# Patient Record
Sex: FEMALE | Race: OTHER | HISPANIC OR LATINO
[De-identification: names, ages, dates, MRNs, and addresses within clinical notes are randomized per-mention and may not be internally consistent; named-entity substitution may affect disease eponyms.]

---

## 2023-06-20 PROBLEM — Z00.00 ENCOUNTER FOR PREVENTIVE HEALTH EXAMINATION: Status: ACTIVE | Noted: 2023-06-20

## 2023-07-02 ENCOUNTER — FORM ENCOUNTER (OUTPATIENT)
Age: 73
End: 2023-07-02

## 2023-07-03 ENCOUNTER — OUTPATIENT (OUTPATIENT)
Dept: OUTPATIENT SERVICES | Facility: HOSPITAL | Age: 73
LOS: 1 days | End: 2023-07-03
Payer: MEDICARE

## 2023-07-03 ENCOUNTER — NON-APPOINTMENT (OUTPATIENT)
Age: 73
End: 2023-07-03

## 2023-07-03 ENCOUNTER — APPOINTMENT (OUTPATIENT)
Dept: CARDIOTHORACIC SURGERY | Facility: CLINIC | Age: 73
End: 2023-07-03
Payer: MEDICARE

## 2023-07-03 VITALS
BODY MASS INDEX: 23.79 KG/M2 | HEIGHT: 61 IN | HEART RATE: 81 BPM | SYSTOLIC BLOOD PRESSURE: 104 MMHG | TEMPERATURE: 97.4 F | DIASTOLIC BLOOD PRESSURE: 71 MMHG | OXYGEN SATURATION: 98 % | RESPIRATION RATE: 17 BRPM | WEIGHT: 126 LBS

## 2023-07-03 DIAGNOSIS — Z86.39 PERSONAL HISTORY OF OTHER ENDOCRINE, NUTRITIONAL AND METABOLIC DISEASE: ICD-10-CM

## 2023-07-03 DIAGNOSIS — I35.0 NONRHEUMATIC AORTIC (VALVE) STENOSIS: ICD-10-CM

## 2023-07-03 DIAGNOSIS — Z87.891 PERSONAL HISTORY OF NICOTINE DEPENDENCE: ICD-10-CM

## 2023-07-03 LAB
ALBUMIN SERPL ELPH-MCNC: 4.2 G/DL — SIGNIFICANT CHANGE UP (ref 3.3–5)
ALP SERPL-CCNC: 58 U/L — SIGNIFICANT CHANGE UP (ref 40–120)
ALT FLD-CCNC: 16 U/L — SIGNIFICANT CHANGE UP (ref 10–45)
ANION GAP SERPL CALC-SCNC: 15 MMOL/L — SIGNIFICANT CHANGE UP (ref 5–17)
ANISOCYTOSIS BLD QL: SIGNIFICANT CHANGE UP
APTT BLD: 38.5 SEC — HIGH (ref 27.5–35.5)
AST SERPL-CCNC: 24 U/L — SIGNIFICANT CHANGE UP (ref 10–40)
BASOPHILS # BLD AUTO: 0.3 K/UL — HIGH (ref 0–0.2)
BASOPHILS NFR BLD AUTO: 2.7 % — HIGH (ref 0–2)
BILIRUB SERPL-MCNC: 0.3 MG/DL — SIGNIFICANT CHANGE UP (ref 0.2–1.2)
BUN SERPL-MCNC: 13 MG/DL — SIGNIFICANT CHANGE UP (ref 7–23)
BURR CELLS BLD QL SMEAR: PRESENT — SIGNIFICANT CHANGE UP
CALCIUM SERPL-MCNC: 9.9 MG/DL — SIGNIFICANT CHANGE UP (ref 8.4–10.5)
CHLORIDE SERPL-SCNC: 104 MMOL/L — SIGNIFICANT CHANGE UP (ref 96–108)
CO2 SERPL-SCNC: 24 MMOL/L — SIGNIFICANT CHANGE UP (ref 22–31)
CREAT SERPL-MCNC: 0.61 MG/DL — SIGNIFICANT CHANGE UP (ref 0.5–1.3)
EGFR: 96 ML/MIN/1.73M2 — SIGNIFICANT CHANGE UP
ELLIPTOCYTES BLD QL SMEAR: SLIGHT — SIGNIFICANT CHANGE UP
EOSINOPHIL # BLD AUTO: 0.1 K/UL — SIGNIFICANT CHANGE UP (ref 0–0.5)
EOSINOPHIL NFR BLD AUTO: 0.9 % — SIGNIFICANT CHANGE UP (ref 0–6)
GIANT PLATELETS BLD QL SMEAR: PRESENT — SIGNIFICANT CHANGE UP
GLUCOSE SERPL-MCNC: 214 MG/DL — HIGH (ref 70–99)
HCT VFR BLD CALC: 41.5 % — SIGNIFICANT CHANGE UP (ref 34.5–45)
HGB BLD-MCNC: 12.2 G/DL — SIGNIFICANT CHANGE UP (ref 11.5–15.5)
HYPOCHROMIA BLD QL: SLIGHT — SIGNIFICANT CHANGE UP
INR BLD: 1.02 — SIGNIFICANT CHANGE UP (ref 0.88–1.16)
LYMPHOCYTES # BLD AUTO: 1.29 K/UL — SIGNIFICANT CHANGE UP (ref 1–3.3)
LYMPHOCYTES # BLD AUTO: 11.5 % — LOW (ref 13–44)
MACROCYTES BLD QL: SLIGHT — SIGNIFICANT CHANGE UP
MANUAL SMEAR VERIFICATION: SIGNIFICANT CHANGE UP
MCHC RBC-ENTMCNC: 24 PG — LOW (ref 27–34)
MCHC RBC-ENTMCNC: 29.4 GM/DL — LOW (ref 32–36)
MCV RBC AUTO: 81.5 FL — SIGNIFICANT CHANGE UP (ref 80–100)
MICROCYTES BLD QL: SLIGHT — SIGNIFICANT CHANGE UP
MONOCYTES # BLD AUTO: 0.39 K/UL — SIGNIFICANT CHANGE UP (ref 0–0.9)
MONOCYTES NFR BLD AUTO: 3.5 % — SIGNIFICANT CHANGE UP (ref 2–14)
MYELOCYTES NFR BLD: 0.9 % — HIGH (ref 0–0)
NEUTROPHILS # BLD AUTO: 9.02 K/UL — HIGH (ref 1.8–7.4)
NEUTROPHILS NFR BLD AUTO: 80.5 % — HIGH (ref 43–77)
NT-PROBNP SERPL-SCNC: 508 PG/ML — HIGH (ref 0–300)
OVALOCYTES BLD QL SMEAR: SLIGHT — SIGNIFICANT CHANGE UP
PLAT MORPH BLD: ABNORMAL
PLATELET # BLD AUTO: 544 K/UL — HIGH (ref 150–400)
POIKILOCYTOSIS BLD QL AUTO: SIGNIFICANT CHANGE UP
POLYCHROMASIA BLD QL SMEAR: SLIGHT — SIGNIFICANT CHANGE UP
POTASSIUM SERPL-MCNC: 5 MMOL/L — SIGNIFICANT CHANGE UP (ref 3.5–5.3)
POTASSIUM SERPL-SCNC: 5 MMOL/L — SIGNIFICANT CHANGE UP (ref 3.5–5.3)
PROT SERPL-MCNC: 6.7 G/DL — SIGNIFICANT CHANGE UP (ref 6–8.3)
PROTHROM AB SERPL-ACNC: 12.1 SEC — SIGNIFICANT CHANGE UP (ref 10.5–13.4)
RBC # BLD: 5.09 M/UL — SIGNIFICANT CHANGE UP (ref 3.8–5.2)
RBC # FLD: SIGNIFICANT CHANGE UP (ref 10.3–14.5)
RBC BLD AUTO: ABNORMAL
SODIUM SERPL-SCNC: 143 MMOL/L — SIGNIFICANT CHANGE UP (ref 135–145)
SPHEROCYTES BLD QL SMEAR: SIGNIFICANT CHANGE UP
WBC # BLD: 11.21 K/UL — HIGH (ref 3.8–10.5)
WBC # FLD AUTO: 11.21 K/UL — HIGH (ref 3.8–10.5)

## 2023-07-03 PROCEDURE — 85730 THROMBOPLASTIN TIME PARTIAL: CPT

## 2023-07-03 PROCEDURE — 93306 TTE W/DOPPLER COMPLETE: CPT

## 2023-07-03 PROCEDURE — 85610 PROTHROMBIN TIME: CPT

## 2023-07-03 PROCEDURE — 85025 COMPLETE CBC W/AUTO DIFF WBC: CPT

## 2023-07-03 PROCEDURE — 83880 ASSAY OF NATRIURETIC PEPTIDE: CPT

## 2023-07-03 PROCEDURE — 80053 COMPREHEN METABOLIC PANEL: CPT

## 2023-07-03 PROCEDURE — 36415 COLL VENOUS BLD VENIPUNCTURE: CPT

## 2023-07-03 PROCEDURE — 86901 BLOOD TYPING SEROLOGIC RH(D): CPT

## 2023-07-03 PROCEDURE — 93306 TTE W/DOPPLER COMPLETE: CPT | Mod: 26

## 2023-07-03 PROCEDURE — 86850 RBC ANTIBODY SCREEN: CPT

## 2023-07-03 PROCEDURE — 86900 BLOOD TYPING SEROLOGIC ABO: CPT

## 2023-07-03 PROCEDURE — 99205 OFFICE O/P NEW HI 60 MIN: CPT

## 2023-07-07 PROBLEM — Z87.891 FORMER SMOKER: Status: ACTIVE | Noted: 2023-07-07

## 2023-07-07 PROBLEM — Z86.39 HISTORY OF DIABETES MELLITUS: Status: RESOLVED | Noted: 2023-07-07 | Resolved: 2023-07-07

## 2023-07-07 NOTE — PHYSICAL EXAM
[Well Developed] : well developed [Well Nourished] : well nourished [No Acute Distress] : no acute distress [Clear Lung Fields] : clear lung fields [Good Air Entry] : good air entry [No Respiratory Distress] : no respiratory distress  [Soft] : abdomen soft [Normal Gait] : normal gait [No Edema] : no edema [No Rash] : no rash [Moves all extremities] : moves all extremities [Alert and Oriented] : alert and oriented [de-identified] : +LORRAINE heard best RUSB

## 2023-07-07 NOTE — REVIEW OF SYSTEMS
[Feeling Fatigued] : feeling fatigued [Negative] : Heme/Lymph [Fever] : no fever [Chills] : no chills [SOB] : no shortness of breath [Dyspnea on exertion] : not dyspnea during exertion [Chest Discomfort] : no chest discomfort [Lower Ext Edema] : no extremity edema [Leg Claudication] : no intermittent leg claudication [Palpitations] : no palpitations [Syncope] : no syncope [Cough] : no cough

## 2023-07-07 NOTE — HISTORY OF PRESENT ILLNESS
[FreeTextEntry1] : 74 y/o Female with PMHx of DMII, recently presented to [facility] 6/12/23 for weakness and cough, found to have anemia 2/2 UGIB and PNA, s/p EGD 6/13/23 friable gastric mucosa s/p 2uPRBCs and PPI ggt, ECHO done revealing severe AS MG 32mmHg. Referred to Dr. Jerry MALAVE. \par \par ECHO 7/3/23: severe AS MG 44mmHg, EF nml\par \par Reports still some weakness and faitgue, but overall feels better since prior admission. Patient denies CP, palpitations, SOB, LE edema, orthopnea, PND, f/c. Pt lives in , has a daughter and son in [place]. Does not use cane or walker.

## 2023-07-14 ENCOUNTER — APPOINTMENT (OUTPATIENT)
Dept: ULTRASOUND IMAGING | Facility: HOSPITAL | Age: 73
End: 2023-07-14

## 2023-07-14 ENCOUNTER — APPOINTMENT (OUTPATIENT)
Dept: CT IMAGING | Facility: HOSPITAL | Age: 73
End: 2023-07-14

## 2023-07-14 ENCOUNTER — OUTPATIENT (OUTPATIENT)
Dept: OUTPATIENT SERVICES | Facility: HOSPITAL | Age: 73
LOS: 1 days | End: 2023-07-14
Payer: MEDICARE

## 2023-07-14 LAB — POCT ISTAT CREATININE: 0.6 MG/DL — SIGNIFICANT CHANGE UP (ref 0.5–1.3)

## 2023-07-14 PROCEDURE — 93880 EXTRACRANIAL BILAT STUDY: CPT

## 2023-07-14 PROCEDURE — 82565 ASSAY OF CREATININE: CPT

## 2023-07-14 PROCEDURE — 74174 CTA ABD&PLVS W/CONTRAST: CPT

## 2023-07-14 PROCEDURE — 93880 EXTRACRANIAL BILAT STUDY: CPT | Mod: 26

## 2023-07-14 PROCEDURE — 74174 CTA ABD&PLVS W/CONTRAST: CPT | Mod: 26

## 2023-07-14 PROCEDURE — 75574 CT ANGIO HRT W/3D IMAGE: CPT | Mod: 26

## 2023-07-14 PROCEDURE — 75574 CT ANGIO HRT W/3D IMAGE: CPT

## 2023-07-31 ENCOUNTER — APPOINTMENT (OUTPATIENT)
Dept: CARDIOTHORACIC SURGERY | Facility: CLINIC | Age: 73
End: 2023-07-31
Payer: MEDICARE

## 2023-07-31 PROCEDURE — 99213 OFFICE O/P EST LOW 20 MIN: CPT

## 2023-11-13 ENCOUNTER — APPOINTMENT (OUTPATIENT)
Dept: CARDIOTHORACIC SURGERY | Facility: CLINIC | Age: 73
End: 2023-11-13
Payer: MEDICARE

## 2023-11-13 VITALS
HEIGHT: 61 IN | BODY MASS INDEX: 24.35 KG/M2 | TEMPERATURE: 97.1 F | RESPIRATION RATE: 17 BRPM | WEIGHT: 129 LBS | OXYGEN SATURATION: 99 % | DIASTOLIC BLOOD PRESSURE: 86 MMHG | SYSTOLIC BLOOD PRESSURE: 126 MMHG | HEART RATE: 84 BPM

## 2023-11-13 PROCEDURE — 99213 OFFICE O/P EST LOW 20 MIN: CPT

## 2024-02-12 ENCOUNTER — APPOINTMENT (OUTPATIENT)
Dept: CARDIOTHORACIC SURGERY | Facility: CLINIC | Age: 74
End: 2024-02-12

## 2024-03-11 ENCOUNTER — RESULT REVIEW (OUTPATIENT)
Age: 74
End: 2024-03-11

## 2024-03-11 ENCOUNTER — APPOINTMENT (OUTPATIENT)
Dept: CARDIOTHORACIC SURGERY | Facility: CLINIC | Age: 74
End: 2024-03-11
Payer: MEDICARE

## 2024-03-11 ENCOUNTER — OUTPATIENT (OUTPATIENT)
Dept: OUTPATIENT SERVICES | Facility: HOSPITAL | Age: 74
LOS: 1 days | End: 2024-03-11
Payer: MEDICARE

## 2024-03-11 DIAGNOSIS — I35.0 NONRHEUMATIC AORTIC (VALVE) STENOSIS: ICD-10-CM

## 2024-03-11 LAB
ALBUMIN SERPL ELPH-MCNC: 4.5 G/DL — SIGNIFICANT CHANGE UP (ref 3.3–5)
ALP SERPL-CCNC: 77 U/L — SIGNIFICANT CHANGE UP (ref 40–120)
ALT FLD-CCNC: 24 U/L — SIGNIFICANT CHANGE UP (ref 10–45)
ANION GAP SERPL CALC-SCNC: 14 MMOL/L — SIGNIFICANT CHANGE UP (ref 5–17)
APTT BLD: 32.5 SEC — SIGNIFICANT CHANGE UP (ref 24.5–35.6)
AST SERPL-CCNC: 31 U/L — SIGNIFICANT CHANGE UP (ref 10–40)
BASOPHILS # BLD AUTO: 0.04 K/UL — SIGNIFICANT CHANGE UP (ref 0–0.2)
BASOPHILS NFR BLD AUTO: 0.5 % — SIGNIFICANT CHANGE UP (ref 0–2)
BILIRUB SERPL-MCNC: 0.5 MG/DL — SIGNIFICANT CHANGE UP (ref 0.2–1.2)
BUN SERPL-MCNC: 10 MG/DL — SIGNIFICANT CHANGE UP (ref 7–23)
CALCIUM SERPL-MCNC: 10.1 MG/DL — SIGNIFICANT CHANGE UP (ref 8.4–10.5)
CHLORIDE SERPL-SCNC: 98 MMOL/L — SIGNIFICANT CHANGE UP (ref 96–108)
CO2 SERPL-SCNC: 24 MMOL/L — SIGNIFICANT CHANGE UP (ref 22–31)
CREAT SERPL-MCNC: 0.63 MG/DL — SIGNIFICANT CHANGE UP (ref 0.5–1.3)
EGFR: 95 ML/MIN/1.73M2 — SIGNIFICANT CHANGE UP
EOSINOPHIL # BLD AUTO: 0.04 K/UL — SIGNIFICANT CHANGE UP (ref 0–0.5)
EOSINOPHIL NFR BLD AUTO: 0.5 % — SIGNIFICANT CHANGE UP (ref 0–6)
GLUCOSE SERPL-MCNC: 374 MG/DL — HIGH (ref 70–99)
HCT VFR BLD CALC: 35.8 % — SIGNIFICANT CHANGE UP (ref 34.5–45)
HGB BLD-MCNC: 11 G/DL — LOW (ref 11.5–15.5)
IMM GRANULOCYTES NFR BLD AUTO: 0.4 % — SIGNIFICANT CHANGE UP (ref 0–0.9)
INR BLD: 1.05 — SIGNIFICANT CHANGE UP (ref 0.85–1.18)
LYMPHOCYTES # BLD AUTO: 0.54 K/UL — LOW (ref 1–3.3)
LYMPHOCYTES # BLD AUTO: 6.6 % — LOW (ref 13–44)
MCHC RBC-ENTMCNC: 25.2 PG — LOW (ref 27–34)
MCHC RBC-ENTMCNC: 30.7 GM/DL — LOW (ref 32–36)
MCV RBC AUTO: 81.9 FL — SIGNIFICANT CHANGE UP (ref 80–100)
MONOCYTES # BLD AUTO: 0.64 K/UL — SIGNIFICANT CHANGE UP (ref 0–0.9)
MONOCYTES NFR BLD AUTO: 7.8 % — SIGNIFICANT CHANGE UP (ref 2–14)
NEUTROPHILS # BLD AUTO: 6.89 K/UL — SIGNIFICANT CHANGE UP (ref 1.8–7.4)
NEUTROPHILS NFR BLD AUTO: 84.2 % — HIGH (ref 43–77)
NRBC # BLD: 0 /100 WBCS — SIGNIFICANT CHANGE UP (ref 0–0)
NT-PROBNP SERPL-SCNC: 2246 PG/ML — HIGH (ref 0–300)
PLATELET # BLD AUTO: 383 K/UL — SIGNIFICANT CHANGE UP (ref 150–400)
POTASSIUM SERPL-MCNC: 4 MMOL/L — SIGNIFICANT CHANGE UP (ref 3.5–5.3)
POTASSIUM SERPL-SCNC: 4 MMOL/L — SIGNIFICANT CHANGE UP (ref 3.5–5.3)
PROT SERPL-MCNC: 7.3 G/DL — SIGNIFICANT CHANGE UP (ref 6–8.3)
PROTHROM AB SERPL-ACNC: 11.9 SEC — SIGNIFICANT CHANGE UP (ref 9.5–13)
RBC # BLD: 4.37 M/UL — SIGNIFICANT CHANGE UP (ref 3.8–5.2)
RBC # FLD: 13.7 % — SIGNIFICANT CHANGE UP (ref 10.3–14.5)
SODIUM SERPL-SCNC: 136 MMOL/L — SIGNIFICANT CHANGE UP (ref 135–145)
WBC # BLD: 8.18 K/UL — SIGNIFICANT CHANGE UP (ref 3.8–10.5)
WBC # FLD AUTO: 8.18 K/UL — SIGNIFICANT CHANGE UP (ref 3.8–10.5)

## 2024-03-11 PROCEDURE — 86900 BLOOD TYPING SEROLOGIC ABO: CPT

## 2024-03-11 PROCEDURE — 83880 ASSAY OF NATRIURETIC PEPTIDE: CPT

## 2024-03-11 PROCEDURE — 85610 PROTHROMBIN TIME: CPT

## 2024-03-11 PROCEDURE — 85025 COMPLETE CBC W/AUTO DIFF WBC: CPT

## 2024-03-11 PROCEDURE — 99214 OFFICE O/P EST MOD 30 MIN: CPT

## 2024-03-11 PROCEDURE — 85730 THROMBOPLASTIN TIME PARTIAL: CPT

## 2024-03-11 PROCEDURE — 36415 COLL VENOUS BLD VENIPUNCTURE: CPT

## 2024-03-11 PROCEDURE — 93306 TTE W/DOPPLER COMPLETE: CPT | Mod: 26

## 2024-03-11 PROCEDURE — 86850 RBC ANTIBODY SCREEN: CPT

## 2024-03-11 PROCEDURE — 93010 ELECTROCARDIOGRAM REPORT: CPT

## 2024-03-11 PROCEDURE — 86901 BLOOD TYPING SEROLOGIC RH(D): CPT

## 2024-03-11 PROCEDURE — C8929: CPT

## 2024-03-11 PROCEDURE — 80053 COMPREHEN METABOLIC PANEL: CPT

## 2024-03-11 PROCEDURE — 93005 ELECTROCARDIOGRAM TRACING: CPT

## 2024-03-12 ENCOUNTER — INPATIENT (INPATIENT)
Facility: HOSPITAL | Age: 74
LOS: 18 days | Discharge: HOME HEALTH | End: 2024-03-31
Attending: STUDENT IN AN ORGANIZED HEALTH CARE EDUCATION/TRAINING PROGRAM | Admitting: STUDENT IN AN ORGANIZED HEALTH CARE EDUCATION/TRAINING PROGRAM
Payer: MEDICARE

## 2024-03-12 VITALS
WEIGHT: 123.24 LBS | SYSTOLIC BLOOD PRESSURE: 118 MMHG | OXYGEN SATURATION: 96 % | HEART RATE: 106 BPM | RESPIRATION RATE: 19 BRPM | DIASTOLIC BLOOD PRESSURE: 58 MMHG

## 2024-03-12 VITALS
DIASTOLIC BLOOD PRESSURE: 58 MMHG | SYSTOLIC BLOOD PRESSURE: 120 MMHG | TEMPERATURE: 97.9 F | RESPIRATION RATE: 18 BRPM | HEART RATE: 91 BPM | OXYGEN SATURATION: 99 %

## 2024-03-12 DIAGNOSIS — I35.0 NONRHEUMATIC AORTIC (VALVE) STENOSIS: ICD-10-CM

## 2024-03-12 DIAGNOSIS — Z87.891 PERSONAL HISTORY OF NICOTINE DEPENDENCE: ICD-10-CM

## 2024-03-12 DIAGNOSIS — Z79.4 LONG TERM (CURRENT) USE OF INSULIN: ICD-10-CM

## 2024-03-12 DIAGNOSIS — R09.02 HYPOXEMIA: ICD-10-CM

## 2024-03-12 DIAGNOSIS — J18.9 PNEUMONIA, UNSPECIFIED ORGANISM: ICD-10-CM

## 2024-03-12 DIAGNOSIS — I95.9 HYPOTENSION, UNSPECIFIED: ICD-10-CM

## 2024-03-12 DIAGNOSIS — I11.0 HYPERTENSIVE HEART DISEASE WITH HEART FAILURE: ICD-10-CM

## 2024-03-12 DIAGNOSIS — R00.1 BRADYCARDIA, UNSPECIFIED: ICD-10-CM

## 2024-03-12 DIAGNOSIS — I25.84 CORONARY ATHEROSCLEROSIS DUE TO CALCIFIED CORONARY LESION: ICD-10-CM

## 2024-03-12 DIAGNOSIS — J40 BRONCHITIS, NOT SPECIFIED AS ACUTE OR CHRONIC: ICD-10-CM
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
## 2024-03-12 DIAGNOSIS — E87.20 ACIDOSIS, UNSPECIFIED: ICD-10-CM

## 2024-03-12 DIAGNOSIS — R57.0 CARDIOGENIC SHOCK: ICD-10-CM

## 2024-03-12 DIAGNOSIS — E78.00 PURE HYPERCHOLESTEROLEMIA, UNSPECIFIED: ICD-10-CM

## 2024-03-12 DIAGNOSIS — Z83.3 FAMILY HISTORY OF DIABETES MELLITUS: ICD-10-CM

## 2024-03-12 DIAGNOSIS — Z79.82 LONG TERM (CURRENT) USE OF ASPIRIN: ICD-10-CM

## 2024-03-12 DIAGNOSIS — Z79.84 LONG TERM (CURRENT) USE OF ORAL HYPOGLYCEMIC DRUGS: ICD-10-CM

## 2024-03-12 DIAGNOSIS — I50.43 ACUTE ON CHRONIC COMBINED SYSTOLIC (CONGESTIVE) AND DIASTOLIC (CONGESTIVE) HEART FAILURE: ICD-10-CM
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
## 2024-03-12 DIAGNOSIS — E11.65 TYPE 2 DIABETES MELLITUS WITH HYPERGLYCEMIA: ICD-10-CM

## 2024-03-12 DIAGNOSIS — Q27.8 OTHER SPECIFIED CONGENITAL MALFORMATIONS OF PERIPHERAL VASCULAR SYSTEM: ICD-10-CM

## 2024-03-12 DIAGNOSIS — I25.10 ATHEROSCLEROTIC HEART DISEASE OF NATIVE CORONARY ARTERY WITHOUT ANGINA PECTORIS: ICD-10-CM

## 2024-03-12 DIAGNOSIS — J95.1 ACUTE PULMONARY INSUFFICIENCY FOLLOWING THORACIC SURGERY: ICD-10-CM

## 2024-03-12 DIAGNOSIS — D62 ACUTE POSTHEMORRHAGIC ANEMIA: ICD-10-CM

## 2024-03-12 DIAGNOSIS — I48.0 PAROXYSMAL ATRIAL FIBRILLATION: ICD-10-CM

## 2024-03-12 DIAGNOSIS — J90 PLEURAL EFFUSION, NOT ELSEWHERE CLASSIFIED: ICD-10-CM

## 2024-03-12 LAB
ADD ON TEST-SPECIMEN IN LAB: SIGNIFICANT CHANGE UP
ALBUMIN SERPL ELPH-MCNC: 3.9 G/DL — SIGNIFICANT CHANGE UP (ref 3.3–5)
ALP SERPL-CCNC: 63 U/L — SIGNIFICANT CHANGE UP (ref 40–120)
ALT FLD-CCNC: 21 U/L — SIGNIFICANT CHANGE UP (ref 10–45)
ANION GAP SERPL CALC-SCNC: 14 MMOL/L — SIGNIFICANT CHANGE UP (ref 5–17)
APTT BLD: 33 SEC — SIGNIFICANT CHANGE UP (ref 24.5–35.6)
AST SERPL-CCNC: 23 U/L — SIGNIFICANT CHANGE UP (ref 10–40)
BILIRUB SERPL-MCNC: 0.4 MG/DL — SIGNIFICANT CHANGE UP (ref 0.2–1.2)
BLD GP AB SCN SERPL QL: NEGATIVE — SIGNIFICANT CHANGE UP
BUN SERPL-MCNC: 12 MG/DL — SIGNIFICANT CHANGE UP (ref 7–23)
CALCIUM SERPL-MCNC: 9.7 MG/DL — SIGNIFICANT CHANGE UP (ref 8.4–10.5)
CHLORIDE SERPL-SCNC: 104 MMOL/L — SIGNIFICANT CHANGE UP (ref 96–108)
CO2 SERPL-SCNC: 23 MMOL/L — SIGNIFICANT CHANGE UP (ref 22–31)
CREAT SERPL-MCNC: 0.67 MG/DL — SIGNIFICANT CHANGE UP (ref 0.5–1.3)
EGFR: 93 ML/MIN/1.73M2 — SIGNIFICANT CHANGE UP
GLUCOSE BLDC GLUCOMTR-MCNC: 215 MG/DL — HIGH (ref 70–99)
GLUCOSE SERPL-MCNC: 194 MG/DL — HIGH (ref 70–99)
HCT VFR BLD CALC: 35.1 % — SIGNIFICANT CHANGE UP (ref 34.5–45)
HGB BLD-MCNC: 11 G/DL — LOW (ref 11.5–15.5)
INR BLD: 1.05 — SIGNIFICANT CHANGE UP (ref 0.85–1.18)
MAGNESIUM SERPL-MCNC: 2 MG/DL — SIGNIFICANT CHANGE UP (ref 1.6–2.6)
MCHC RBC-ENTMCNC: 25.1 PG — LOW (ref 27–34)
MCHC RBC-ENTMCNC: 31.3 GM/DL — LOW (ref 32–36)
MCV RBC AUTO: 80 FL — SIGNIFICANT CHANGE UP (ref 80–100)
NRBC # BLD: 0 /100 WBCS — SIGNIFICANT CHANGE UP (ref 0–0)
NT-PROBNP SERPL-SCNC: 2261 PG/ML — HIGH (ref 0–300)
PLATELET # BLD AUTO: 344 K/UL — SIGNIFICANT CHANGE UP (ref 150–400)
POTASSIUM SERPL-MCNC: 3.6 MMOL/L — SIGNIFICANT CHANGE UP (ref 3.5–5.3)
POTASSIUM SERPL-SCNC: 3.6 MMOL/L — SIGNIFICANT CHANGE UP (ref 3.5–5.3)
PROT SERPL-MCNC: 6.7 G/DL — SIGNIFICANT CHANGE UP (ref 6–8.3)
PROTHROM AB SERPL-ACNC: 12 SEC — SIGNIFICANT CHANGE UP (ref 9.5–13)
RBC # BLD: 4.39 M/UL — SIGNIFICANT CHANGE UP (ref 3.8–5.2)
RBC # FLD: 14.1 % — SIGNIFICANT CHANGE UP (ref 10.3–14.5)
RH IG SCN BLD-IMP: POSITIVE — SIGNIFICANT CHANGE UP
SODIUM SERPL-SCNC: 141 MMOL/L — SIGNIFICANT CHANGE UP (ref 135–145)
TSH SERPL-MCNC: 1.53 UIU/ML — SIGNIFICANT CHANGE UP (ref 0.27–4.2)
WBC # BLD: 6.4 K/UL — SIGNIFICANT CHANGE UP (ref 3.8–10.5)
WBC # FLD AUTO: 6.4 K/UL — SIGNIFICANT CHANGE UP (ref 3.8–10.5)

## 2024-03-12 PROCEDURE — 71045 X-RAY EXAM CHEST 1 VIEW: CPT | Mod: 26

## 2024-03-12 RX ORDER — INSULIN LISPRO 100/ML
VIAL (ML) SUBCUTANEOUS
Refills: 0 | Status: DISCONTINUED | OUTPATIENT
Start: 2024-03-12 | End: 2024-03-17

## 2024-03-12 RX ORDER — DEXTROSE 50 % IN WATER 50 %
12.5 SYRINGE (ML) INTRAVENOUS ONCE
Refills: 0 | Status: DISCONTINUED | OUTPATIENT
Start: 2024-03-12 | End: 2024-03-17

## 2024-03-12 RX ORDER — DEXTROSE 50 % IN WATER 50 %
15 SYRINGE (ML) INTRAVENOUS ONCE
Refills: 0 | Status: DISCONTINUED | OUTPATIENT
Start: 2024-03-12 | End: 2024-03-17

## 2024-03-12 RX ORDER — PANTOPRAZOLE SODIUM 20 MG/1
40 TABLET, DELAYED RELEASE ORAL
Refills: 0 | Status: DISCONTINUED | OUTPATIENT
Start: 2024-03-12 | End: 2024-03-19

## 2024-03-12 RX ORDER — SODIUM CHLORIDE 9 MG/ML
1000 INJECTION, SOLUTION INTRAVENOUS
Refills: 0 | Status: DISCONTINUED | OUTPATIENT
Start: 2024-03-12 | End: 2024-03-17

## 2024-03-12 RX ORDER — TIMOLOL 0.5 %
1 DROPS OPHTHALMIC (EYE)
Refills: 0 | Status: DISCONTINUED | OUTPATIENT
Start: 2024-03-12 | End: 2024-03-19

## 2024-03-12 RX ORDER — LISINOPRIL 2.5 MG/1
10 TABLET ORAL DAILY
Refills: 0 | Status: DISCONTINUED | OUTPATIENT
Start: 2024-03-12 | End: 2024-03-13

## 2024-03-12 RX ORDER — SODIUM CHLORIDE 9 MG/ML
1000 INJECTION, SOLUTION INTRAVENOUS
Refills: 0 | Status: DISCONTINUED | OUTPATIENT
Start: 2024-03-12 | End: 2024-03-16

## 2024-03-12 RX ORDER — AMLODIPINE BESYLATE 2.5 MG/1
5 TABLET ORAL DAILY
Refills: 0 | Status: DISCONTINUED | OUTPATIENT
Start: 2024-03-12 | End: 2024-03-13

## 2024-03-12 RX ORDER — GLUCAGON INJECTION, SOLUTION 0.5 MG/.1ML
1 INJECTION, SOLUTION SUBCUTANEOUS ONCE
Refills: 0 | Status: DISCONTINUED | OUTPATIENT
Start: 2024-03-12 | End: 2024-03-17

## 2024-03-12 RX ORDER — LATANOPROST 0.05 MG/ML
1 SOLUTION/ DROPS OPHTHALMIC; TOPICAL AT BEDTIME
Refills: 0 | Status: DISCONTINUED | OUTPATIENT
Start: 2024-03-12 | End: 2024-03-19

## 2024-03-12 RX ORDER — DEXTROSE 50 % IN WATER 50 %
25 SYRINGE (ML) INTRAVENOUS ONCE
Refills: 0 | Status: DISCONTINUED | OUTPATIENT
Start: 2024-03-12 | End: 2024-03-17

## 2024-03-12 RX ORDER — SODIUM CHLORIDE 9 MG/ML
3 INJECTION INTRAMUSCULAR; INTRAVENOUS; SUBCUTANEOUS EVERY 8 HOURS
Refills: 0 | Status: DISCONTINUED | OUTPATIENT
Start: 2024-03-12 | End: 2024-03-19

## 2024-03-12 RX ORDER — ATORVASTATIN CALCIUM 80 MG/1
40 TABLET, FILM COATED ORAL AT BEDTIME
Refills: 0 | Status: DISCONTINUED | OUTPATIENT
Start: 2024-03-12 | End: 2024-03-19

## 2024-03-12 RX ADMIN — SODIUM CHLORIDE 3 MILLILITER(S): 9 INJECTION INTRAMUSCULAR; INTRAVENOUS; SUBCUTANEOUS at 22:19

## 2024-03-12 RX ADMIN — SODIUM CHLORIDE 50 MILLILITER(S): 9 INJECTION, SOLUTION INTRAVENOUS at 22:26

## 2024-03-12 RX ADMIN — ATORVASTATIN CALCIUM 40 MILLIGRAM(S): 80 TABLET, FILM COATED ORAL at 22:25

## 2024-03-12 NOTE — PATIENT PROFILE ADULT - FALL HARM RISK - RISK INTERVENTIONS

## 2024-03-13 DIAGNOSIS — E11.9 TYPE 2 DIABETES MELLITUS WITHOUT COMPLICATIONS: ICD-10-CM

## 2024-03-13 DIAGNOSIS — I35.0 NONRHEUMATIC AORTIC (VALVE) STENOSIS: ICD-10-CM

## 2024-03-13 DIAGNOSIS — I10 ESSENTIAL (PRIMARY) HYPERTENSION: ICD-10-CM

## 2024-03-13 DIAGNOSIS — E78.5 HYPERLIPIDEMIA, UNSPECIFIED: ICD-10-CM

## 2024-03-13 LAB
A1C WITH ESTIMATED AVERAGE GLUCOSE RESULT: 9.6 % — HIGH (ref 4–5.6)
ESTIMATED AVERAGE GLUCOSE: 229 MG/DL — HIGH (ref 68–114)
GLUCOSE BLDC GLUCOMTR-MCNC: 202 MG/DL — HIGH (ref 70–99)
GLUCOSE BLDC GLUCOMTR-MCNC: 206 MG/DL — HIGH (ref 70–99)
GLUCOSE BLDC GLUCOMTR-MCNC: 285 MG/DL — HIGH (ref 70–99)
GLUCOSE BLDC GLUCOMTR-MCNC: 303 MG/DL — HIGH (ref 70–99)

## 2024-03-13 PROCEDURE — 99223 1ST HOSP IP/OBS HIGH 75: CPT

## 2024-03-13 PROCEDURE — 93880 EXTRACRANIAL BILAT STUDY: CPT | Mod: 26

## 2024-03-13 PROCEDURE — 93010 ELECTROCARDIOGRAM REPORT: CPT

## 2024-03-13 PROCEDURE — 99221 1ST HOSP IP/OBS SF/LOW 40: CPT

## 2024-03-13 RX ORDER — SENNA PLUS 8.6 MG/1
2 TABLET ORAL AT BEDTIME
Refills: 0 | Status: DISCONTINUED | OUTPATIENT
Start: 2024-03-13 | End: 2024-03-19

## 2024-03-13 RX ORDER — INSULIN LISPRO 100/ML
4 VIAL (ML) SUBCUTANEOUS
Refills: 0 | Status: DISCONTINUED | OUTPATIENT
Start: 2024-03-13 | End: 2024-03-15

## 2024-03-13 RX ORDER — INSULIN LISPRO 100/ML
VIAL (ML) SUBCUTANEOUS AT BEDTIME
Refills: 0 | Status: DISCONTINUED | OUTPATIENT
Start: 2024-03-13 | End: 2024-03-17

## 2024-03-13 RX ORDER — METOPROLOL TARTRATE 50 MG
25 TABLET ORAL EVERY 12 HOURS
Refills: 0 | Status: DISCONTINUED | OUTPATIENT
Start: 2024-03-13 | End: 2024-03-19

## 2024-03-13 RX ORDER — DILTIAZEM HCL 120 MG
5 CAPSULE, EXT RELEASE 24 HR ORAL ONCE
Refills: 0 | Status: COMPLETED | OUTPATIENT
Start: 2024-03-13 | End: 2024-03-13

## 2024-03-13 RX ORDER — INSULIN GLARGINE 100 [IU]/ML
14 INJECTION, SOLUTION SUBCUTANEOUS AT BEDTIME
Refills: 0 | Status: DISCONTINUED | OUTPATIENT
Start: 2024-03-13 | End: 2024-03-15

## 2024-03-13 RX ORDER — AMIODARONE HYDROCHLORIDE 400 MG/1
150 TABLET ORAL ONCE
Refills: 0 | Status: COMPLETED | OUTPATIENT
Start: 2024-03-13 | End: 2024-03-13

## 2024-03-13 RX ORDER — ASPIRIN/CALCIUM CARB/MAGNESIUM 324 MG
81 TABLET ORAL DAILY
Refills: 0 | Status: DISCONTINUED | OUTPATIENT
Start: 2024-03-13 | End: 2024-03-19

## 2024-03-13 RX ORDER — ACETAMINOPHEN 500 MG
650 TABLET ORAL EVERY 6 HOURS
Refills: 0 | Status: DISCONTINUED | OUTPATIENT
Start: 2024-03-13 | End: 2024-03-19

## 2024-03-13 RX ORDER — POLYETHYLENE GLYCOL 3350 17 G/17G
17 POWDER, FOR SOLUTION ORAL DAILY
Refills: 0 | Status: DISCONTINUED | OUTPATIENT
Start: 2024-03-13 | End: 2024-03-19

## 2024-03-13 RX ORDER — TRAVOPROST 0.04 MG/ML
1 SOLUTION/ DROPS OPHTHALMIC
Refills: 0 | DISCHARGE

## 2024-03-13 RX ORDER — TIMOLOL 0.5 %
1 DROPS OPHTHALMIC (EYE)
Refills: 0 | DISCHARGE

## 2024-03-13 RX ORDER — ACETAMINOPHEN 500 MG
650 TABLET ORAL EVERY 6 HOURS
Refills: 0 | Status: DISCONTINUED | OUTPATIENT
Start: 2024-03-13 | End: 2024-03-13

## 2024-03-13 RX ADMIN — PANTOPRAZOLE SODIUM 40 MILLIGRAM(S): 20 TABLET, DELAYED RELEASE ORAL at 05:24

## 2024-03-13 RX ADMIN — Medication 650 MILLIGRAM(S): at 06:00

## 2024-03-13 RX ADMIN — LATANOPROST 1 DROP(S): 0.05 SOLUTION/ DROPS OPHTHALMIC; TOPICAL at 22:07

## 2024-03-13 RX ADMIN — Medication 25 MILLIGRAM(S): at 17:43

## 2024-03-13 RX ADMIN — AMIODARONE HYDROCHLORIDE 600 MILLIGRAM(S): 400 TABLET ORAL at 17:14

## 2024-03-13 RX ADMIN — Medication 5 MILLIGRAM(S): at 17:25

## 2024-03-13 RX ADMIN — Medication 4: at 17:30

## 2024-03-13 RX ADMIN — Medication 4: at 12:19

## 2024-03-13 RX ADMIN — Medication 650 MILLIGRAM(S): at 05:30

## 2024-03-13 RX ADMIN — SODIUM CHLORIDE 3 MILLILITER(S): 9 INJECTION INTRAMUSCULAR; INTRAVENOUS; SUBCUTANEOUS at 21:03

## 2024-03-13 RX ADMIN — AMIODARONE HYDROCHLORIDE 133.33 MILLIGRAM(S): 400 TABLET ORAL at 17:45

## 2024-03-13 RX ADMIN — Medication 25 MILLIGRAM(S): at 05:25

## 2024-03-13 RX ADMIN — Medication 4 UNIT(S): at 17:31

## 2024-03-13 RX ADMIN — Medication 6: at 07:38

## 2024-03-13 RX ADMIN — Medication 4: at 22:10

## 2024-03-13 RX ADMIN — INSULIN GLARGINE 14 UNIT(S): 100 INJECTION, SOLUTION SUBCUTANEOUS at 22:07

## 2024-03-13 RX ADMIN — LISINOPRIL 10 MILLIGRAM(S): 2.5 TABLET ORAL at 05:25

## 2024-03-13 RX ADMIN — SODIUM CHLORIDE 3 MILLILITER(S): 9 INJECTION INTRAMUSCULAR; INTRAVENOUS; SUBCUTANEOUS at 05:00

## 2024-03-13 RX ADMIN — Medication 5 MILLIGRAM(S): at 17:20

## 2024-03-13 RX ADMIN — SODIUM CHLORIDE 3 MILLILITER(S): 9 INJECTION INTRAMUSCULAR; INTRAVENOUS; SUBCUTANEOUS at 14:30

## 2024-03-13 RX ADMIN — AMLODIPINE BESYLATE 5 MILLIGRAM(S): 2.5 TABLET ORAL at 05:25

## 2024-03-13 RX ADMIN — ATORVASTATIN CALCIUM 40 MILLIGRAM(S): 80 TABLET, FILM COATED ORAL at 22:07

## 2024-03-13 NOTE — H&P ADULT - ASSESSMENT
74 y/o female PMH DM, severe AS, EF 60%, HTN, HLD, anemia who was admitted to [facility] with syncope. CT negative for CVA. subsequent echo showed severe AS. Transferred to [facility] under Dr. Moon for evaluation for TAVR vs BAV.

## 2024-03-13 NOTE — H&P ADULT - NSICDXPASTMEDICALHX_GEN_ALL_CORE_FT
PAST MEDICAL HISTORY:  Anemia     Aortic stenosis     DM (diabetes mellitus)     HTN (hypertension)     Hyperlipidemia

## 2024-03-13 NOTE — PROGRESS NOTE ADULT - SUBJECTIVE AND OBJECTIVE BOX
Patient discussed on morning rounds with Dr. Doyle      Pre-op for SAVR    SUBJECTIVE ASSESSMENT:  73y Female complaining of being hungry, no other active issues. Denies any fevers, chills, headache, lightheadedness, dizziness, chest pain, shortness of breath, abdominal pain, nausea, vomiting, changes in bowel or bladder, paresthesias, or any other acute complaint.        Vital Signs Last 24 Hrs  T(C): 36.3 (13 Mar 2024 13:56), Max: 36.9 (12 Mar 2024 21:20)  T(F): 97.4 (13 Mar 2024 13:56), Max: 98.5 (12 Mar 2024 21:20)  HR: 86 (13 Mar 2024 14:56) (76 - 106)  BP: 95/53 (13 Mar 2024 14:56) (95/53 - 144/65)  BP(mean): 70 (13 Mar 2024 14:56) (68 - 94)  RR: 12 (13 Mar 2024 14:56) (12 - 19)  SpO2: 95% (13 Mar 2024 14:56) (94% - 97%)    Parameters below as of 13 Mar 2024 14:56  Patient On (Oxygen Delivery Method): room air      I&O's Detail    12 Mar 2024 07:01  -  13 Mar 2024 07:00  --------------------------------------------------------  IN:    Lactated Ringers: 450 mL  Total IN: 450 mL    OUT:    Voided (mL): 150 mL  Total OUT: 150 mL    Total NET: 300 mL      13 Mar 2024 07:01  -  13 Mar 2024 15:06  --------------------------------------------------------  IN:    Lactated Ringers: 50 mL    Oral Fluid: 240 mL  Total IN: 290 mL    OUT:  Total OUT: 0 mL    Total NET: 290 mL          CHEST TUBE: NO   MOHSEN DRAIN: No.  EPICARDIAL WIRES: No.  TIE DOWNS: No.  TRENT: No.    PHYSICAL EXAM:    General: Sitting in bed comfortably in NAD  Neuro: A&Ox3, no focal deficits   HEENT: NCAT, EOMI   Cardiac: Regular rate and rhythm, normal S1 and S2. + murmur    Pulm: Breathing comfortably on RA. No signs of respiratory distress. Lungs are CTA b/l without wheezes, rales, or rhonchi   Abdomen: Soft, non-distended, non-tender. + bowel sounds   : No trent  Extremities: Warm and well perfused, no peripheral edema, distal pulses 2+. No calf tenderness. SCDs and TEDs in place  MSK: Full AROM       LABS:                        11.0   6.40  )-----------( 344      ( 12 Mar 2024 22:12 )             35.1     PT/INR - ( 12 Mar 2024 22:12 )   PT: 12.0 sec;   INR: 1.05          PTT - ( 12 Mar 2024 22:12 )  PTT:33.0 sec    03-12    141  |  104  |  12  ----------------------------<  194<H>  3.6   |  23  |  0.67    Ca    9.7      12 Mar 2024 22:12  Mg     2.0     03-12    TPro  6.7  /  Alb  3.9  /  TBili  0.4  /  DBili  x   /  AST  23  /  ALT  21  /  AlkPhos  63  03-12      Urinalysis Basic - ( 12 Mar 2024 22:12 )    Color: x / Appearance: x / SG: x / pH: x  Gluc: 194 mg/dL / Ketone: x  / Bili: x / Urobili: x   Blood: x / Protein: x / Nitrite: x   Leuk Esterase: x / RBC: x / WBC x   Sq Epi: x / Non Sq Epi: x / Bacteria: x        MEDICATIONS  (STANDING):  atorvastatin 40 milliGRAM(s) Oral at bedtime  dextrose 5%. 1000 milliLiter(s) (100 mL/Hr) IV Continuous <Continuous>  dextrose 5%. 1000 milliLiter(s) (50 mL/Hr) IV Continuous <Continuous>  dextrose 50% Injectable 25 Gram(s) IV Push once  dextrose 50% Injectable 25 Gram(s) IV Push once  dextrose 50% Injectable 12.5 Gram(s) IV Push once  glucagon  Injectable 1 milliGRAM(s) IntraMuscular once  insulin glargine Injectable (LANTUS) 14 Unit(s) SubCutaneous at bedtime  insulin lispro (ADMELOG) corrective regimen sliding scale   SubCutaneous three times a day before meals  insulin lispro Injectable (ADMELOG) 4 Unit(s) SubCutaneous three times a day before meals  lactated ringers. 1000 milliLiter(s) (50 mL/Hr) IV Continuous <Continuous>  latanoprost 0.005% Ophthalmic Solution 1 Drop(s) Both EYES at bedtime  metoprolol tartrate 25 milliGRAM(s) Oral every 12 hours  pantoprazole    Tablet 40 milliGRAM(s) Oral before breakfast  sodium chloride 0.9% lock flush 3 milliLiter(s) IV Push every 8 hours  timolol 0.5% Solution 1 Drop(s) Both EYES two times a day    MEDICATIONS  (PRN):  acetaminophen     Tablet .. 650 milliGRAM(s) Oral every 6 hours PRN Mild Pain (1 - 3)  dextrose Oral Gel 15 Gram(s) Oral once PRN Blood Glucose LESS THAN 70 milliGRAM(s)/deciliter        RADIOLOGY & ADDITIONAL TESTS:     Patient discussed on morning rounds with Dr. Doyle      Pre-op for SAVR    SUBJECTIVE ASSESSMENT:  73y Female complaining of being hungry, no other active issues. Denies any fevers, chills, headache, lightheadedness, dizziness, chest pain, shortness of breath, abdominal pain, nausea, vomiting, changes in bowel or bladder, paresthesias, or any other acute complaint.      Vital Signs Last 24 Hrs  T(C): 36.3 (13 Mar 2024 13:56), Max: 36.9 (12 Mar 2024 21:20)  T(F): 97.4 (13 Mar 2024 13:56), Max: 98.5 (12 Mar 2024 21:20)  HR: 86 (13 Mar 2024 14:56) (76 - 106)  BP: 95/53 (13 Mar 2024 14:56) (95/53 - 144/65)  BP(mean): 70 (13 Mar 2024 14:56) (68 - 94)  RR: 12 (13 Mar 2024 14:56) (12 - 19)  SpO2: 95% (13 Mar 2024 14:56) (94% - 97%)    Parameters below as of 13 Mar 2024 14:56  Patient On (Oxygen Delivery Method): room air      I&O's Detail    12 Mar 2024 07:01  -  13 Mar 2024 07:00  --------------------------------------------------------  IN:    Lactated Ringers: 450 mL  Total IN: 450 mL    OUT:    Voided (mL): 150 mL  Total OUT: 150 mL    Total NET: 300 mL      13 Mar 2024 07:01  -  13 Mar 2024 15:06  --------------------------------------------------------  IN:    Lactated Ringers: 50 mL    Oral Fluid: 240 mL  Total IN: 290 mL    OUT:  Total OUT: 0 mL    Total NET: 290 mL          CHEST TUBE: NO   MOHSEN DRAIN: No.  EPICARDIAL WIRES: No.  TIE DOWNS: No.  TRENT: No.    PHYSICAL EXAM:    General: Sitting in bed comfortably in NAD  Neuro: A&Ox3, no focal deficits   HEENT: NCAT, EOMI   Cardiac: Regular rate and rhythm, normal S1 and S2. + murmur    Pulm: Breathing comfortably on RA. No signs of respiratory distress. Lungs are CTA b/l without wheezes, rales, or rhonchi   Abdomen: Soft, non-distended, non-tender. + bowel sounds   : No trent  Extremities: Warm and well perfused, no peripheral edema, distal pulses 2+. No calf tenderness. SCDs and TEDs in place  MSK: Full AROM       LABS:                        11.0   6.40  )-----------( 344      ( 12 Mar 2024 22:12 )             35.1     PT/INR - ( 12 Mar 2024 22:12 )   PT: 12.0 sec;   INR: 1.05          PTT - ( 12 Mar 2024 22:12 )  PTT:33.0 sec    03-12    141  |  104  |  12  ----------------------------<  194<H>  3.6   |  23  |  0.67    Ca    9.7      12 Mar 2024 22:12  Mg     2.0     03-12    TPro  6.7  /  Alb  3.9  /  TBili  0.4  /  DBili  x   /  AST  23  /  ALT  21  /  AlkPhos  63  03-12      Urinalysis Basic - ( 12 Mar 2024 22:12 )    Color: x / Appearance: x / SG: x / pH: x  Gluc: 194 mg/dL / Ketone: x  / Bili: x / Urobili: x   Blood: x / Protein: x / Nitrite: x   Leuk Esterase: x / RBC: x / WBC x   Sq Epi: x / Non Sq Epi: x / Bacteria: x        MEDICATIONS  (STANDING):  atorvastatin 40 milliGRAM(s) Oral at bedtime  dextrose 5%. 1000 milliLiter(s) (100 mL/Hr) IV Continuous <Continuous>  dextrose 5%. 1000 milliLiter(s) (50 mL/Hr) IV Continuous <Continuous>  dextrose 50% Injectable 25 Gram(s) IV Push once  dextrose 50% Injectable 25 Gram(s) IV Push once  dextrose 50% Injectable 12.5 Gram(s) IV Push once  glucagon  Injectable 1 milliGRAM(s) IntraMuscular once  insulin glargine Injectable (LANTUS) 14 Unit(s) SubCutaneous at bedtime  insulin lispro (ADMELOG) corrective regimen sliding scale   SubCutaneous three times a day before meals  insulin lispro Injectable (ADMELOG) 4 Unit(s) SubCutaneous three times a day before meals  lactated ringers. 1000 milliLiter(s) (50 mL/Hr) IV Continuous <Continuous>  latanoprost 0.005% Ophthalmic Solution 1 Drop(s) Both EYES at bedtime  metoprolol tartrate 25 milliGRAM(s) Oral every 12 hours  pantoprazole    Tablet 40 milliGRAM(s) Oral before breakfast  sodium chloride 0.9% lock flush 3 milliLiter(s) IV Push every 8 hours  timolol 0.5% Solution 1 Drop(s) Both EYES two times a day    MEDICATIONS  (PRN):  acetaminophen     Tablet .. 650 milliGRAM(s) Oral every 6 hours PRN Mild Pain (1 - 3)  dextrose Oral Gel 15 Gram(s) Oral once PRN Blood Glucose LESS THAN 70 milliGRAM(s)/deciliter        RADIOLOGY & ADDITIONAL TESTS:

## 2024-03-13 NOTE — H&P ADULT - HISTORY OF PRESENT ILLNESS
72 y/o female PMH DM, severe AS, EF 60%, HTN, HLD, anemia who was admitted to [facility] with syncope. CT negative for CVA. subsequent echo showed severe AS. Transferred to [facility] under Dr. Moon for evaluation for TAVR vs BAV.

## 2024-03-13 NOTE — CONSULT NOTE ADULT - PROBLEM SELECTOR RECOMMENDATION 9
# Type 2 diabetes mellitus with hyperglycemia  - Please continue lantus *** units at bedtime.   - Continue lispro *** units before each meal.  - Continue lispro moderate dose sliding scale before meals and at bedtime.  - Patient's fingerstick glucose goal is 100-180 mg/dL.    - For discharge, patient can ***.    - Patient can follow up at discharge with [facility] by calling [phone] to make an appointment.      Case discussed with Dr. whitman/ Primary team updated. # Type 2 diabetes mellitus with hyperglycemia  - Please give lantus 14 units at bedtime.   - Give lispro 4 units before each meal.  - Continue lispro moderate dose sliding scale before meals and at bedtime.  - Patient's fingerstick glucose goal is 100-180 mg/dL.    - For discharge, patient can ***.    - Patient can follow up at discharge with [facility] by calling [phone] to make an appointment.      Case discussed with Dr. whitman/ Primary team updated.

## 2024-03-13 NOTE — H&P ADULT - NSHPPHYSICALEXAM_GEN_ALL_CORE
Physical Exam  CONSTITUTIONAL:                                                              WNL  NEURO:                                                                       WNL                      EYES:                                                                                WNL  ENMT:                                                                               WNL  CV:                                                                                   systolic murmur  RESPIRATORY:                                                                 WNL  GI:                                                                                     WNL  : TEJEDA + / -                                                                  WNL  MUSKULOSKELETAL:                                                       WNL  SKIN / BREAST:                                                                  WNL

## 2024-03-13 NOTE — CONSULT NOTE ADULT - SUBJECTIVE AND OBJECTIVE BOX
HISTORY OF PRESENT ILLNESS  74 y/o female PMH DM, severe AS, EF 60%, HTN, HLD, anemia who was admitted to [facility] with syncope. CT negative for CVA. subsequent echo showed severe AS. Transferred to [facility] under Dr. Moon for evaluation for TAVR vs BAV.    CAPILLARY BLOOD GLUCOSE & INSULIN RECEIVED  215 mg/dL (03-12 @ 21:41)  194 mg/dL (03-12 @ 22:12)  285 mg/dL (03-13 @ 07:19)  206 mg/dL (03-13 @ 11:48)      DIABETES HISTORY  - Age at diagnosis:   - Symptoms at time of diagnosis:   - Current Therapy: · 	glipiZIDE 10 mg oral tablet, extended release: Last Dose Taken:  , 1 tab(s) orally once a day  · 	Janumet 50 mg-1000 mg oral tablet: Last Dose Taken:  , 1 tab(s) orally 2 times a day  - History of other regimens:   - History of hypoglycemia:   - History of DKA/HHS:   - Complications:   - Home FSG:        > Fasting: *** mg/dL.        > Before meals: *** mg/dL.        > Bedtime: *** mg/dL.  - Diet:          > Breakfast:         > Lunch:        > Dinner:        > Snacks:  - Physical activity:    - Outpatient follow-up:     PAST MEDICAL & SURGICAL HISTORY  As per history of present illness.     FAMILY HISTORY  - Diabetes:  - Thyroid:  - Autoimmune:  - Other:    SOCIAL HISTORY  no smoker  social ETOH      ALLERGIES  No Known Allergies    CURRENT MEDICATIONS  acetaminophen     Tablet .. 650 milliGRAM(s) Oral every 6 hours PRN  atorvastatin 40 milliGRAM(s) Oral at bedtime  dextrose 5%. 1000 milliLiter(s) IV Continuous <Continuous>  dextrose 5%. 1000 milliLiter(s) IV Continuous <Continuous>  dextrose 50% Injectable 25 Gram(s) IV Push once  dextrose 50% Injectable 12.5 Gram(s) IV Push once  dextrose 50% Injectable 25 Gram(s) IV Push once  dextrose Oral Gel 15 Gram(s) Oral once PRN  glucagon  Injectable 1 milliGRAM(s) IntraMuscular once  insulin lispro (ADMELOG) corrective regimen sliding scale   SubCutaneous three times a day before meals  lactated ringers. 1000 milliLiter(s) IV Continuous <Continuous>  latanoprost 0.005% Ophthalmic Solution 1 Drop(s) Both EYES at bedtime  metoprolol tartrate 25 milliGRAM(s) Oral every 12 hours  pantoprazole    Tablet 40 milliGRAM(s) Oral before breakfast  sodium chloride 0.9% lock flush 3 milliLiter(s) IV Push every 8 hours  timolol 0.5% Solution 1 Drop(s) Both EYES two times a day    REVIEW OF SYSTEMS  Constitutional:  Negative fever, chills or loss of appetite.  Eyes:  Negative blurry vision or double vision.  Cardiovascular:  Negative for chest pain or palpitations.  Respiratory:  Negative for cough, wheezing, or shortness of breath.   Gastrointestinal:  Negative for nausea, vomiting, diarrhea, constipation, or abdominal pain.  Genitourinary:  Negative frequency, urgency or dysuria.  Neurologic:  No headache, confusion, dizziness, lightheadedness.    PHYSICAL EXAM  Vital Signs Last 24 Hrs  T(C): 36.4 (13 Mar 2024 09:12), Max: 36.9 (12 Mar 2024 21:20)  T(F): 97.6 (13 Mar 2024 09:12), Max: 98.5 (12 Mar 2024 21:20)  HR: 76 (13 Mar 2024 09:13) (76 - 106)  BP: 99/47 (13 Mar 2024 09:13) (99/47 - 144/65)  BP(mean): 68 (13 Mar 2024 09:13) (68 - 94)  RR: 12 (13 Mar 2024 09:13) (12 - 19)  SpO2: 95% (13 Mar 2024 09:13) (94% - 96%)    Parameters below as of 13 Mar 2024 09:13  Patient On (Oxygen Delivery Method): room air    Constitutional: Awake, alert, in no acute distress.   HEENT: Normocephalic, atraumatic, GEORGETTE, no proptosis or lid retraction.   Neck: supple, no acanthosis, no thyromegaly or palpable thyroid nodules.  Respiratory: Lungs clear to ausculation bilaterally.   Cardiovascular: regular rhythm, normal S1 and S2, no audible murmurs.   GI: soft, non-tender, non-distended, bowel sounds present, no masses appreciated.  Extremities: No lower extremity edema, peripheral pulses present.   Skin: no rashes.   Psychiatric: AAO x 3. Normal affect/mood.     LABS  CBC - WBC/HGB/HTC/PLT: 6.40/11.0/35.1/344 (03-12-24)  BMP: Na/K/Cl/Bicarb/BUN/Cr/Gluc: 141/3.6/104/23/12/0.67/194 (03-12-24)  Anion Gap: 14 (03-12-24)  eGFR: 93 (03-12-24)  Calcium: 9.7 (03-12-24)  Phosphorus: -- (03-12-24)  Magnesium: 2.0 (03-12-24)  LFT - Alb/Tprot/Tbili/Dbili/AlkPhos/ALT/AST: 3.9/--/0.4/--/63/21/23 (03-12-24)  PT/aPTT/INR: 12.0/33.0/1.05 (03-12-24)  Thyroid Stimulating Hormone, Serum: 1.530 (03-12-24)      ASSESSMENT / RECOMMENDATIONS      EF:         HISTORY OF PRESENT ILLNESS  74 y/o female PMH T2DM, severe AS, EF 60%, HTN, HLD, anemia who was admitted to [facility] with syncope. CT negative for CVA. Subsequent echo showed severe AS. Transferred to [facility] evaluation for TAVR vs BAV, deemed not a candidate and now planned for SAVR.    Endocrine consulted for uncontrolled type 2 DM. Pt seen and examined at bedside. No new complaints. Picking at lunch, which is first meal since she's been here.     CAPILLARY BLOOD GLUCOSE & INSULIN RECEIVED  215 mg/dL ( @ 21:41)  194 mg/dL ( @ 22:12)  285 mg/dL ( @ 07:19) - Lispro 6. No breakfast.  206 mg/dL ( @ 11:48) - Lispro 4. Picking at sandwich.    DIABETES HISTORY  - Age at diagnosis: approx 61y/o  - Current Therapy: · 	  glipiZIDE 10 mg oral tablet, extended release: Last Dose Taken:  , 1 tab(s) orally once a day  Janumet 50 mg-1000 mg oral tablet: Last Dose Taken:  , 1 tab(s) orally 2 times a day  - History of other regimens: None  - History of hypoglycemia: None  - History of DKA/HHS: None  - Complications: Eye exam UTD- no retinopathy denies neuropathic symptoms  - Home FSG: twice daily        > Fastin.5- 7 mmol? ~ 100-130 mg/dl        > Before dinner: 110s mg/dL.  - Diet:          > Breakfast: Oatmeal        > Lunch: fish, veg, potato (red or sweet)        > Dinner: LEftovers.        > Snacks: 1/2 apple turnover and unsweetened almond milk.  - Outpatient follow-up: PCP    PAST MEDICAL & SURGICAL HISTORY  As per history of present illness.     FAMILY HISTORY  - Diabetes: father's side  - Thyroid: denies  - Autoimmune: denies    SOCIAL HISTORY  lives alone with her turtle  no smoker  social ETOH      ALLERGIES  No Known Allergies    CURRENT MEDICATIONS  acetaminophen     Tablet .. 650 milliGRAM(s) Oral every 6 hours PRN  atorvastatin 40 milliGRAM(s) Oral at bedtime  dextrose 5%. 1000 milliLiter(s) IV Continuous <Continuous>  dextrose 5%. 1000 milliLiter(s) IV Continuous <Continuous>  dextrose 50% Injectable 25 Gram(s) IV Push once  dextrose 50% Injectable 12.5 Gram(s) IV Push once  dextrose 50% Injectable 25 Gram(s) IV Push once  dextrose Oral Gel 15 Gram(s) Oral once PRN  glucagon  Injectable 1 milliGRAM(s) IntraMuscular once  insulin lispro (ADMELOG) corrective regimen sliding scale   SubCutaneous three times a day before meals  lactated ringers. 1000 milliLiter(s) IV Continuous <Continuous>  latanoprost 0.005% Ophthalmic Solution 1 Drop(s) Both EYES at bedtime  metoprolol tartrate 25 milliGRAM(s) Oral every 12 hours  pantoprazole    Tablet 40 milliGRAM(s) Oral before breakfast  sodium chloride 0.9% lock flush 3 milliLiter(s) IV Push every 8 hours  timolol 0.5% Solution 1 Drop(s) Both EYES two times a day    REVIEW OF SYSTEMS  Constitutional:  Negative fever, chills or loss of appetite.  Eyes:  Negative blurry vision or double vision.  Cardiovascular:  Negative for chest pain or palpitations.  Respiratory:  Negative for cough, wheezing, or shortness of breath.   Gastrointestinal:  Negative for nausea, vomiting, diarrhea, constipation, or abdominal pain.  Genitourinary:  Negative frequency, urgency or dysuria.  Neurologic:  No headache, confusion, dizziness, lightheadedness.    PHYSICAL EXAM  Vital Signs Last 24 Hrs  T(C): 36.4 (13 Mar 2024 09:12), Max: 36.9 (12 Mar 2024 21:20)  T(F): 97.6 (13 Mar 2024 09:12), Max: 98.5 (12 Mar 2024 21:20)  HR: 76 (13 Mar 2024 09:13) (76 - 106)  BP: 99/47 (13 Mar 2024 09:13) (99/47 - 144/65)  BP(mean): 68 (13 Mar 2024 09:13) (68 - 94)  RR: 12 (13 Mar 2024 09:13) (12 - 19)  SpO2: 95% (13 Mar 2024 09:13) (94% - 96%)    Parameters below as of 13 Mar 2024 09:13  Patient On (Oxygen Delivery Method): room air    Constitutional: Awake, alert, in no acute distress.   HEENT: Normocephalic, atraumatic, GEORGETTE, no proptosis or lid retraction.   Neck: supple, no acanthosis, no thyromegaly or palpable thyroid nodules.  Respiratory: Lungs clear to ausculation bilaterally.   Cardiovascular: regular rhythm, normal S1 and S2, + murmur  GI: soft, non-tender, non-distended, bowel sounds present, no masses appreciated.  Extremities: No lower extremity edema, peripheral pulses present.   Skin: no rashes.   Psychiatric: AAO x 3. Normal affect/mood.     LABS  CBC - WBC/HGB/HTC/PLT: 6.40/11.0/35.1/344 (24)  BMP: Na/K/Cl/Bicarb/BUN/Cr/Gluc: 141/3.6/104/23/12/0.67/194 (24)  Anion Gap: 14 (24)  eGFR: 93 (24)  Calcium: 9.7 (24)  Phosphorus: -- (24)  Magnesium: 2.0 (24)  LFT - Alb/Tprot/Tbili/Dbili/AlkPhos/ALT/AST: 3.9/--/0.4/--/63/21/ (24)  PT/aPTT/INR: 12.0/33.0/1.05 (24)  Thyroid Stimulating Hormone, Serum: 1.530 (24)

## 2024-03-13 NOTE — PROGRESS NOTE ADULT - ASSESSMENT
72 y/o female PMH DM, severe AS, EF 60%, HTN, HLD, anemia who was admitted to [facility] with syncope. CT negative for CVA. subsequent echo showed severe AS. Transferred to [facility] under Dr. Moon for evaluation for TAVR vs BAV. Upon review of structural scans, patient was deemed an inappropriate candidate for TAVR 2/2 size of her aorta. Will now be planned for surgical AVR with Dr. Reno next week, plan for patient to undergo preoperative work-up.      72 y/o female PMH DM, severe AS, EF 60%, HTN, HLD, anemia who was admitted to [facility] with syncope. CT negative for CVA. subsequent echo showed severe AS. Transferred to [facility] under Dr. Moon for evaluation for TAVR vs BAV. Upon review of structural scans, patient was deemed an inappropriate candidate for TAVR 2/2 size of her aorta. Will now be planned for surgical AVR with Dr. Reno next week, plan for patient to undergo preoperative work-up.     Neuro:   No current pain, adding PRN APAP  No delirium, no focal deficits     Cardiac:   Pre-Op for Surgical AVR  - f/u PST  - continue metoprolol 12.5 mg BID   - statin, ASA 81 mg  Vital signs stable over last 24 hours   - HR:   - BP: 118-144/58-65  HTN:   - metoprolol 12.5 mg BID  - holding home amlodipine-benzepril     Pulm:   Saturating well on room air   Encouraging IS   CXR: no active pathology     GI:   Tolerating diet well   GI PPx: Protonix  Continue with bowel regimen     Renal:   Monitor I/Os   BUN/Cr stable @ 12/0.67    Heme:   H&H stable @ 11.0/35.1  DVT prophylaxis: SCDs and SQH 5000U     ID:   Afebrile, WBC stable @ 6.4   Monitor fever curve     MSK:   Encourage ambulation   PT/OT     Endocrine:   Hx of DM   - A1C: 9.6   No Hx of Thyroid Disease  - TSH: 1.530  Monitor blood glucose levels     Dispo:  Pre-op planning

## 2024-03-13 NOTE — CONSULT NOTE ADULT - ASSESSMENT
74 y/o female PMH DM, severe AS, EF 60%, HTN, HLD, anemia who was admitted to [facility] with syncope. CT negative for CVA. subsequent echo showed severe AS. Transferred to [facility] under Dr. Moon for evaluation for TAVR vs BAV.      A1C: 9.6 %  BUN: 12  Creatinine: 0.67  GFR: 93  Weight: 55.9  BMI: 22.5 72 y/o female PMH DM, severe AS, EF 60%, HTN, HLD, anemia who was admitted to [facility] with syncope. CT negative for CVA, but echo showed severe AS. Transferred to [facility] for SAVR.    Endocrine consulted for T2 diabetes management.    A1C: 9.6 %. She cannot recall her last A1C value at PCP.  BUN: 12  Creatinine: 0.67  GFR: 93  Weight: 55.9  BMI: 22.5

## 2024-03-14 PROBLEM — I10 ESSENTIAL (PRIMARY) HYPERTENSION: Chronic | Status: ACTIVE | Noted: 2024-03-13

## 2024-03-14 PROBLEM — E11.9 TYPE 2 DIABETES MELLITUS WITHOUT COMPLICATIONS: Chronic | Status: ACTIVE | Noted: 2024-03-13

## 2024-03-14 PROBLEM — I35.0 NONRHEUMATIC AORTIC (VALVE) STENOSIS: Chronic | Status: ACTIVE | Noted: 2024-03-13

## 2024-03-14 PROBLEM — D64.9 ANEMIA, UNSPECIFIED: Chronic | Status: ACTIVE | Noted: 2024-03-13

## 2024-03-14 PROBLEM — E78.5 HYPERLIPIDEMIA, UNSPECIFIED: Chronic | Status: ACTIVE | Noted: 2024-03-13

## 2024-03-14 LAB
ALBUMIN SERPL ELPH-MCNC: 3.6 G/DL — SIGNIFICANT CHANGE UP (ref 3.3–5)
ALP SERPL-CCNC: 56 U/L — SIGNIFICANT CHANGE UP (ref 40–120)
ALT FLD-CCNC: 17 U/L — SIGNIFICANT CHANGE UP (ref 10–45)
ANION GAP SERPL CALC-SCNC: 11 MMOL/L — SIGNIFICANT CHANGE UP (ref 5–17)
APPEARANCE UR: CLEAR — SIGNIFICANT CHANGE UP
APTT BLD: 30.2 SEC — SIGNIFICANT CHANGE UP (ref 24.5–35.6)
AST SERPL-CCNC: 18 U/L — SIGNIFICANT CHANGE UP (ref 10–40)
BILIRUB SERPL-MCNC: 0.4 MG/DL — SIGNIFICANT CHANGE UP (ref 0.2–1.2)
BILIRUB UR-MCNC: NEGATIVE — SIGNIFICANT CHANGE UP
BUN SERPL-MCNC: 12 MG/DL — SIGNIFICANT CHANGE UP (ref 7–23)
CALCIUM SERPL-MCNC: 9.3 MG/DL — SIGNIFICANT CHANGE UP (ref 8.4–10.5)
CHLORIDE SERPL-SCNC: 106 MMOL/L — SIGNIFICANT CHANGE UP (ref 96–108)
CHOLEST SERPL-MCNC: 79 MG/DL — SIGNIFICANT CHANGE UP
CK MB CFR SERPL CALC: 1.6 NG/ML — SIGNIFICANT CHANGE UP (ref 0–6.7)
CK SERPL-CCNC: 40 U/L — SIGNIFICANT CHANGE UP (ref 25–170)
CO2 SERPL-SCNC: 26 MMOL/L — SIGNIFICANT CHANGE UP (ref 22–31)
COLOR SPEC: YELLOW — SIGNIFICANT CHANGE UP
CREAT SERPL-MCNC: 0.63 MG/DL — SIGNIFICANT CHANGE UP (ref 0.5–1.3)
DIFF PNL FLD: NEGATIVE — SIGNIFICANT CHANGE UP
EGFR: 95 ML/MIN/1.73M2 — SIGNIFICANT CHANGE UP
GLUCOSE BLDC GLUCOMTR-MCNC: 119 MG/DL — HIGH (ref 70–99)
GLUCOSE BLDC GLUCOMTR-MCNC: 121 MG/DL — HIGH (ref 70–99)
GLUCOSE BLDC GLUCOMTR-MCNC: 245 MG/DL — HIGH (ref 70–99)
GLUCOSE BLDC GLUCOMTR-MCNC: 301 MG/DL — HIGH (ref 70–99)
GLUCOSE BLDC GLUCOMTR-MCNC: 419 MG/DL — HIGH (ref 70–99)
GLUCOSE SERPL-MCNC: 158 MG/DL — HIGH (ref 70–99)
GLUCOSE UR QL: >=1000 MG/DL
HCT VFR BLD CALC: 32.3 % — LOW (ref 34.5–45)
HCV AB S/CO SERPL IA: 0.03 S/CO — SIGNIFICANT CHANGE UP
HCV AB SERPL-IMP: SIGNIFICANT CHANGE UP
HDLC SERPL-MCNC: 35 MG/DL — LOW
HGB BLD-MCNC: 10 G/DL — LOW (ref 11.5–15.5)
INR BLD: 1 — SIGNIFICANT CHANGE UP (ref 0.85–1.18)
KETONES UR-MCNC: ABNORMAL MG/DL
LEUKOCYTE ESTERASE UR-ACNC: NEGATIVE — SIGNIFICANT CHANGE UP
LIPID PNL WITH DIRECT LDL SERPL: 32 MG/DL — SIGNIFICANT CHANGE UP
MAGNESIUM SERPL-MCNC: 2 MG/DL — SIGNIFICANT CHANGE UP (ref 1.6–2.6)
MCHC RBC-ENTMCNC: 24.8 PG — LOW (ref 27–34)
MCHC RBC-ENTMCNC: 31 GM/DL — LOW (ref 32–36)
MCV RBC AUTO: 80.1 FL — SIGNIFICANT CHANGE UP (ref 80–100)
NITRITE UR-MCNC: NEGATIVE — SIGNIFICANT CHANGE UP
NON HDL CHOLESTEROL: 44 MG/DL — SIGNIFICANT CHANGE UP
NRBC # BLD: 0 /100 WBCS — SIGNIFICANT CHANGE UP (ref 0–0)
PH UR: 5.5 — SIGNIFICANT CHANGE UP (ref 5–8)
PHOSPHATE SERPL-MCNC: 4 MG/DL — SIGNIFICANT CHANGE UP (ref 2.5–4.5)
PLATELET # BLD AUTO: 330 K/UL — SIGNIFICANT CHANGE UP (ref 150–400)
POTASSIUM SERPL-MCNC: 3.2 MMOL/L — LOW (ref 3.5–5.3)
POTASSIUM SERPL-SCNC: 3.2 MMOL/L — LOW (ref 3.5–5.3)
PROT SERPL-MCNC: 6.2 G/DL — SIGNIFICANT CHANGE UP (ref 6–8.3)
PROT UR-MCNC: SIGNIFICANT CHANGE UP MG/DL
PROTHROM AB SERPL-ACNC: 11.4 SEC — SIGNIFICANT CHANGE UP (ref 9.5–13)
RBC # BLD: 4.03 M/UL — SIGNIFICANT CHANGE UP (ref 3.8–5.2)
RBC # FLD: 14.1 % — SIGNIFICANT CHANGE UP (ref 10.3–14.5)
SODIUM SERPL-SCNC: 143 MMOL/L — SIGNIFICANT CHANGE UP (ref 135–145)
SP GR SPEC: >1.03 — HIGH (ref 1–1.03)
TRIGL SERPL-MCNC: 58 MG/DL — SIGNIFICANT CHANGE UP
TROPONIN T, HIGH SENSITIVITY RESULT: 26 NG/L — SIGNIFICANT CHANGE UP (ref 0–51)
UROBILINOGEN FLD QL: 0.2 MG/DL — SIGNIFICANT CHANGE UP (ref 0.2–1)
WBC # BLD: 4.83 K/UL — SIGNIFICANT CHANGE UP (ref 3.8–10.5)
WBC # FLD AUTO: 4.83 K/UL — SIGNIFICANT CHANGE UP (ref 3.8–10.5)

## 2024-03-14 PROCEDURE — 93454 CORONARY ARTERY ANGIO S&I: CPT | Mod: 26

## 2024-03-14 PROCEDURE — 99232 SBSQ HOSP IP/OBS MODERATE 35: CPT

## 2024-03-14 PROCEDURE — 99152 MOD SED SAME PHYS/QHP 5/>YRS: CPT

## 2024-03-14 PROCEDURE — 94010 BREATHING CAPACITY TEST: CPT | Mod: 26

## 2024-03-14 RX ORDER — SEMAGLUTIDE 0.68 MG/ML
0.25 INJECTION, SOLUTION SUBCUTANEOUS
Qty: 1 | Refills: 0
Start: 2024-03-14

## 2024-03-14 RX ORDER — POTASSIUM CHLORIDE 20 MEQ
40 PACKET (EA) ORAL EVERY 4 HOURS
Refills: 0 | Status: COMPLETED | OUTPATIENT
Start: 2024-03-14 | End: 2024-03-14

## 2024-03-14 RX ORDER — HEPARIN SODIUM 5000 [USP'U]/ML
5000 INJECTION INTRAVENOUS; SUBCUTANEOUS EVERY 8 HOURS
Refills: 0 | Status: DISCONTINUED | OUTPATIENT
Start: 2024-03-14 | End: 2024-03-19

## 2024-03-14 RX ORDER — CHLORHEXIDINE GLUCONATE 213 G/1000ML
1 SOLUTION TOPICAL ONCE
Refills: 0 | Status: COMPLETED | OUTPATIENT
Start: 2024-03-14 | End: 2024-03-18

## 2024-03-14 RX ORDER — SODIUM CHLORIDE 9 MG/ML
1000 INJECTION INTRAMUSCULAR; INTRAVENOUS; SUBCUTANEOUS
Refills: 0 | Status: DISCONTINUED | OUTPATIENT
Start: 2024-03-14 | End: 2024-03-16

## 2024-03-14 RX ADMIN — Medication 8: at 22:18

## 2024-03-14 RX ADMIN — Medication 8: at 16:51

## 2024-03-14 RX ADMIN — Medication 4: at 07:36

## 2024-03-14 RX ADMIN — LATANOPROST 1 DROP(S): 0.05 SOLUTION/ DROPS OPHTHALMIC; TOPICAL at 22:17

## 2024-03-14 RX ADMIN — Medication 1 DROP(S): at 05:18

## 2024-03-14 RX ADMIN — Medication 25 MILLIGRAM(S): at 05:17

## 2024-03-14 RX ADMIN — SODIUM CHLORIDE 3 MILLILITER(S): 9 INJECTION INTRAMUSCULAR; INTRAVENOUS; SUBCUTANEOUS at 22:28

## 2024-03-14 RX ADMIN — PANTOPRAZOLE SODIUM 40 MILLIGRAM(S): 20 TABLET, DELAYED RELEASE ORAL at 05:17

## 2024-03-14 RX ADMIN — ATORVASTATIN CALCIUM 40 MILLIGRAM(S): 80 TABLET, FILM COATED ORAL at 22:17

## 2024-03-14 RX ADMIN — HEPARIN SODIUM 5000 UNIT(S): 5000 INJECTION INTRAVENOUS; SUBCUTANEOUS at 22:17

## 2024-03-14 RX ADMIN — Medication 40 MILLIEQUIVALENT(S): at 18:00

## 2024-03-14 RX ADMIN — SODIUM CHLORIDE 75 MILLILITER(S): 9 INJECTION INTRAMUSCULAR; INTRAVENOUS; SUBCUTANEOUS at 14:22

## 2024-03-14 RX ADMIN — SODIUM CHLORIDE 3 MILLILITER(S): 9 INJECTION INTRAMUSCULAR; INTRAVENOUS; SUBCUTANEOUS at 05:07

## 2024-03-14 RX ADMIN — SODIUM CHLORIDE 3 MILLILITER(S): 9 INJECTION INTRAMUSCULAR; INTRAVENOUS; SUBCUTANEOUS at 15:54

## 2024-03-14 RX ADMIN — Medication 40 MILLIEQUIVALENT(S): at 08:42

## 2024-03-14 RX ADMIN — INSULIN GLARGINE 14 UNIT(S): 100 INJECTION, SOLUTION SUBCUTANEOUS at 22:19

## 2024-03-14 RX ADMIN — Medication 4 UNIT(S): at 16:51

## 2024-03-14 NOTE — PROGRESS NOTE ADULT - ASSESSMENT
72 y/o female PMH DM, severe AS, EF 60%, HTN, HLD, anemia who was admitted to [facility] with syncope. CT negative for CVA. Echo showed severe AS. Transferred to [facility] under Dr. Moon for evaluation for TAVR vs BAV. Upon review of structural scans, patient was deemed an inappropriate candidate for TAVR 2/2 size of aorta. CTS consulted for evaluation for AVR. Plan to undergo cardiac cath today.    Plan:    Neurovascular:   -Pain well controlled with current regimen. PRN's: tylenol    Cardiovascular:   preop AVR  -not a candidate for TAVR 2/2 anatomy  -continue ASA/statin/BB  -pending cardiac cath today  -Hemodynamically stable.   -Monitor: BP, HR, tele    Respiratory:   -Oxygenating well on room air  -Encourage continued use of IS 10x/hr and frequent ambulation    GI:  -GI PPX: continue protonix  -PO Diet  -Bowel Regimen: continue senna/miralax    Renal / :  -Continue to monitor renal function: BUN/Cr: 12/0.63  -Monitor I/O's daily     Endocrine:    hx of DM   -A1c: 9.6  -endo consulted and following  -continue lantus 14u, lispro 4u, MISS  no hx thyroid disease  -TSH: 1.530    Hematologic:  -CBC: H/H- 10/12/0.63  -Coagulation Panel.    ID:  -Temperature: afebrile  -CBC: WBC- 4.83    Prophylaxis:  -DVT prophylaxis with 5000 SubQ Heparin q8h.  -Continue with SCD's b/l while patient is at rest     Disposition:  -OR plan pending

## 2024-03-14 NOTE — CONSULT NOTE ADULT - ASSESSMENT
74 y/o female PMH DM, severe AS, EF 60%, HTN, HLD, anemia who was admitted to [facility] with syncope. CT negative for CVA. Echo showed severe AS. Transferred to [facility] under Dr. Moon for evaluation for TAVR vs BAV. Upon review of structural scans, patient was deemed an inappropriate candidate for TAVR 2/2 size of aorta. CTS consulted for evaluation for AVR. Plan to undergo cardiac cath today.    Plan:    Neurovascular:   -Pain well controlled with current regimen. PRN's: tylenol    Cardiovascular:   preop AVR  -not a candidate for TAVR 2/2 anatomy  -continue ASA/statin/BB  -CT surgery consult  -pending cardiac cath today  -Hemodynamically stable.   -Monitor: BP, HR, tele    Respiratory:   -Oxygenating well on room air  -Encourage continued use of IS 10x/hr and frequent ambulation    GI:  -GI PPX: continue protonix  -PO Diet  -Bowel Regimen: continue senna/miralax    Renal / :  -Continue to monitor renal function: BUN/Cr: 12/0.63  -Monitor I/O's daily     Endocrine:    hx of DM   -A1c: 9.6  -endo consulted and following  -continue lantus 14u, lispro 4u, MISS  no hx thyroid disease  -TSH: 1.530    Hematologic:  -CBC: H/H- 10.12/0.63  -Coagulation Panel.    ID:  -Temperature: afebrile  -CBC: WBC- 4.83    Prophylaxis:  -DVT prophylaxis with 5000 SubQ Heparin q8h.  -Continue with SCD's b/l while patient is at rest     Disposition:  -OR plan pending

## 2024-03-14 NOTE — PROGRESS NOTE ADULT - ASSESSMENT
72 y/o female PMH DM, severe AS, EF 60%, HTN, HLD, anemia who was admitted to [facility] with syncope. CT negative for CVA, but echo showed severe AS. Transferred to [facility] for SAVR.    Endocrine consulted for T2 diabetes management. Education deficit, but responsive to information provided.    A1C: 9.6 %  BUN: 12  Creatinine: 0.63  GFR: 95  Weight: 55.9  BMI: 22.5  EF: 60%

## 2024-03-14 NOTE — CONSULT NOTE ADULT - NSCONSULTADDITIONALINFOA_GEN_ALL_CORE
I, Omar Rhoades MD, attest that I have personally reviewed this patients history and investigations in detail, including interpretation of all diagnostic imaging. I have also personally reviewed this patient face to face to complete a diagnostic evaluation. I have explained to them their diagnosis in the context of my specialty of cardiovascular surgery, and agreed on a follow-up plan. I agree with the ACP note written above. My overall assessment of this patient is: suitable for aortic root replacement in view of 21mm STJ. Case discussed within heart team with agreement.

## 2024-03-14 NOTE — PROGRESS NOTE ADULT - PROBLEM SELECTOR PLAN 1
Type 2 diabetes mellitus with hyperglycemia  - Please give lantus units at bedtime.   - Give lispro  units before each meal.  - Continue lispro moderate dose sliding scale before meals and at bedtime.  - Patient's fingerstick glucose goal is 100-180 mg/dL.    - For discharge, patient can ***.    - Patient can follow up at discharge with [facility] by calling [phone] to make an appointment.      Case discussed with Dr. whitman/ Primary team updated. Type 2 diabetes mellitus with hyperglycemia  - Please give lantus 16 units at bedtime.   - Give lispro 8 units before each meal.  - Continue lispro moderate dose sliding scale before meals and at bedtime.  - Patient's fingerstick glucose goal is 100-180 mg/dL.    - For discharge, patient can ***.    - Patient can follow up at discharge with [facility] by calling [phone] to make an appointment.      Case discussed with Dr. whitman/ Primary team updated.

## 2024-03-14 NOTE — PROGRESS NOTE ADULT - SUBJECTIVE AND OBJECTIVE BOX
SUBJECTIVE / INTERVAL HPI: Patient was seen and examined this morning. NPO for cardiac cath today. No new complaints. We discussed pathophysiology of t2dm. We discussed potentially starting GLP-1 and CGM at discharge; she will think about it. She ate chips overnight, which "are salty not sweet." We discussed how chips turn into glucose in the body and can elevate glucose even though they are not inherently considered "sweets".    CAPILLARY BLOOD GLUCOSE & INSULIN RECEIVED  202 mg/dL (03-13 @ 17:27) - Lispro 4+4. Ate salmon, veggies, potatoes, cheesecake fruit cup.  303 mg/dL (03-13 @ 21:29) - Ate chips. Lantus 14 + lispro 4  158 mg/dL (03-14 @ 05:30)  245 mg/dL (03-14 @ 07:07) - Lispro 4. NPO  121 mg/dL (03-14 @ 11:23)      REVIEW OF SYSTEMS  Constitutional:  Negative fever, chills or loss of appetite.  Eyes:  Negative blurry vision or double vision.  Cardiovascular:  Negative for chest pain or palpitations.  Respiratory:  Negative for cough, wheezing, or shortness of breath.    Gastrointestinal:  Negative for nausea, vomiting, diarrhea, constipation, or abdominal pain.  Genitourinary:  Negative frequency, urgency or dysuria.  Neurologic:  No headache, confusion, dizziness, lightheadedness.    PHYSICAL EXAM  Vital Signs Last 24 Hrs  T(C): 36.7 (14 Mar 2024 09:37), Max: 37.1 (13 Mar 2024 21:19)  T(F): 98.1 (14 Mar 2024 09:37), Max: 98.8 (13 Mar 2024 21:19)  HR: 66 (14 Mar 2024 08:30) (66 - 158)  BP: 115/53 (14 Mar 2024 08:30) (92/68 - 127/62)  BP(mean): 76 (14 Mar 2024 08:30) (69 - 89)  RR: 16 (14 Mar 2024 08:30) (12 - 20)  SpO2: 96% (14 Mar 2024 08:30) (93% - 98%)    Parameters below as of 14 Mar 2024 08:30  Patient On (Oxygen Delivery Method): room air      Constitutional: Awake, alert, in no acute distress.   HEENT: Normocephalic, atraumatic, GEORGETTE, no proptosis or lid retraction.   Neck: supple, no acanthosis, no thyromegaly or palpable thyroid nodules.  Respiratory: Lungs clear to ausculation bilaterally.   Cardiovascular: regular rhythm, normal S1 and S2, + murmur  GI: soft, non-tender, non-distended, bowel sounds present, no masses appreciated.  Extremities: No lower extremity edema, peripheral pulses present.   Skin: no rashes.   Psychiatric: AAO x 3. Normal affect/mood.       LABS  CBC - WBC/HGB/HTC/PLT: 4.83/10.0/32.3/330 (03-14-24)  BMP - Na/K/Cl/Bicarb/BUN/Cr/Gluc/AG/eGFR: 143/3.2/106/26/12/0.63/158/11/95 (03-14-24)  Ca - 9.3 (03-14-24)  Phos - 4.0 (03-14-24)  Mg - 2.0 (03-14-24)  LFT - Alb/Tprot/Tbili/Dbili/AlkPhos/ALT/AST: 3.6/--/0.4/--/56/17/18 (03-14-24)  PT/aPTT/INR: 11.4/30.2/1.00 (03-14-24)   Thyroid Stimulating Hormone, Serum: 1.530 (03-12-24)      MEDICATIONS  MEDICATIONS  (STANDING):  aspirin  chewable 81 milliGRAM(s) Oral daily  atorvastatin 40 milliGRAM(s) Oral at bedtime  chlorhexidine 4% Liquid 1 Application(s) Topical once  dextrose 5%. 1000 milliLiter(s) (50 mL/Hr) IV Continuous <Continuous>  dextrose 5%. 1000 milliLiter(s) (100 mL/Hr) IV Continuous <Continuous>  dextrose 50% Injectable 12.5 Gram(s) IV Push once  dextrose 50% Injectable 25 Gram(s) IV Push once  dextrose 50% Injectable 25 Gram(s) IV Push once  glucagon  Injectable 1 milliGRAM(s) IntraMuscular once  heparin   Injectable 5000 Unit(s) SubCutaneous every 8 hours  insulin glargine Injectable (LANTUS) 14 Unit(s) SubCutaneous at bedtime  insulin lispro (ADMELOG) corrective regimen sliding scale   SubCutaneous at bedtime  insulin lispro (ADMELOG) corrective regimen sliding scale   SubCutaneous three times a day before meals  insulin lispro Injectable (ADMELOG) 4 Unit(s) SubCutaneous three times a day before meals  lactated ringers. 1000 milliLiter(s) (50 mL/Hr) IV Continuous <Continuous>  latanoprost 0.005% Ophthalmic Solution 1 Drop(s) Both EYES at bedtime  metoprolol tartrate 25 milliGRAM(s) Oral every 12 hours  pantoprazole    Tablet 40 milliGRAM(s) Oral before breakfast  polyethylene glycol 3350 17 Gram(s) Oral daily  potassium chloride    Tablet ER 40 milliEquivalent(s) Oral every 4 hours  senna 2 Tablet(s) Oral at bedtime  sodium chloride 0.9% lock flush 3 milliLiter(s) IV Push every 8 hours  timolol 0.5% Solution 1 Drop(s) Both EYES two times a day    MEDICATIONS  (PRN):  acetaminophen     Tablet .. 650 milliGRAM(s) Oral every 6 hours PRN Mild Pain (1 - 3)  dextrose Oral Gel 15 Gram(s) Oral once PRN Blood Glucose LESS THAN 70 milliGRAM(s)/deciliter

## 2024-03-14 NOTE — PROGRESS NOTE ADULT - SUBJECTIVE AND OBJECTIVE BOX
Patient discussed on morning rounds with Dr. Doyle    OPERATION & DATE: pre-op AVR    SUBJECTIVE ASSESSMENT:  73yoM seen and examined. Patient NPO for cath today. No complaints. Satting well on room air, ambulating independently. Denies lightheadedness, headache, CP, palpitations, SOB, abdominal pain, nausea, vomiting, fever, chills.    VITAL SIGNS:  Vital Signs Last 24 Hrs  T(C): 36.7 (14 Mar 2024 09:37), Max: 37.1 (13 Mar 2024 21:19)  T(F): 98.1 (14 Mar 2024 09:37), Max: 98.8 (13 Mar 2024 21:19)  HR: 66 (14 Mar 2024 08:30) (66 - 158)  BP: 115/53 (14 Mar 2024 08:30) (92/68 - 127/62)  BP(mean): 76 (14 Mar 2024 08:30) (69 - 89)  RR: 16 (14 Mar 2024 08:30) (12 - 20)  SpO2: 96% (14 Mar 2024 08:30) (93% - 98%)    Parameters below as of 14 Mar 2024 08:30  Patient On (Oxygen Delivery Method): room air      I&O's Detail    13 Mar 2024 07:01  -  14 Mar 2024 07:00  --------------------------------------------------------  IN:    Lactated Ringers: 50 mL    Oral Fluid: 240 mL  Total IN: 290 mL    OUT:    Voided (mL): 100 mL  Total OUT: 100 mL    Total NET: 190 mL      PHYSICAL EXAM:  General: NAD, sitting comfortably in bed, conversing appropriately  Neurological: alert and oriented, UE and LE strength equal b/l, facial symmetry present  Cardiovascular: RRR, Clear S1 and S2, +systolic murmur  Respiratory: chest expansion symmetrical, CTA b/l, no wheezing noted  Gastrointestinal: +BS, soft, NT, ND  Extremities: moving spontaneously, no calf tenderness or edema.  Vascular: warm, well perfused.  Incisions: none    LABS:                        10.0   4.83  )-----------( 330      ( 14 Mar 2024 05:30 )             32.3     PT/INR - ( 14 Mar 2024 05:30 )   PT: 11.4 sec;   INR: 1.00          PTT - ( 14 Mar 2024 05:30 )  PTT:30.2 sec  03-14    143  |  106  |  12  ----------------------------<  158<H>  3.2<L>   |  26  |  0.63    Ca    9.3      14 Mar 2024 05:30  Phos  4.0     03-14  Mg     2.0     03-14    TPro  6.2  /  Alb  3.6  /  TBili  0.4  /  DBili  x   /  AST  18  /  ALT  17  /  AlkPhos  56  03-14    Urinalysis Basic - ( 14 Mar 2024 05:30 )    Color: x / Appearance: x / SG: x / pH: x  Gluc: 158 mg/dL / Ketone: x  / Bili: x / Urobili: x   Blood: x / Protein: x / Nitrite: x   Leuk Esterase: x / RBC: x / WBC x   Sq Epi: x / Non Sq Epi: x / Bacteria: x      MEDICATIONS  (STANDING):  aspirin  chewable 81 milliGRAM(s) Oral daily  atorvastatin 40 milliGRAM(s) Oral at bedtime  chlorhexidine 4% Liquid 1 Application(s) Topical once  dextrose 5%. 1000 milliLiter(s) (100 mL/Hr) IV Continuous <Continuous>  dextrose 5%. 1000 milliLiter(s) (50 mL/Hr) IV Continuous <Continuous>  dextrose 50% Injectable 25 Gram(s) IV Push once  dextrose 50% Injectable 25 Gram(s) IV Push once  dextrose 50% Injectable 12.5 Gram(s) IV Push once  glucagon  Injectable 1 milliGRAM(s) IntraMuscular once  heparin   Injectable 5000 Unit(s) SubCutaneous every 8 hours  insulin glargine Injectable (LANTUS) 14 Unit(s) SubCutaneous at bedtime  insulin lispro (ADMELOG) corrective regimen sliding scale   SubCutaneous at bedtime  insulin lispro (ADMELOG) corrective regimen sliding scale   SubCutaneous three times a day before meals  insulin lispro Injectable (ADMELOG) 4 Unit(s) SubCutaneous three times a day before meals  lactated ringers. 1000 milliLiter(s) (50 mL/Hr) IV Continuous <Continuous>  latanoprost 0.005% Ophthalmic Solution 1 Drop(s) Both EYES at bedtime  metoprolol tartrate 25 milliGRAM(s) Oral every 12 hours  pantoprazole    Tablet 40 milliGRAM(s) Oral before breakfast  polyethylene glycol 3350 17 Gram(s) Oral daily  potassium chloride    Tablet ER 40 milliEquivalent(s) Oral every 4 hours  senna 2 Tablet(s) Oral at bedtime  sodium chloride 0.9% lock flush 3 milliLiter(s) IV Push every 8 hours  timolol 0.5% Solution 1 Drop(s) Both EYES two times a day    MEDICATIONS  (PRN):  acetaminophen     Tablet .. 650 milliGRAM(s) Oral every 6 hours PRN Mild Pain (1 - 3)  dextrose Oral Gel 15 Gram(s) Oral once PRN Blood Glucose LESS THAN 70 milliGRAM(s)/deciliter    RADIOLOGY & ADDITIONAL TESTS:

## 2024-03-14 NOTE — CONSULT NOTE ADULT - SUBJECTIVE AND OBJECTIVE BOX
Surgeon:     Requesting Physician: Dr. Moon    HISTORY OF PRESENT ILLNESS:  72 y/o female PMH DM, severe AS, EF 60%, HTN, HLD, anemia who was admitted to [facility] with syncope. CT negative for CVA. Echo showed severe AS. Transferred to [facility] under Dr. Moon for evaluation for TAVR vs BAV. Upon review of structural scans, patient was deemed an inappropriate candidate for TAVR 2/2 size of aorta. CTS consulted for evaluation for AVR. Plan to undergo cardiac cath today. Preop workup pending. Patient currently denies lightheadedness, headache, CP, palpitations, SOB, abdominal pain, nausea, vomiting, fever, chills.    PAST MEDICAL & SURGICAL HISTORY:  Aortic stenosis      HTN (hypertension)      DM (diabetes mellitus)      Hyperlipidemia      Anemia      No significant past surgical history          MEDICATIONS  (STANDING):  aspirin  chewable 81 milliGRAM(s) Oral daily  atorvastatin 40 milliGRAM(s) Oral at bedtime  chlorhexidine 4% Liquid 1 Application(s) Topical once  dextrose 5%. 1000 milliLiter(s) (50 mL/Hr) IV Continuous <Continuous>  dextrose 5%. 1000 milliLiter(s) (100 mL/Hr) IV Continuous <Continuous>  dextrose 50% Injectable 12.5 Gram(s) IV Push once  dextrose 50% Injectable 25 Gram(s) IV Push once  dextrose 50% Injectable 25 Gram(s) IV Push once  glucagon  Injectable 1 milliGRAM(s) IntraMuscular once  heparin   Injectable 5000 Unit(s) SubCutaneous every 8 hours  insulin glargine Injectable (LANTUS) 14 Unit(s) SubCutaneous at bedtime  insulin lispro (ADMELOG) corrective regimen sliding scale   SubCutaneous three times a day before meals  insulin lispro (ADMELOG) corrective regimen sliding scale   SubCutaneous at bedtime  insulin lispro Injectable (ADMELOG) 4 Unit(s) SubCutaneous three times a day before meals  lactated ringers. 1000 milliLiter(s) (50 mL/Hr) IV Continuous <Continuous>  latanoprost 0.005% Ophthalmic Solution 1 Drop(s) Both EYES at bedtime  metoprolol tartrate 25 milliGRAM(s) Oral every 12 hours  pantoprazole    Tablet 40 milliGRAM(s) Oral before breakfast  polyethylene glycol 3350 17 Gram(s) Oral daily  potassium chloride    Tablet ER 40 milliEquivalent(s) Oral every 4 hours  senna 2 Tablet(s) Oral at bedtime  sodium chloride 0.9% lock flush 3 milliLiter(s) IV Push every 8 hours  timolol 0.5% Solution 1 Drop(s) Both EYES two times a day    MEDICATIONS  (PRN):  acetaminophen     Tablet .. 650 milliGRAM(s) Oral every 6 hours PRN Mild Pain (1 - 3)  dextrose Oral Gel 15 Gram(s) Oral once PRN Blood Glucose LESS THAN 70 milliGRAM(s)/deciliter      Allergies    No Known Allergies    Intolerances        SOCIAL HISTORY:  Smoker:  former smoker  ETOH use:  denies  Ilicit Drug use:  denies    FAMILY HISTORY:  No pertinent family history in first degree relatives        Review of Systems (Need 10):  CONSTITUTIONAL: Denies fevers / chills, sweats, fatigue, weight loss, weight gain                                       NEURO:  Denies parathesias, seizures, syncope, confusion                                                                                  EYES:  Denies blurry vision, discharge, pain, loss of vision                                                                                    ENMT:  Denies difficulty hearing, vertigo, dysphagia, epistaxis, recent dental work                                       CV:  Denies chest pain, palpitations, SPIVEY, orthopnea                                                                                           RESPIRATORY:  Denies wWheezing, SOB, cough / sputum, hemoptysis                                                               GI:  Denies nausea, vomiting, diarrhea, constipation, melena                                                                          : Denies hematuria, dysuria, urgency, incontinence                                                                                          MUSKULOSKELETAL:  Denies arthritis, joint swelling, muscle weakness                                                             SKIN/BREAST:  Denies rash, itching, hair loss, masses                                                                                              PSYCH:  Denies depression, anxiety, suicidal ideation                                                                                                HEME/LYMPH:  Denies bruises easily, enlarged lymph nodes, tender lymph nodes                                          ENDOCRINE:  Denies cold intolerance, heat intolerance, polydipsia                                                                      Vital Signs Last 24 Hrs  T(C): 36.7 (14 Mar 2024 09:37), Max: 37.1 (13 Mar 2024 21:19)  T(F): 98.1 (14 Mar 2024 09:37), Max: 98.8 (13 Mar 2024 21:19)  HR: 66 (14 Mar 2024 08:30) (66 - 158)  BP: 115/53 (14 Mar 2024 08:30) (92/68 - 127/62)  BP(mean): 76 (14 Mar 2024 08:30) (69 - 89)  RR: 16 (14 Mar 2024 08:30) (12 - 20)  SpO2: 96% (14 Mar 2024 08:30) (93% - 98%)    Parameters below as of 14 Mar 2024 08:30  Patient On (Oxygen Delivery Method): room air        Physical Exam   General: NAD, sitting comfortably in bed, conversing appropriately  Head: NC/AT  Neurological: alert and oriented, UE and LE strength equal b/l, facial symmetry present  Cardiovascular: RRR, Clear S1 and S2, +systolic murmur  Respiratory: chest expansion symmetrical, CTA b/l, no wheezing noted  Gastrointestinal: +BS, soft, NT, ND  Extremities: moving spontaneously, no calf tenderness or edema.  Vascular: warm, well perfused.  Skin: no obvious rashes noted  Incisions: none                                                          LABS:                        10.0   4.83  )-----------( 330      ( 14 Mar 2024 05:30 )             32.3     03-14    143  |  106  |  12  ----------------------------<  158<H>  3.2<L>   |  26  |  0.63    Ca    9.3      14 Mar 2024 05:30  Phos  4.0     03-14  Mg     2.0     03-14    TPro  6.2  /  Alb  3.6  /  TBili  0.4  /  DBili  x   /  AST  18  /  ALT  17  /  AlkPhos  56  03-14    PT/INR - ( 14 Mar 2024 05:30 )   PT: 11.4 sec;   INR: 1.00          PTT - ( 14 Mar 2024 05:30 )  PTT:30.2 sec  Urinalysis Basic - ( 14 Mar 2024 05:30 )    Color: x / Appearance: x / SG: x / pH: x  Gluc: 158 mg/dL / Ketone: x  / Bili: x / Urobili: x   Blood: x / Protein: x / Nitrite: x   Leuk Esterase: x / RBC: x / WBC x   Sq Epi: x / Non Sq Epi: x / Bacteria: x      CARDIAC MARKERS ( 14 Mar 2024 05:30 )  x     / x     / 40 U/L / x     / 1.6 ng/mL          RADIOLOGY & ADDITIONAL STUDIES:  CAROTID U/S:  < from: US Duplex Carotid Arteries Complete, Bilateral (03.13.24 @ 19:27) >  Antegrade flow is noted within both vertebral arteries.    IMPRESSION: No significant hemodynamic stenosis of either carotid artery.    Measurement of carotid stenosis is based on velocity parameters that   correlate the residual internal carotid diameter with that of the more   distal vessel in accordance with a method such as the [place] American   Symptomatic Carotid Endarterectomy Trial (NASCET).    < end of copied text >      CXR:  < from: Xray Chest 1 View AP/PA (03.12.24 @ 21:52) >  IMPRESSION: No acute pulmonary pathology.    < end of copied text >    CT Scan:  < from: CT Angio Heart Structural w/ IV Cont (07.14.23 @ 14:08) >  IMPRESSION:    Cardiac:  1.  Moderate proximal RCA stenosis  2.  Non-obstructive disease in the remaining coronary segments  3.  Trileaflet aortic valve with leaflet thickening and calcification    < end of copied text >  < from: CT Angio Abdomen and Pelvis w/ IV Cont (07.14.23 @ 14:08) >  IMPRESSION:  1.  Aortic measurements as noted above.  2.  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  3.  UNEXPECTED FINDING: Abnormal gallbladder wall thickening with   cholelithiasis, cannot exclude acute cholecystitis. Clinical correlation.    < end of copied text >    EKG:  in chart    TTE / LEXIE:  < from: TTE Echo Complete w/ Contrast w/ Doppler (03.11.24 @ 10:10) >  CONCLUSIONS:     1. Normal left and right ventricular size and systolic function.   2. Mild symmetric left ventricular hypertrophy.   3. Critically severe aortic stenosis. The peak trasnvavluar velocity is   5.33 m/s, the mean transvalvular gradient is 73 mmHg, and the LVOT/AV   ratio is 0.16. Rick aortic valve area (estimated via the continuity   method) is 0.43cm2.   4. Normal atria   5. No evidence of pulmonary hypertension.   6. No pericardial effusion.   7. Compared to the previous TTE performed on 7/3/2023, aortic valve area   has decreased.    < end of copied text >      Cardiac Cath: pending today Surgeon: Dr. Rhoades    Requesting Physician: Dr. Moon    HISTORY OF PRESENT ILLNESS:  74 y/o female PMH DM, severe AS, EF 60%, HTN, HLD, anemia who was admitted to [facility] with syncope. CT negative for CVA. Echo showed severe AS. Transferred to [facility] under Dr. Moon for evaluation for TAVR vs BAV. Upon review of structural scans, patient was deemed an inappropriate candidate for TAVR 2/2 size of aorta. CTS consulted for evaluation for AVR. Plan to undergo cardiac cath today. Preop workup pending. Patient currently denies lightheadedness, headache, CP, palpitations, SOB, abdominal pain, nausea, vomiting, fever, chills.    PAST MEDICAL & SURGICAL HISTORY:  Aortic stenosis      HTN (hypertension)      DM (diabetes mellitus)      Hyperlipidemia      Anemia      No significant past surgical history          MEDICATIONS  (STANDING):  aspirin  chewable 81 milliGRAM(s) Oral daily  atorvastatin 40 milliGRAM(s) Oral at bedtime  chlorhexidine 4% Liquid 1 Application(s) Topical once  dextrose 5%. 1000 milliLiter(s) (50 mL/Hr) IV Continuous <Continuous>  dextrose 5%. 1000 milliLiter(s) (100 mL/Hr) IV Continuous <Continuous>  dextrose 50% Injectable 12.5 Gram(s) IV Push once  dextrose 50% Injectable 25 Gram(s) IV Push once  dextrose 50% Injectable 25 Gram(s) IV Push once  glucagon  Injectable 1 milliGRAM(s) IntraMuscular once  heparin   Injectable 5000 Unit(s) SubCutaneous every 8 hours  insulin glargine Injectable (LANTUS) 14 Unit(s) SubCutaneous at bedtime  insulin lispro (ADMELOG) corrective regimen sliding scale   SubCutaneous three times a day before meals  insulin lispro (ADMELOG) corrective regimen sliding scale   SubCutaneous at bedtime  insulin lispro Injectable (ADMELOG) 4 Unit(s) SubCutaneous three times a day before meals  lactated ringers. 1000 milliLiter(s) (50 mL/Hr) IV Continuous <Continuous>  latanoprost 0.005% Ophthalmic Solution 1 Drop(s) Both EYES at bedtime  metoprolol tartrate 25 milliGRAM(s) Oral every 12 hours  pantoprazole    Tablet 40 milliGRAM(s) Oral before breakfast  polyethylene glycol 3350 17 Gram(s) Oral daily  potassium chloride    Tablet ER 40 milliEquivalent(s) Oral every 4 hours  senna 2 Tablet(s) Oral at bedtime  sodium chloride 0.9% lock flush 3 milliLiter(s) IV Push every 8 hours  timolol 0.5% Solution 1 Drop(s) Both EYES two times a day    MEDICATIONS  (PRN):  acetaminophen     Tablet .. 650 milliGRAM(s) Oral every 6 hours PRN Mild Pain (1 - 3)  dextrose Oral Gel 15 Gram(s) Oral once PRN Blood Glucose LESS THAN 70 milliGRAM(s)/deciliter      Allergies    No Known Allergies    Intolerances        SOCIAL HISTORY:  Smoker:  former smoker  ETOH use:  denies  Ilicit Drug use:  denies    FAMILY HISTORY:  No pertinent family history in first degree relatives        Review of Systems (Need 10):  CONSTITUTIONAL: Denies fevers / chills, sweats, fatigue, weight loss, weight gain                                       NEURO:  Denies parathesias, seizures, syncope, confusion                                                                                  EYES:  Denies blurry vision, discharge, pain, loss of vision                                                                                    ENMT:  Denies difficulty hearing, vertigo, dysphagia, epistaxis, recent dental work                                       CV:  Denies chest pain, palpitations, SPIVEY, orthopnea                                                                                           RESPIRATORY:  Denies wWheezing, SOB, cough / sputum, hemoptysis                                                               GI:  Denies nausea, vomiting, diarrhea, constipation, melena                                                                          : Denies hematuria, dysuria, urgency, incontinence                                                                                          MUSKULOSKELETAL:  Denies arthritis, joint swelling, muscle weakness                                                             SKIN/BREAST:  Denies rash, itching, hair loss, masses                                                                                              PSYCH:  Denies depression, anxiety, suicidal ideation                                                                                                HEME/LYMPH:  Denies bruises easily, enlarged lymph nodes, tender lymph nodes                                          ENDOCRINE:  Denies cold intolerance, heat intolerance, polydipsia                                                                      Vital Signs Last 24 Hrs  T(C): 36.7 (14 Mar 2024 09:37), Max: 37.1 (13 Mar 2024 21:19)  T(F): 98.1 (14 Mar 2024 09:37), Max: 98.8 (13 Mar 2024 21:19)  HR: 66 (14 Mar 2024 08:30) (66 - 158)  BP: 115/53 (14 Mar 2024 08:30) (92/68 - 127/62)  BP(mean): 76 (14 Mar 2024 08:30) (69 - 89)  RR: 16 (14 Mar 2024 08:30) (12 - 20)  SpO2: 96% (14 Mar 2024 08:30) (93% - 98%)    Parameters below as of 14 Mar 2024 08:30  Patient On (Oxygen Delivery Method): room air        Physical Exam   General: NAD, sitting comfortably in bed, conversing appropriately  Head: NC/AT  Neurological: alert and oriented, UE and LE strength equal b/l, facial symmetry present  Cardiovascular: RRR, Clear S1 and S2, +systolic murmur  Respiratory: chest expansion symmetrical, CTA b/l, no wheezing noted  Gastrointestinal: +BS, soft, NT, ND  Extremities: moving spontaneously, no calf tenderness or edema.  Vascular: warm, well perfused.  Skin: no obvious rashes noted  Incisions: none                                                          LABS:                        10.0   4.83  )-----------( 330      ( 14 Mar 2024 05:30 )             32.3     03-14    143  |  106  |  12  ----------------------------<  158<H>  3.2<L>   |  26  |  0.63    Ca    9.3      14 Mar 2024 05:30  Phos  4.0     03-14  Mg     2.0     03-14    TPro  6.2  /  Alb  3.6  /  TBili  0.4  /  DBili  x   /  AST  18  /  ALT  17  /  AlkPhos  56  03-14    PT/INR - ( 14 Mar 2024 05:30 )   PT: 11.4 sec;   INR: 1.00          PTT - ( 14 Mar 2024 05:30 )  PTT:30.2 sec  Urinalysis Basic - ( 14 Mar 2024 05:30 )    Color: x / Appearance: x / SG: x / pH: x  Gluc: 158 mg/dL / Ketone: x  / Bili: x / Urobili: x   Blood: x / Protein: x / Nitrite: x   Leuk Esterase: x / RBC: x / WBC x   Sq Epi: x / Non Sq Epi: x / Bacteria: x      CARDIAC MARKERS ( 14 Mar 2024 05:30 )  x     / x     / 40 U/L / x     / 1.6 ng/mL          RADIOLOGY & ADDITIONAL STUDIES:  CAROTID U/S:  < from: US Duplex Carotid Arteries Complete, Bilateral (03.13.24 @ 19:27) >  Antegrade flow is noted within both vertebral arteries.    IMPRESSION: No significant hemodynamic stenosis of either carotid artery.    Measurement of carotid stenosis is based on velocity parameters that   correlate the residual internal carotid diameter with that of the more   distal vessel in accordance with a method such as the [place] American   Symptomatic Carotid Endarterectomy Trial (NASCET).    < end of copied text >      CXR:  < from: Xray Chest 1 View AP/PA (03.12.24 @ 21:52) >  IMPRESSION: No acute pulmonary pathology.    < end of copied text >    CT Scan:  < from: CT Angio Heart Structural w/ IV Cont (07.14.23 @ 14:08) >  IMPRESSION:    Cardiac:  1.  Moderate proximal RCA stenosis  2.  Non-obstructive disease in the remaining coronary segments  3.  Trileaflet aortic valve with leaflet thickening and calcification    < end of copied text >  < from: CT Angio Abdomen and Pelvis w/ IV Cont (07.14.23 @ 14:08) >  IMPRESSION:  1.  Aortic measurements as noted above.  2.  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  3.  UNEXPECTED FINDING: Abnormal gallbladder wall thickening with   cholelithiasis, cannot exclude acute cholecystitis. Clinical correlation.    < end of copied text >    EKG:  in chart    TTE / LEXIE:  < from: TTE Echo Complete w/ Contrast w/ Doppler (03.11.24 @ 10:10) >  CONCLUSIONS:     1. Normal left and right ventricular size and systolic function.   2. Mild symmetric left ventricular hypertrophy.   3. Critically severe aortic stenosis. The peak trasnvavluar velocity is   5.33 m/s, the mean transvalvular gradient is 73 mmHg, and the LVOT/AV   ratio is 0.16. Rick aortic valve area (estimated via the continuity   method) is 0.43cm2.   4. Normal atria   5. No evidence of pulmonary hypertension.   6. No pericardial effusion.   7. Compared to the previous TTE performed on 7/3/2023, aortic valve area   has decreased.    < end of copied text >      Cardiac Cath: pending today

## 2024-03-14 NOTE — PROGRESS NOTE ADULT - SUBJECTIVE AND OBJECTIVE BOX
H/H 10.0/32.3 and platelets. Patient denies bleeding, BRBPR, melena, hematuria, hematemesis.       Pt euvolemic on exam. Cr 0.63.  EF 60%.  cc Bolus IV x 1 followed by NS 75 cc/hr x 2 hrs per Hydration Protocol.     Interventional Cardiology PA Precath Note      HPI: 72 y/o female PMH DM, severe AS, EF 60%, HTN, HLD, anemia who was admitted to [facility] with syncope. CT negative for CVA. subsequent echo showed severe AS. Transferred to [facility] under Dr. Moon for evaluation for TAVR vs BAV. Pt now presents to  for [facility] for pre-op clearance.      Anginal class 1      ALL: NKDA, NKFA. Denies shellfish/Contrast dye allergy.  SocHX: Denies EtoH/TOB/IVDU     MEDS:   acetaminophen     Tablet .. 650 milliGRAM(s) Oral every 6 hours PRN  aspirin  chewable 81 milliGRAM(s) Oral daily  atorvastatin 40 milliGRAM(s) Oral at bedtime  dextrose 5%. 1000 milliLiter(s) IV Continuous <Continuous>  dextrose 5%. 1000 milliLiter(s) IV Continuous <Continuous>  dextrose 50% Injectable 25 Gram(s) IV Push once  dextrose 50% Injectable 12.5 Gram(s) IV Push once  dextrose 50% Injectable 25 Gram(s) IV Push once  dextrose Oral Gel 15 Gram(s) Oral once PRN  glucagon  Injectable 1 milliGRAM(s) IntraMuscular once  heparin   Injectable 5000 Unit(s) SubCutaneous every 8 hours  insulin glargine Injectable (LANTUS) 14 Unit(s) SubCutaneous at bedtime  insulin lispro (ADMELOG) corrective regimen sliding scale   SubCutaneous at bedtime  insulin lispro (ADMELOG) corrective regimen sliding scale   SubCutaneous three times a day before meals  insulin lispro Injectable (ADMELOG) 4 Unit(s) SubCutaneous three times a day before meals  lactated ringers. 1000 milliLiter(s) IV Continuous <Continuous>  latanoprost 0.005% Ophthalmic Solution 1 Drop(s) Both EYES at bedtime  metoprolol tartrate 25 milliGRAM(s) Oral every 12 hours  pantoprazole    Tablet 40 milliGRAM(s) Oral before breakfast  polyethylene glycol 3350 17 Gram(s) Oral daily  potassium chloride    Tablet ER 40 milliEquivalent(s) Oral every 4 hours  senna 2 Tablet(s) Oral at bedtime  sodium chloride 0.9% lock flush 3 milliLiter(s) IV Push every 8 hours  timolol 0.5% Solution 1 Drop(s) Both EYES two times a day    T(C): 36.7 (03-14-24 @ 09:37), Max: 37.1 (03-13-24 @ 21:19)  HR: 66 (03-14-24 @ 08:30) (66 - 158)  BP: 115/53 (03-14-24 @ 08:30) (92/68 - 127/62)  RR: 16 (03-14-24 @ 08:30) (12 - 20)  SpO2: 96% (03-14-24 @ 08:30) (93% - 98%)  Wt(kg): --  HEENT: NCAT, EOMI, PERRLA  NECK: No JVD, No carotid bruits B/L, +2 Carotid pulses B/L  PULM:  CTA B/L No W/R/R  CARD: RRR, +S1, +S2, No M/R/G  ABD: ND, +BS, NT, no masses  EXT: Warm, pedal edema yes/no, pitting/non-pitting  RECTAL: Guaiac negative/positive   NEURO: A & O x 3, no focal neurologic deficits  PULSES:	B	    R	      FEM         	                                            DP                          PT  Right		                                                  No     Bruit	    Left		                                                  No     Bruit                                10.0   4.83  )-----------( 330      ( 14 Mar 2024 05:30 )             32.3     03-14    143  |  106  |  12  ----------------------------<  158<H>  3.2<L>   |  26  |  0.63    Ca    9.3      14 Mar 2024 05:30  Phos  4.0     03-14  Mg     2.0     03-14    TPro  6.2  /  Alb  3.6  /  TBili  0.4  /  DBili  x   /  AST  18  /  ALT  17  /  AlkPhos  56  03-14    CARDIAC MARKERS ( 14 Mar 2024 05:30 )  x     / x     / 40 U/L / x     / 1.6 ng/mL          EKG:					  ASA _____				Mallampati class: III	            Anginal Class: _________  A/P:        Sedation Plan:  Moderate  Patient Is Suitable Candidate For Sedation? Yes      Risks & benefits of procedure and sedation and risks and benefits for the alternative therapy have been explained to the patient in layman’s terms including but not limited to: allergic reaction, bleeding, infection, arrhythmia, respiratory compromise, renal and vascular compromise, limb damage, MI, CVA, emergent CABG/Vascular Surgery and death. Informed consent obtained and in chart. Interventional Cardiology PA Precath Note    HPI: 74 y/o female PMH DM, severe AS, EF 60%, HTN, HLD, anemia who was admitted to [facility] with syncope. CT negative for CVA. subsequent echo showed severe AS. Transferred to [facility] under Dr. Moon for evaluation for TAVR vs BAV. Pt now presents to  for [facility] for pre-op clearance.      Anginal class 1      ALL: NKDA, NKFA. Denies shellfish/Contrast dye allergy.  SocHX: Denies EtoH/TOB/IVDU     MEDS:   acetaminophen     Tablet .. 650 milliGRAM(s) Oral every 6 hours PRN  aspirin  chewable 81 milliGRAM(s) Oral daily  atorvastatin 40 milliGRAM(s) Oral at bedtime  dextrose 5%. 1000 milliLiter(s) IV Continuous <Continuous>  dextrose 5%. 1000 milliLiter(s) IV Continuous <Continuous>  dextrose 50% Injectable 25 Gram(s) IV Push once  dextrose 50% Injectable 12.5 Gram(s) IV Push once  dextrose 50% Injectable 25 Gram(s) IV Push once  dextrose Oral Gel 15 Gram(s) Oral once PRN  glucagon  Injectable 1 milliGRAM(s) IntraMuscular once  heparin   Injectable 5000 Unit(s) SubCutaneous every 8 hours  insulin glargine Injectable (LANTUS) 14 Unit(s) SubCutaneous at bedtime  insulin lispro (ADMELOG) corrective regimen sliding scale   SubCutaneous at bedtime  insulin lispro (ADMELOG) corrective regimen sliding scale   SubCutaneous three times a day before meals  insulin lispro Injectable (ADMELOG) 4 Unit(s) SubCutaneous three times a day before meals  lactated ringers. 1000 milliLiter(s) IV Continuous <Continuous>  latanoprost 0.005% Ophthalmic Solution 1 Drop(s) Both EYES at bedtime  metoprolol tartrate 25 milliGRAM(s) Oral every 12 hours  pantoprazole    Tablet 40 milliGRAM(s) Oral before breakfast  polyethylene glycol 3350 17 Gram(s) Oral daily  potassium chloride    Tablet ER 40 milliEquivalent(s) Oral every 4 hours  senna 2 Tablet(s) Oral at bedtime  sodium chloride 0.9% lock flush 3 milliLiter(s) IV Push every 8 hours  timolol 0.5% Solution 1 Drop(s) Both EYES two times a day    T(C): 36.7 (03-14-24 @ 09:37), Max: 37.1 (03-13-24 @ 21:19)  HR: 66 (03-14-24 @ 08:30) (66 - 158)  BP: 115/53 (03-14-24 @ 08:30) (92/68 - 127/62)  RR: 16 (03-14-24 @ 08:30) (12 - 20)  SpO2: 96% (03-14-24 @ 08:30) (93% - 98%)  Wt(kg): --  HEENT: NCAT, EOMI, PERRLA  NECK: No JVD, No carotid bruits B/L, +2 Carotid pulses B/L  PULM:  CTA B/L No W/R/R  CARD: RRR, +S1, +S2, No M/R/G  ABD: ND, +BS, NT, no masses  EXT: Warm, pedal edema yes/no, pitting/non-pitting  RECTAL: Guaiac negative/positive   NEURO: A & O x 3, no focal neurologic deficits  PULSES:	B	    R	      FEM         	                                            DP                          PT  Right		                                                  No     Bruit	    Left		                                                  No     Bruit                                10.0   4.83  )-----------( 330      ( 14 Mar 2024 05:30 )             32.3     03-14    143  |  106  |  12  ----------------------------<  158<H>  3.2<L>   |  26  |  0.63    Ca    9.3      14 Mar 2024 05:30  Phos  4.0     03-14  Mg     2.0     03-14    TPro  6.2  /  Alb  3.6  /  TBili  0.4  /  DBili  x   /  AST  18  /  ALT  17  /  AlkPhos  56  03-14    CARDIAC MARKERS ( 14 Mar 2024 05:30 )  x     / x     / 40 U/L / x     / 1.6 ng/mL          EKG:					  ASA 81mg PO qd				Mallampati class: III	            Anginal Class: I  A/P:  HPI: 74 y/o female PMH DM, severe AS, EF 60%, HTN, HLD, anemia who was admitted to [facility] with syncope. CT negative for CVA. subsequent echo showed severe AS. Transferred to [facility] under Dr. Moon for evaluation for TAVR vs BAV. Pt now presents to  for [facility] for pre-op clearance.       Sedation Plan:  Moderate  Patient Is Suitable Candidate For Sedation? Yes      Risks & benefits of procedure and sedation and risks and benefits for the alternative therapy have been explained to the patient in layman’s terms including but not limited to: allergic reaction, bleeding, infection, arrhythmia, respiratory compromise, renal and vascular compromise, limb damage, MI, CVA, emergent CABG/Vascular Surgery and death. Informed consent obtained and in chart.    H/H 10.0/32.3 and platelets. Patient denies bleeding, BRBPR, melena, hematuria, hematemesis.       Pt euvolemic on exam. Cr 0.63. EF 60%.

## 2024-03-15 LAB
ANION GAP SERPL CALC-SCNC: 9 MMOL/L — SIGNIFICANT CHANGE UP (ref 5–17)
APTT BLD: 30.2 SEC — SIGNIFICANT CHANGE UP (ref 24.5–35.6)
BUN SERPL-MCNC: 13 MG/DL — SIGNIFICANT CHANGE UP (ref 7–23)
CALCIUM SERPL-MCNC: 9.2 MG/DL — SIGNIFICANT CHANGE UP (ref 8.4–10.5)
CHLORIDE SERPL-SCNC: 105 MMOL/L — SIGNIFICANT CHANGE UP (ref 96–108)
CO2 SERPL-SCNC: 26 MMOL/L — SIGNIFICANT CHANGE UP (ref 22–31)
CREAT SERPL-MCNC: 0.64 MG/DL — SIGNIFICANT CHANGE UP (ref 0.5–1.3)
EGFR: 94 ML/MIN/1.73M2 — SIGNIFICANT CHANGE UP
GLUCOSE BLDC GLUCOMTR-MCNC: 104 MG/DL — HIGH (ref 70–99)
GLUCOSE BLDC GLUCOMTR-MCNC: 143 MG/DL — HIGH (ref 70–99)
GLUCOSE BLDC GLUCOMTR-MCNC: 186 MG/DL — HIGH (ref 70–99)
GLUCOSE BLDC GLUCOMTR-MCNC: 251 MG/DL — HIGH (ref 70–99)
GLUCOSE BLDC GLUCOMTR-MCNC: 295 MG/DL — HIGH (ref 70–99)
GLUCOSE BLDC GLUCOMTR-MCNC: 445 MG/DL — HIGH (ref 70–99)
GLUCOSE SERPL-MCNC: 138 MG/DL — HIGH (ref 70–99)
HCT VFR BLD CALC: 36.3 % — SIGNIFICANT CHANGE UP (ref 34.5–45)
HGB BLD-MCNC: 10.9 G/DL — LOW (ref 11.5–15.5)
INR BLD: 0.99 — SIGNIFICANT CHANGE UP (ref 0.85–1.18)
MAGNESIUM SERPL-MCNC: 2.1 MG/DL — SIGNIFICANT CHANGE UP (ref 1.6–2.6)
MCHC RBC-ENTMCNC: 24.7 PG — LOW (ref 27–34)
MCHC RBC-ENTMCNC: 30 GM/DL — LOW (ref 32–36)
MCV RBC AUTO: 82.1 FL — SIGNIFICANT CHANGE UP (ref 80–100)
NRBC # BLD: 0 /100 WBCS — SIGNIFICANT CHANGE UP (ref 0–0)
PHOSPHATE SERPL-MCNC: 4 MG/DL — SIGNIFICANT CHANGE UP (ref 2.5–4.5)
PLATELET # BLD AUTO: 364 K/UL — SIGNIFICANT CHANGE UP (ref 150–400)
POTASSIUM SERPL-MCNC: 3.5 MMOL/L — SIGNIFICANT CHANGE UP (ref 3.5–5.3)
POTASSIUM SERPL-SCNC: 3.5 MMOL/L — SIGNIFICANT CHANGE UP (ref 3.5–5.3)
PROTHROM AB SERPL-ACNC: 11.3 SEC — SIGNIFICANT CHANGE UP (ref 9.5–13)
RBC # BLD: 4.42 M/UL — SIGNIFICANT CHANGE UP (ref 3.8–5.2)
RBC # FLD: 13.9 % — SIGNIFICANT CHANGE UP (ref 10.3–14.5)
SODIUM SERPL-SCNC: 140 MMOL/L — SIGNIFICANT CHANGE UP (ref 135–145)
WBC # BLD: 5.22 K/UL — SIGNIFICANT CHANGE UP (ref 3.8–10.5)
WBC # FLD AUTO: 5.22 K/UL — SIGNIFICANT CHANGE UP (ref 3.8–10.5)

## 2024-03-15 PROCEDURE — 99232 SBSQ HOSP IP/OBS MODERATE 35: CPT

## 2024-03-15 RX ORDER — INSULIN LISPRO 100/ML
8 VIAL (ML) SUBCUTANEOUS
Refills: 0 | Status: DISCONTINUED | OUTPATIENT
Start: 2024-03-15 | End: 2024-03-15

## 2024-03-15 RX ORDER — INSULIN GLARGINE 100 [IU]/ML
16 INJECTION, SOLUTION SUBCUTANEOUS AT BEDTIME
Refills: 0 | Status: DISCONTINUED | OUTPATIENT
Start: 2024-03-15 | End: 2024-03-15

## 2024-03-15 RX ORDER — INSULIN LISPRO 100/ML
7 VIAL (ML) SUBCUTANEOUS
Refills: 0 | Status: DISCONTINUED | OUTPATIENT
Start: 2024-03-15 | End: 2024-03-16

## 2024-03-15 RX ORDER — POTASSIUM CHLORIDE 20 MEQ
40 PACKET (EA) ORAL
Refills: 0 | Status: COMPLETED | OUTPATIENT
Start: 2024-03-15 | End: 2024-03-15

## 2024-03-15 RX ORDER — INSULIN GLARGINE 100 [IU]/ML
14 INJECTION, SOLUTION SUBCUTANEOUS AT BEDTIME
Refills: 0 | Status: DISCONTINUED | OUTPATIENT
Start: 2024-03-15 | End: 2024-03-17

## 2024-03-15 RX ADMIN — INSULIN GLARGINE 14 UNIT(S): 100 INJECTION, SOLUTION SUBCUTANEOUS at 22:43

## 2024-03-15 RX ADMIN — HEPARIN SODIUM 5000 UNIT(S): 5000 INJECTION INTRAVENOUS; SUBCUTANEOUS at 15:14

## 2024-03-15 RX ADMIN — SODIUM CHLORIDE 3 MILLILITER(S): 9 INJECTION INTRAMUSCULAR; INTRAVENOUS; SUBCUTANEOUS at 06:36

## 2024-03-15 RX ADMIN — Medication 1 DROP(S): at 18:30

## 2024-03-15 RX ADMIN — Medication 81 MILLIGRAM(S): at 11:07

## 2024-03-15 RX ADMIN — Medication 4 UNIT(S): at 08:28

## 2024-03-15 RX ADMIN — Medication 1 DROP(S): at 05:33

## 2024-03-15 RX ADMIN — Medication 8 UNIT(S): at 11:28

## 2024-03-15 RX ADMIN — ATORVASTATIN CALCIUM 40 MILLIGRAM(S): 80 TABLET, FILM COATED ORAL at 22:41

## 2024-03-15 RX ADMIN — SODIUM CHLORIDE 3 MILLILITER(S): 9 INJECTION INTRAMUSCULAR; INTRAVENOUS; SUBCUTANEOUS at 13:01

## 2024-03-15 RX ADMIN — Medication 12: at 11:28

## 2024-03-15 RX ADMIN — HEPARIN SODIUM 5000 UNIT(S): 5000 INJECTION INTRAVENOUS; SUBCUTANEOUS at 22:41

## 2024-03-15 RX ADMIN — SODIUM CHLORIDE 3 MILLILITER(S): 9 INJECTION INTRAMUSCULAR; INTRAVENOUS; SUBCUTANEOUS at 22:44

## 2024-03-15 RX ADMIN — PANTOPRAZOLE SODIUM 40 MILLIGRAM(S): 20 TABLET, DELAYED RELEASE ORAL at 05:32

## 2024-03-15 RX ADMIN — SENNA PLUS 2 TABLET(S): 8.6 TABLET ORAL at 22:42

## 2024-03-15 RX ADMIN — Medication 40 MILLIEQUIVALENT(S): at 10:40

## 2024-03-15 RX ADMIN — HEPARIN SODIUM 5000 UNIT(S): 5000 INJECTION INTRAVENOUS; SUBCUTANEOUS at 05:32

## 2024-03-15 RX ADMIN — Medication 25 MILLIGRAM(S): at 05:32

## 2024-03-15 RX ADMIN — Medication 8 UNIT(S): at 16:42

## 2024-03-15 RX ADMIN — Medication 25 MILLIGRAM(S): at 18:31

## 2024-03-15 RX ADMIN — Medication 2: at 08:27

## 2024-03-15 RX ADMIN — Medication 40 MILLIEQUIVALENT(S): at 08:49

## 2024-03-15 NOTE — PROGRESS NOTE ADULT - ASSESSMENT
74 y/o female PMH DM, severe AS, EF 60%, HTN, HLD, anemia who was admitted to [facility] with syncope. CT negative for CVA, but echo showed severe AS. Transferred to [facility] for SAVR.    Endocrine consulted for T2 diabetes management. Education deficit, but responsive to information provided.  Hyperglycemia due to excessive juice and fruit. Diet now changed to consistent carb.    A1C: 9.6 %  BUN: 13  Creatinine: 0.64  GFR: 94  Weight: 55.9  BMI: 22.5  EF: 60%

## 2024-03-15 NOTE — PROGRESS NOTE ADULT - ASSESSMENT
74 y/o female PMH DM, severe AS, EF 60%, HTN, HLD, anemia who was admitted to [facility] with syncope. CT negative for CVA. Echo showed severe AS. Transferred to [facility] under Dr. Moon for evaluation for TAVR vs BAV. Upon review of structural scans, patient was deemed an inappropriate candidate for TAVR 2/2 size of aorta. CTS consulted for evaluation for AVR. 3/14 cardiac cath pRCA 50%. Plan for OR for AVR next Tuesday.    Plan:    Neurovascular:   -Pain well controlled with current regimen. PRN's: tylenol    Cardiovascular:   preop AVR  -not a candidate for TAVR 2/2 anatomy  -continue ASA/statin/BB  -plan for AVR Tuesday  -cardiac cath with pRCA 50% stenosis  -Hemodynamically stable.   -Monitor: BP, HR, tele    Respiratory:   -Oxygenating well on room air  -Encourage continued use of IS 10x/hr and frequent ambulation    GI:  -GI PPX: continue protonix  -PO Diet  -Bowel Regimen: continue senna/miralax    Renal / :  -Continue to monitor renal function: BUN/Cr: 13/0.64  -Monitor I/O's daily     Endocrine:    hx of DM   -A1c: 9.6  -endo consulted and following  -continue lantus 16u, lispro 8u, MISS  no hx thyroid disease  -TSH: 1.530    Hematologic:  -CBC: H/H- 10.9/36.3  -Coagulation Panel.    ID:  -Temperature: afebrile  -CBC: WBC- 5.22    Prophylaxis:  -DVT prophylaxis with 5000 SubQ Heparin q8h.  -Continue with SCD's b/l while patient is at rest     Disposition:  -OR plan pending

## 2024-03-15 NOTE — PROGRESS NOTE ADULT - SUBJECTIVE AND OBJECTIVE BOX
SUBJECTIVE / INTERVAL HPI: Patient was seen and examined this morning. Cardiac cath yesterday - 50% stenosis of pRCA. Planned for AVR possibly Tuesday. Not started on consistent carb diet after cath. Had dinner in cath lab and then had multiple fruits and juice, which explains the 400 at bedtime. Pre lunch also in 400s from juice and fruit. Diet now changed to consistent carb, and patient advised to skip juice and regular soda.    CAPILLARY BLOOD GLUCOSE & INSULIN RECEIVED  301 mg/dL (03-14 @ 16:36) - Lispro 4+8  419 mg/dL (03-14 @ 22:04) - Lantus 14+8  251 mg/dL (03-15 @ 00:34)  138 mg/dL (03-15 @ 05:30)  186 mg/dL (03-15 @ 08:19) - Lispro 4+2  445 mg/dL (03-15 @ 11:17) - Lispro 8+12    REVIEW OF SYSTEMS  Constitutional:  Negative fever, chills or loss of appetite.  Eyes:  Negative blurry vision or double vision.  Cardiovascular:  Negative for chest pain or palpitations.  Respiratory:  Negative for cough, wheezing, or shortness of breath.    Gastrointestinal:  Negative for nausea, vomiting, diarrhea, constipation, or abdominal pain.  Genitourinary:  Negative frequency, urgency or dysuria.  Neurologic:  No headache, confusion, dizziness, lightheadedness.    PHYSICAL EXAM  Vital Signs Last 24 Hrs  T(C): 35.9 (15 Mar 2024 08:30), Max: 36.8 (14 Mar 2024 17:10)  T(F): 96.7 (15 Mar 2024 08:30), Max: 98.3 (14 Mar 2024 17:10)  HR: 58 (15 Mar 2024 13:00) (58 - 80)  BP: 126/72 (15 Mar 2024 13:00) (98/56 - 133/60)  BP(mean): 91 (15 Mar 2024 13:00) (72 - 91)  RR: 18 (15 Mar 2024 13:00) (16 - 18)  SpO2: 98% (15 Mar 2024 13:00) (95% - 99%)    Parameters below as of 15 Mar 2024 13:00  Patient On (Oxygen Delivery Method): room air      Constitutional: Awake, alert, in no acute distress.   HEENT: Normocephalic, atraumatic, GEORGETTE, no proptosis or lid retraction.   Neck: supple, no acanthosis, no thyromegaly or palpable thyroid nodules.  Respiratory: Lungs clear to ausculation bilaterally.   Cardiovascular: regular rhythm, normal S1 and S2, + murmur  GI: soft, non-tender, non-distended, bowel sounds present, no masses appreciated.  Extremities: No lower extremity edema, peripheral pulses present.   Skin: no rashes.   Psychiatric: AAO x 3. Normal affect/mood.     LABS  CBC - WBC/HGB/HTC/PLT: 5.22/10.9/36.3/364 (03-15-24)  BMP - Na/K/Cl/Bicarb/BUN/Cr/Gluc/AG/eGFR: 140/3.5/105/26/13/0.64/138/9/94 (03-15-24)  Ca - 9.2 (03-15-24)  Phos - 4.0 (03-15-24)  Mg - 2.1 (03-15-24)  LFT - Alb/Tprot/Tbili/Dbili/AlkPhos/ALT/AST: 3.6/--/0.4/--/56/17/18 (03-14-24)  PT/aPTT/INR: 11.3/30.2/0.99 (03-15-24)   Thyroid Stimulating Hormone, Serum: 1.530 (03-12-24)      MEDICATIONS  MEDICATIONS  (STANDING):  aspirin  chewable 81 milliGRAM(s) Oral daily  atorvastatin 40 milliGRAM(s) Oral at bedtime  chlorhexidine 4% Liquid 1 Application(s) Topical once  dextrose 5%. 1000 milliLiter(s) (100 mL/Hr) IV Continuous <Continuous>  dextrose 5%. 1000 milliLiter(s) (50 mL/Hr) IV Continuous <Continuous>  dextrose 50% Injectable 25 Gram(s) IV Push once  dextrose 50% Injectable 12.5 Gram(s) IV Push once  dextrose 50% Injectable 25 Gram(s) IV Push once  glucagon  Injectable 1 milliGRAM(s) IntraMuscular once  heparin   Injectable 5000 Unit(s) SubCutaneous every 8 hours  insulin glargine Injectable (LANTUS) 16 Unit(s) SubCutaneous at bedtime  insulin lispro (ADMELOG) corrective regimen sliding scale   SubCutaneous three times a day before meals  insulin lispro (ADMELOG) corrective regimen sliding scale   SubCutaneous at bedtime  insulin lispro Injectable (ADMELOG) 8 Unit(s) SubCutaneous three times a day before meals  lactated ringers. 1000 milliLiter(s) (50 mL/Hr) IV Continuous <Continuous>  latanoprost 0.005% Ophthalmic Solution 1 Drop(s) Both EYES at bedtime  metoprolol tartrate 25 milliGRAM(s) Oral every 12 hours  pantoprazole    Tablet 40 milliGRAM(s) Oral before breakfast  polyethylene glycol 3350 17 Gram(s) Oral daily  senna 2 Tablet(s) Oral at bedtime  sodium chloride 0.9% lock flush 3 milliLiter(s) IV Push every 8 hours  sodium chloride 0.9%. 1000 milliLiter(s) (75 mL/Hr) IV Continuous <Continuous>  timolol 0.5% Solution 1 Drop(s) Both EYES two times a day    MEDICATIONS  (PRN):  acetaminophen     Tablet .. 650 milliGRAM(s) Oral every 6 hours PRN Mild Pain (1 - 3)  bisacodyl 5 milliGRAM(s) Oral at bedtime PRN Constipation  dextrose Oral Gel 15 Gram(s) Oral once PRN Blood Glucose LESS THAN 70 milliGRAM(s)/deciliter

## 2024-03-15 NOTE — PROGRESS NOTE ADULT - PROBLEM SELECTOR PLAN 1
Type 2 diabetes mellitus with hyperglycemia  - Please give lantus 16 units at bedtime.   - Give lispro 8 units before each meal.  - Continue lispro moderate dose sliding scale before meals and at bedtime.  - Patient's fingerstick glucose goal is 100-180 mg/dL.    - Consistent carb diet.  - For discharge, patient can ***.    - Patient can follow up at discharge with [facility] by calling [phone] to make an appointment.      Primary team updated. Type 2 diabetes mellitus with hyperglycemia  - Please give lantus 14 units at bedtime.   - Give lispro 7 units before each meal.  - Continue lispro moderate dose sliding scale before meals and at bedtime.  - Patient's fingerstick glucose goal is 100-180 mg/dL.    - Consistent carb diet.  - For discharge, patient can ***.    - Patient can follow up at discharge with [facility] by calling [phone] to make an appointment.      Primary team updated.

## 2024-03-15 NOTE — PROGRESS NOTE ADULT - SUBJECTIVE AND OBJECTIVE BOX
Surgeon: Dr. Reno    OPERATION & DATE: pre-op AVR    SUBJECTIVE ASSESSMENT:  73yoM seen and examined. Awaiting surgery next week. Satting well on room air, ambulating independently. Denies lightheadedness, headache, CP, palpitations, SOB, abdominal pain, nausea, vomiting, fever, chills.    VITAL SIGNS:  Vital Signs Last 24 Hrs  T(C): 36 (15 Mar 2024 13:57), Max: 36.8 (14 Mar 2024 17:10)  T(F): 96.8 (15 Mar 2024 13:57), Max: 98.3 (14 Mar 2024 17:10)  HR: 58 (15 Mar 2024 13:00) (58 - 80)  BP: 126/72 (15 Mar 2024 13:00) (98/56 - 133/60)  BP(mean): 91 (15 Mar 2024 13:00) (72 - 91)  RR: 18 (15 Mar 2024 13:00) (16 - 18)  SpO2: 98% (15 Mar 2024 13:00) (95% - 99%)    Parameters below as of 15 Mar 2024 13:00  Patient On (Oxygen Delivery Method): room air      I&O's Detail    14 Mar 2024 07:01  -  15 Mar 2024 07:00  --------------------------------------------------------  IN:    Oral Fluid: 240 mL  Total IN: 240 mL    OUT:    Voided (mL): 300 mL  Total OUT: 300 mL    Total NET: -60 mL      15 Mar 2024 07:01  -  15 Mar 2024 13:58  --------------------------------------------------------  IN:  Total IN: 0 mL    OUT:    Voided (mL): 200 mL  Total OUT: 200 mL    Total NET: -200 mL      PHYSICAL EXAM:  General: NAD, sitting comfortably in chair, conversing appropriately  Neurological: alert and oriented, UE and LE strength equal b/l, facial symmetry present  Cardiovascular: RRR, Clear S1 and S2, +systolic murmur  Respiratory: chest expansion symmetrical, CTA b/l, no wheezing noted  Gastrointestinal: +BS, soft, NT, ND  Extremities: moving spontaneously, no calf tenderness or edema.  Vascular: warm, well perfused.   Incisions: none    LABS:                        10.9   5.22  )-----------( 364      ( 15 Mar 2024 05:30 )             36.3     PT/INR - ( 15 Mar 2024 05:30 )   PT: 11.3 sec;   INR: 0.99          PTT - ( 15 Mar 2024 05:30 )  PTT:30.2 sec  03-15    140  |  105  |  13  ----------------------------<  138<H>  3.5   |  26  |  0.64    Ca    9.2      15 Mar 2024 05:30  Phos  4.0     03-15  Mg     2.1     03-15    TPro  6.2  /  Alb  3.6  /  TBili  0.4  /  DBili  x   /  AST  18  /  ALT  17  /  AlkPhos  56  03-14    Urinalysis Basic - ( 15 Mar 2024 05:30 )    Color: x / Appearance: x / SG: x / pH: x  Gluc: 138 mg/dL / Ketone: x  / Bili: x / Urobili: x   Blood: x / Protein: x / Nitrite: x   Leuk Esterase: x / RBC: x / WBC x   Sq Epi: x / Non Sq Epi: x / Bacteria: x      MEDICATIONS  (STANDING):  aspirin  chewable 81 milliGRAM(s) Oral daily  atorvastatin 40 milliGRAM(s) Oral at bedtime  chlorhexidine 4% Liquid 1 Application(s) Topical once  dextrose 5%. 1000 milliLiter(s) (50 mL/Hr) IV Continuous <Continuous>  dextrose 5%. 1000 milliLiter(s) (100 mL/Hr) IV Continuous <Continuous>  dextrose 50% Injectable 12.5 Gram(s) IV Push once  dextrose 50% Injectable 25 Gram(s) IV Push once  dextrose 50% Injectable 25 Gram(s) IV Push once  glucagon  Injectable 1 milliGRAM(s) IntraMuscular once  heparin   Injectable 5000 Unit(s) SubCutaneous every 8 hours  insulin glargine Injectable (LANTUS) 16 Unit(s) SubCutaneous at bedtime  insulin lispro (ADMELOG) corrective regimen sliding scale   SubCutaneous three times a day before meals  insulin lispro (ADMELOG) corrective regimen sliding scale   SubCutaneous at bedtime  insulin lispro Injectable (ADMELOG) 8 Unit(s) SubCutaneous three times a day before meals  lactated ringers. 1000 milliLiter(s) (50 mL/Hr) IV Continuous <Continuous>  latanoprost 0.005% Ophthalmic Solution 1 Drop(s) Both EYES at bedtime  metoprolol tartrate 25 milliGRAM(s) Oral every 12 hours  pantoprazole    Tablet 40 milliGRAM(s) Oral before breakfast  polyethylene glycol 3350 17 Gram(s) Oral daily  senna 2 Tablet(s) Oral at bedtime  sodium chloride 0.9% lock flush 3 milliLiter(s) IV Push every 8 hours  sodium chloride 0.9%. 1000 milliLiter(s) (75 mL/Hr) IV Continuous <Continuous>  timolol 0.5% Solution 1 Drop(s) Both EYES two times a day    MEDICATIONS  (PRN):  acetaminophen     Tablet .. 650 milliGRAM(s) Oral every 6 hours PRN Mild Pain (1 - 3)  bisacodyl 5 milliGRAM(s) Oral at bedtime PRN Constipation  dextrose Oral Gel 15 Gram(s) Oral once PRN Blood Glucose LESS THAN 70 milliGRAM(s)/deciliter    RADIOLOGY & ADDITIONAL TESTS:

## 2024-03-16 LAB
ANION GAP SERPL CALC-SCNC: 10 MMOL/L — SIGNIFICANT CHANGE UP (ref 5–17)
BUN SERPL-MCNC: 10 MG/DL — SIGNIFICANT CHANGE UP (ref 7–23)
CALCIUM SERPL-MCNC: 9.4 MG/DL — SIGNIFICANT CHANGE UP (ref 8.4–10.5)
CHLORIDE SERPL-SCNC: 107 MMOL/L — SIGNIFICANT CHANGE UP (ref 96–108)
CO2 SERPL-SCNC: 24 MMOL/L — SIGNIFICANT CHANGE UP (ref 22–31)
CREAT SERPL-MCNC: 0.61 MG/DL — SIGNIFICANT CHANGE UP (ref 0.5–1.3)
EGFR: 96 ML/MIN/1.73M2 — SIGNIFICANT CHANGE UP
GLUCOSE BLDC GLUCOMTR-MCNC: 190 MG/DL — HIGH (ref 70–99)
GLUCOSE BLDC GLUCOMTR-MCNC: 305 MG/DL — HIGH (ref 70–99)
GLUCOSE BLDC GLUCOMTR-MCNC: 80 MG/DL — SIGNIFICANT CHANGE UP (ref 70–99)
GLUCOSE BLDC GLUCOMTR-MCNC: 99 MG/DL — SIGNIFICANT CHANGE UP (ref 70–99)
GLUCOSE SERPL-MCNC: 86 MG/DL — SIGNIFICANT CHANGE UP (ref 70–99)
HCT VFR BLD CALC: 35 % — SIGNIFICANT CHANGE UP (ref 34.5–45)
HGB BLD-MCNC: 10.4 G/DL — LOW (ref 11.5–15.5)
MAGNESIUM SERPL-MCNC: 1.9 MG/DL — SIGNIFICANT CHANGE UP (ref 1.6–2.6)
MCHC RBC-ENTMCNC: 24.6 PG — LOW (ref 27–34)
MCHC RBC-ENTMCNC: 29.7 GM/DL — LOW (ref 32–36)
MCV RBC AUTO: 82.7 FL — SIGNIFICANT CHANGE UP (ref 80–100)
NRBC # BLD: 0 /100 WBCS — SIGNIFICANT CHANGE UP (ref 0–0)
PLATELET # BLD AUTO: 332 K/UL — SIGNIFICANT CHANGE UP (ref 150–400)
POTASSIUM SERPL-MCNC: 3.8 MMOL/L — SIGNIFICANT CHANGE UP (ref 3.5–5.3)
POTASSIUM SERPL-SCNC: 3.8 MMOL/L — SIGNIFICANT CHANGE UP (ref 3.5–5.3)
RBC # BLD: 4.23 M/UL — SIGNIFICANT CHANGE UP (ref 3.8–5.2)
RBC # FLD: 13.9 % — SIGNIFICANT CHANGE UP (ref 10.3–14.5)
SODIUM SERPL-SCNC: 141 MMOL/L — SIGNIFICANT CHANGE UP (ref 135–145)
WBC # BLD: 6.94 K/UL — SIGNIFICANT CHANGE UP (ref 3.8–10.5)
WBC # FLD AUTO: 6.94 K/UL — SIGNIFICANT CHANGE UP (ref 3.8–10.5)

## 2024-03-16 PROCEDURE — 99232 SBSQ HOSP IP/OBS MODERATE 35: CPT

## 2024-03-16 RX ORDER — MAGNESIUM OXIDE 400 MG ORAL TABLET 241.3 MG
800 TABLET ORAL ONCE
Refills: 0 | Status: COMPLETED | OUTPATIENT
Start: 2024-03-16 | End: 2024-03-16

## 2024-03-16 RX ORDER — POTASSIUM CHLORIDE 20 MEQ
20 PACKET (EA) ORAL ONCE
Refills: 0 | Status: COMPLETED | OUTPATIENT
Start: 2024-03-16 | End: 2024-03-16

## 2024-03-16 RX ORDER — INSULIN LISPRO 100/ML
12 VIAL (ML) SUBCUTANEOUS
Refills: 0 | Status: DISCONTINUED | OUTPATIENT
Start: 2024-03-16 | End: 2024-03-17

## 2024-03-16 RX ADMIN — Medication 81 MILLIGRAM(S): at 11:36

## 2024-03-16 RX ADMIN — Medication 2: at 17:22

## 2024-03-16 RX ADMIN — ATORVASTATIN CALCIUM 40 MILLIGRAM(S): 80 TABLET, FILM COATED ORAL at 21:48

## 2024-03-16 RX ADMIN — Medication 8: at 12:29

## 2024-03-16 RX ADMIN — SODIUM CHLORIDE 3 MILLILITER(S): 9 INJECTION INTRAMUSCULAR; INTRAVENOUS; SUBCUTANEOUS at 04:17

## 2024-03-16 RX ADMIN — HEPARIN SODIUM 5000 UNIT(S): 5000 INJECTION INTRAVENOUS; SUBCUTANEOUS at 13:35

## 2024-03-16 RX ADMIN — HEPARIN SODIUM 5000 UNIT(S): 5000 INJECTION INTRAVENOUS; SUBCUTANEOUS at 06:08

## 2024-03-16 RX ADMIN — INSULIN GLARGINE 14 UNIT(S): 100 INJECTION, SOLUTION SUBCUTANEOUS at 21:48

## 2024-03-16 RX ADMIN — HEPARIN SODIUM 5000 UNIT(S): 5000 INJECTION INTRAVENOUS; SUBCUTANEOUS at 21:48

## 2024-03-16 RX ADMIN — Medication 25 MILLIGRAM(S): at 06:11

## 2024-03-16 RX ADMIN — Medication 20 MILLIEQUIVALENT(S): at 10:24

## 2024-03-16 RX ADMIN — SODIUM CHLORIDE 3 MILLILITER(S): 9 INJECTION INTRAMUSCULAR; INTRAVENOUS; SUBCUTANEOUS at 13:33

## 2024-03-16 RX ADMIN — Medication 7 UNIT(S): at 12:30

## 2024-03-16 RX ADMIN — Medication 12 UNIT(S): at 17:23

## 2024-03-16 RX ADMIN — MAGNESIUM OXIDE 400 MG ORAL TABLET 800 MILLIGRAM(S): 241.3 TABLET ORAL at 10:25

## 2024-03-16 RX ADMIN — Medication 25 MILLIGRAM(S): at 17:24

## 2024-03-16 RX ADMIN — Medication 1 DROP(S): at 19:35

## 2024-03-16 RX ADMIN — PANTOPRAZOLE SODIUM 40 MILLIGRAM(S): 20 TABLET, DELAYED RELEASE ORAL at 04:39

## 2024-03-16 RX ADMIN — SODIUM CHLORIDE 3 MILLILITER(S): 9 INJECTION INTRAMUSCULAR; INTRAVENOUS; SUBCUTANEOUS at 04:39

## 2024-03-16 RX ADMIN — Medication 7 UNIT(S): at 06:59

## 2024-03-16 NOTE — PROGRESS NOTE ADULT - ASSESSMENT
Patient is a 74 yo woman with uncontrolled T2DM (A1c of 9.6%) and cardiac comorbidities including, severe AS, EF 60%, HTN, HLD, anemia who was admitted to [facility] with syncope. CT negative for CVA, but echo showed severe AS. Transferred to [facility] for SAVR.

## 2024-03-16 NOTE — PROGRESS NOTE ADULT - ASSESSMENT
72 y/o female PMH DM, severe AS, EF 60%, HTN, HLD, anemia who was admitted to [facility] with syncope. CT negative for CVA. Echo showed severe AS. Transferred to [facility] under Dr. Moon for evaluation for TAVR vs BAV. Upon review of structural scans, patient was deemed an inappropriate candidate for TAVR 2/2 size of aorta. CTS consulted for evaluation for AVR. 3/14 cardiac cath pRCA 50%. Plan for OR for AVR Tuesday.    Neuro:   Pain well controlled with PRN APAP  No delirium, no focal deficits     Cardiac:   Pre-Op for Surgical AVR  - not candidate for TAVR 2/2 anatomy   - cardiac cath with pRCA 50%   - continue metoprolol 25 mg q12   - continue statin 40mg, ASA 81 mg   Vital signs stable over last 24 hours   - HR: 58-66  - BP:113-165/50-72  HTN:   - metoprolol 12.5 mg BID  - holding home amlodipine-benzepril     Pulm:   Saturating well on room air   Encouraging IS   No AM CXR    GI:   Tolerating diet well   GI PPx: Protonix  Continue with bowel regimen     Renal:   Monitor I/Os   BUN/Cr stable @ 10/0.61    Heme:   H&H stable @ 10.4/35.0  DVT prophylaxis: SCDs and SQH 5000U     ID:   Afebrile, WBC stable @ 6.4   Monitor fever curve     MSK:   Encourage ambulation   PT/OT     Endocrine:   Hx of DM   - A1C: 9.6   - endocrine consulted   - placed on Lantus 14, lispro 12  No Hx of Thyroid Disease  - TSH: 1.530  Monitor blood glucose levels     Dispo:  OR Tuesday

## 2024-03-16 NOTE — PROGRESS NOTE ADULT - SUBJECTIVE AND OBJECTIVE BOX
Endocrine following for diabetes management. She is awake, states appetite is very robust and is without symptoms at this time.  Glucose in AM is tightly controlled.    MEDICATIONS  (STANDING):  aspirin  chewable 81 milliGRAM(s) Oral daily  atorvastatin 40 milliGRAM(s) Oral at bedtime  chlorhexidine 4% Liquid 1 Application(s) Topical once  dextrose 5%. 1000 milliLiter(s) (100 mL/Hr) IV Continuous <Continuous>  dextrose 5%. 1000 milliLiter(s) (50 mL/Hr) IV Continuous <Continuous>  dextrose 50% Injectable 25 Gram(s) IV Push once  dextrose 50% Injectable 25 Gram(s) IV Push once  dextrose 50% Injectable 12.5 Gram(s) IV Push once  glucagon  Injectable 1 milliGRAM(s) IntraMuscular once  heparin   Injectable 5000 Unit(s) SubCutaneous every 8 hours  insulin glargine Injectable (LANTUS) 14 Unit(s) SubCutaneous at bedtime  insulin lispro (ADMELOG) corrective regimen sliding scale   SubCutaneous at bedtime  insulin lispro (ADMELOG) corrective regimen sliding scale   SubCutaneous three times a day before meals  insulin lispro Injectable (ADMELOG) 7 Unit(s) SubCutaneous three times a day before meals  latanoprost 0.005% Ophthalmic Solution 1 Drop(s) Both EYES at bedtime  magnesium oxide 800 milliGRAM(s) Oral once  metoprolol tartrate 25 milliGRAM(s) Oral every 12 hours  pantoprazole    Tablet 40 milliGRAM(s) Oral before breakfast  polyethylene glycol 3350 17 Gram(s) Oral daily  potassium chloride    Tablet ER 20 milliEquivalent(s) Oral once  senna 2 Tablet(s) Oral at bedtime  sodium chloride 0.9% lock flush 3 milliLiter(s) IV Push every 8 hours  timolol 0.5% Solution 1 Drop(s) Both EYES two times a day    MEDICATIONS  (PRN):  acetaminophen     Tablet .. 650 milliGRAM(s) Oral every 6 hours PRN Mild Pain (1 - 3)  bisacodyl 5 milliGRAM(s) Oral at bedtime PRN Constipation  dextrose Oral Gel 15 Gram(s) Oral once PRN Blood Glucose LESS THAN 70 milliGRAM(s)/deciliter      Allergies  No Known Allergies    Review of Systems:  Constitutional: No fever  Eyes: No blurry vision  Cardiovascular: No chest pain, palpitations  Respiratory: No SOB, no cough  GI: No nausea, vomiting, abdominal pain  ALL OTHER SYSTEMS REVIEWED AND NEGATIVE    PHYSICAL EXAM:  VITALS: T(C): 36 (03-16-24 @ 09:10)  T(F): 96.8 (03-16-24 @ 09:10), Max: 97.6 (03-16-24 @ 01:35)  HR: 66 (03-16-24 @ 06:07) (58 - 66)  BP: 163/72 (03-16-24 @ 06:07) (95/50 - 163/72)  RR:  (18 - 20)  SpO2:  (94% - 98%)  Wt(kg): --  GENERAL: NAD, well-groomed, well-developed  EYES: No proptosis, no lid lag, anicteric  HEENT:  Atraumatic, Normocephalic, moist mucous membranes; adentulous  RESPIRATORY: Respirations are even and unlabored  CARDIOVASCULAR: Regular rate   GI: Soft, nontender  PSYCH: Alert and oriented x 3, reactive affect, normal mood    POCT Blood Glucose.: 99 mg/dL (03-16-24 @ 06:35) Lispro 7  POCT Blood Glucose.: 143 mg/dL (03-15-24 @ 21:49)  Lantus 14  POCT Blood Glucose.: 104 mg/dL (03-15-24 @ 16:39)  Lispro 8  POCT Blood Glucose.: 295 mg/dL (03-15-24 @ 13:54)    POCT Blood Glucose.: 445 mg/dL (03-15-24 @ 11:17)  Lispro 8+12  POCT Blood Glucose.: 186 mg/dL (03-15-24 @ 08:19)  Lispro 4+2  POCT Blood Glucose.: 251 mg/dL (03-15-24 @ 00:34)  POCT Blood Glucose.: 419 mg/dL (03-14-24 @ 22:04)  POCT Blood Glucose.: 301 mg/dL (03-14-24 @ 16:36)  POCT Blood Glucose.: 119 mg/dL (03-14-24 @ 14:11)  POCT Blood Glucose.: 121 mg/dL (03-14-24 @ 11:23)  POCT Blood Glucose.: 245 mg/dL (03-14-24 @ 07:07)  POCT Blood Glucose.: 303 mg/dL (03-13-24 @ 21:29)  POCT Blood Glucose.: 202 mg/dL (03-13-24 @ 17:27)  POCT Blood Glucose.: 206 mg/dL (03-13-24 @ 11:48)      03-16    141  |  107  |  10  ----------------------------<  86  3.8   |  24  |  0.61    eGFR: 96    Ca    9.4      03-16  Mg     1.9     03-16  Phos  4.0     03-15    TPro  6.2  /  Alb  3.6  /  TBili  0.4  /  DBili  x   /  AST  18  /  ALT  17  /  AlkPhos  56  03-14    Thyroid Function Tests:  03-12 @ 22:12 TSH 1.530

## 2024-03-16 NOTE — PROGRESS NOTE ADULT - PROBLEM SELECTOR PLAN 1
-patient's glucose levels are significantly improved and running a bit tight. AM serum glucose was 86  -she reports good appetite and eating all foods. Admits that at home she eats "everything." By that, she does not really monitor carb/sugar intake.  Diet is reportedly liberal  -for now, decrease Lantus to 12 units at bedtime  -continue admelog and sliding scale  -goal glucose 100-180  -hypoglycemia protocol  -continue consistent carbohydrate diet

## 2024-03-16 NOTE — PROGRESS NOTE ADULT - SUBJECTIVE AND OBJECTIVE BOX
Patient discussed on morning rounds with Dr. Ryder       Pre-Op for AVR on Tuesday with Dr. Rhoades     SUBJECTIVE ASSESSMENT:  73y Female with no acute complaints. Reports she has been ambulating well, voiding on her own. No active chest pain or shortness of breath. Denies any fevers, chills, headache, lightheadedness, dizziness, chest pain, shortness of breath, abdominal pain, nausea, vomiting, changes in bowel or bladder, paresthesias, or any other acute complaint.        Vital Signs Last 24 Hrs  T(C): 36.1 (16 Mar 2024 13:40), Max: 36.4 (16 Mar 2024 01:35)  T(F): 96.9 (16 Mar 2024 13:40), Max: 97.6 (16 Mar 2024 01:35)  HR: 76 (16 Mar 2024 13:28) (58 - 84)  BP: 117/56 (16 Mar 2024 13:28) (95/50 - 163/72)  BP(mean): 81 (16 Mar 2024 13:28) (67 - 104)  RR: 12 (16 Mar 2024 13:28) (12 - 20)  SpO2: 95% (16 Mar 2024 13:28) (94% - 100%)    Parameters below as of 16 Mar 2024 13:28  Patient On (Oxygen Delivery Method): room air      I&O's Detail    15 Mar 2024 07:01  -  16 Mar 2024 07:00  --------------------------------------------------------  IN:  Total IN: 0 mL    OUT:    Voided (mL): 600 mL  Total OUT: 600 mL    Total NET: -600 mL          CHEST TUBE:  NO   MOHSEN DRAIN:  No.  EPICARDIAL WIRES: No.  TIE DOWNS: No.  TRENT: No.    PHYSICAL EXAM:    General: Sitting in bed comfortably in NAD  Neuro: A&Ox3, no focal deficits   HEENT: NCAT, EOMI   Cardiac: Regular rate and rhythm, normal S1 and S2. + murmur    Pulm: Breathing comfortably on RA. No signs of respiratory distress. Lungs are CTA b/l without wheezes, rales, or rhonchi   Abdomen: Soft, non-distended, non-tender. + bowel sounds   : No trent  Extremities: Warm and well perfused, no peripheral edema, distal pulses 2+. No calf tenderness. SCDs and TEDs in place  MSK: Full AROM   Wound: Groins without erythema     LABS:                        10.4   6.94  )-----------( 332      ( 16 Mar 2024 05:30 )             35.0     PT/INR - ( 15 Mar 2024 05:30 )   PT: 11.3 sec;   INR: 0.99          PTT - ( 15 Mar 2024 05:30 )  PTT:30.2 sec    03-16    141  |  107  |  10  ----------------------------<  86  3.8   |  24  |  0.61    Ca    9.4      16 Mar 2024 05:30  Phos  4.0     03-15  Mg     1.9     03-16        Urinalysis Basic - ( 16 Mar 2024 05:30 )    Color: x / Appearance: x / SG: x / pH: x  Gluc: 86 mg/dL / Ketone: x  / Bili: x / Urobili: x   Blood: x / Protein: x / Nitrite: x   Leuk Esterase: x / RBC: x / WBC x   Sq Epi: x / Non Sq Epi: x / Bacteria: x        MEDICATIONS  (STANDING):  aspirin  chewable 81 milliGRAM(s) Oral daily  atorvastatin 40 milliGRAM(s) Oral at bedtime  chlorhexidine 4% Liquid 1 Application(s) Topical once  dextrose 5%. 1000 milliLiter(s) (100 mL/Hr) IV Continuous <Continuous>  dextrose 5%. 1000 milliLiter(s) (50 mL/Hr) IV Continuous <Continuous>  dextrose 50% Injectable 25 Gram(s) IV Push once  dextrose 50% Injectable 12.5 Gram(s) IV Push once  dextrose 50% Injectable 25 Gram(s) IV Push once  glucagon  Injectable 1 milliGRAM(s) IntraMuscular once  heparin   Injectable 5000 Unit(s) SubCutaneous every 8 hours  insulin glargine Injectable (LANTUS) 14 Unit(s) SubCutaneous at bedtime  insulin lispro (ADMELOG) corrective regimen sliding scale   SubCutaneous at bedtime  insulin lispro (ADMELOG) corrective regimen sliding scale   SubCutaneous three times a day before meals  insulin lispro Injectable (ADMELOG) 12 Unit(s) SubCutaneous three times a day before meals  latanoprost 0.005% Ophthalmic Solution 1 Drop(s) Both EYES at bedtime  metoprolol tartrate 25 milliGRAM(s) Oral every 12 hours  pantoprazole    Tablet 40 milliGRAM(s) Oral before breakfast  polyethylene glycol 3350 17 Gram(s) Oral daily  senna 2 Tablet(s) Oral at bedtime  sodium chloride 0.9% lock flush 3 milliLiter(s) IV Push every 8 hours  timolol 0.5% Solution 1 Drop(s) Both EYES two times a day    MEDICATIONS  (PRN):  acetaminophen     Tablet .. 650 milliGRAM(s) Oral every 6 hours PRN Mild Pain (1 - 3)  bisacodyl 5 milliGRAM(s) Oral at bedtime PRN Constipation  dextrose Oral Gel 15 Gram(s) Oral once PRN Blood Glucose LESS THAN 70 milliGRAM(s)/deciliter        RADIOLOGY & ADDITIONAL TESTS:

## 2024-03-17 LAB
ALBUMIN SERPL ELPH-MCNC: 3.8 G/DL — SIGNIFICANT CHANGE UP (ref 3.3–5)
ALP SERPL-CCNC: 58 U/L — SIGNIFICANT CHANGE UP (ref 40–120)
ALT FLD-CCNC: 16 U/L — SIGNIFICANT CHANGE UP (ref 10–45)
ANION GAP SERPL CALC-SCNC: 12 MMOL/L — SIGNIFICANT CHANGE UP (ref 5–17)
APTT BLD: 47.1 SEC — HIGH (ref 24.5–35.6)
AST SERPL-CCNC: 18 U/L — SIGNIFICANT CHANGE UP (ref 10–40)
BILIRUB SERPL-MCNC: 0.4 MG/DL — SIGNIFICANT CHANGE UP (ref 0.2–1.2)
BUN SERPL-MCNC: 11 MG/DL — SIGNIFICANT CHANGE UP (ref 7–23)
CALCIUM SERPL-MCNC: 10 MG/DL — SIGNIFICANT CHANGE UP (ref 8.4–10.5)
CHLORIDE SERPL-SCNC: 106 MMOL/L — SIGNIFICANT CHANGE UP (ref 96–108)
CO2 SERPL-SCNC: 25 MMOL/L — SIGNIFICANT CHANGE UP (ref 22–31)
CREAT SERPL-MCNC: 0.72 MG/DL — SIGNIFICANT CHANGE UP (ref 0.5–1.3)
EGFR: 89 ML/MIN/1.73M2 — SIGNIFICANT CHANGE UP
GLUCOSE BLDC GLUCOMTR-MCNC: 101 MG/DL — HIGH (ref 70–99)
GLUCOSE BLDC GLUCOMTR-MCNC: 163 MG/DL — HIGH (ref 70–99)
GLUCOSE BLDC GLUCOMTR-MCNC: 307 MG/DL — HIGH (ref 70–99)
GLUCOSE BLDC GLUCOMTR-MCNC: 320 MG/DL — HIGH (ref 70–99)
GLUCOSE SERPL-MCNC: 100 MG/DL — HIGH (ref 70–99)
HCT VFR BLD CALC: 34.8 % — SIGNIFICANT CHANGE UP (ref 34.5–45)
HGB BLD-MCNC: 10.7 G/DL — LOW (ref 11.5–15.5)
INR BLD: 1.02 — SIGNIFICANT CHANGE UP (ref 0.85–1.18)
MAGNESIUM SERPL-MCNC: 2.1 MG/DL — SIGNIFICANT CHANGE UP (ref 1.6–2.6)
MCHC RBC-ENTMCNC: 24.8 PG — LOW (ref 27–34)
MCHC RBC-ENTMCNC: 30.7 GM/DL — LOW (ref 32–36)
MCV RBC AUTO: 80.6 FL — SIGNIFICANT CHANGE UP (ref 80–100)
NRBC # BLD: 0 /100 WBCS — SIGNIFICANT CHANGE UP (ref 0–0)
PHOSPHATE SERPL-MCNC: 4.5 MG/DL — SIGNIFICANT CHANGE UP (ref 2.5–4.5)
PLATELET # BLD AUTO: 359 K/UL — SIGNIFICANT CHANGE UP (ref 150–400)
POTASSIUM SERPL-MCNC: 4.4 MMOL/L — SIGNIFICANT CHANGE UP (ref 3.5–5.3)
POTASSIUM SERPL-SCNC: 4.4 MMOL/L — SIGNIFICANT CHANGE UP (ref 3.5–5.3)
PROT SERPL-MCNC: 6.3 G/DL — SIGNIFICANT CHANGE UP (ref 6–8.3)
PROTHROM AB SERPL-ACNC: 11.6 SEC — SIGNIFICANT CHANGE UP (ref 9.5–13)
RBC # BLD: 4.32 M/UL — SIGNIFICANT CHANGE UP (ref 3.8–5.2)
RBC # FLD: 13.9 % — SIGNIFICANT CHANGE UP (ref 10.3–14.5)
SODIUM SERPL-SCNC: 143 MMOL/L — SIGNIFICANT CHANGE UP (ref 135–145)
WBC # BLD: 6.7 K/UL — SIGNIFICANT CHANGE UP (ref 3.8–10.5)
WBC # FLD AUTO: 6.7 K/UL — SIGNIFICANT CHANGE UP (ref 3.8–10.5)

## 2024-03-17 PROCEDURE — 99232 SBSQ HOSP IP/OBS MODERATE 35: CPT

## 2024-03-17 PROCEDURE — 71045 X-RAY EXAM CHEST 1 VIEW: CPT | Mod: 26

## 2024-03-17 RX ORDER — DEXTROSE 50 % IN WATER 50 %
15 SYRINGE (ML) INTRAVENOUS ONCE
Refills: 0 | Status: DISCONTINUED | OUTPATIENT
Start: 2024-03-17 | End: 2024-03-19

## 2024-03-17 RX ORDER — GLUCAGON INJECTION, SOLUTION 0.5 MG/.1ML
1 INJECTION, SOLUTION SUBCUTANEOUS ONCE
Refills: 0 | Status: DISCONTINUED | OUTPATIENT
Start: 2024-03-17 | End: 2024-03-19

## 2024-03-17 RX ORDER — DEXTROSE 50 % IN WATER 50 %
25 SYRINGE (ML) INTRAVENOUS ONCE
Refills: 0 | Status: DISCONTINUED | OUTPATIENT
Start: 2024-03-17 | End: 2024-03-19

## 2024-03-17 RX ORDER — DEXTROSE 50 % IN WATER 50 %
12.5 SYRINGE (ML) INTRAVENOUS ONCE
Refills: 0 | Status: DISCONTINUED | OUTPATIENT
Start: 2024-03-17 | End: 2024-03-19

## 2024-03-17 RX ORDER — INSULIN LISPRO 100/ML
9 VIAL (ML) SUBCUTANEOUS
Refills: 0 | Status: DISCONTINUED | OUTPATIENT
Start: 2024-03-17 | End: 2024-03-19

## 2024-03-17 RX ORDER — INSULIN LISPRO 100/ML
VIAL (ML) SUBCUTANEOUS AT BEDTIME
Refills: 0 | Status: DISCONTINUED | OUTPATIENT
Start: 2024-03-17 | End: 2024-03-18

## 2024-03-17 RX ORDER — INSULIN GLARGINE 100 [IU]/ML
12 INJECTION, SOLUTION SUBCUTANEOUS AT BEDTIME
Refills: 0 | Status: DISCONTINUED | OUTPATIENT
Start: 2024-03-17 | End: 2024-03-18

## 2024-03-17 RX ORDER — SODIUM CHLORIDE 9 MG/ML
1000 INJECTION, SOLUTION INTRAVENOUS
Refills: 0 | Status: DISCONTINUED | OUTPATIENT
Start: 2024-03-17 | End: 2024-03-19

## 2024-03-17 RX ORDER — INSULIN LISPRO 100/ML
7 VIAL (ML) SUBCUTANEOUS
Refills: 0 | Status: DISCONTINUED | OUTPATIENT
Start: 2024-03-17 | End: 2024-03-17

## 2024-03-17 RX ORDER — INSULIN LISPRO 100/ML
VIAL (ML) SUBCUTANEOUS
Refills: 0 | Status: DISCONTINUED | OUTPATIENT
Start: 2024-03-17 | End: 2024-03-18

## 2024-03-17 RX ADMIN — Medication 81 MILLIGRAM(S): at 11:44

## 2024-03-17 RX ADMIN — Medication 1 DROP(S): at 17:57

## 2024-03-17 RX ADMIN — HEPARIN SODIUM 5000 UNIT(S): 5000 INJECTION INTRAVENOUS; SUBCUTANEOUS at 13:40

## 2024-03-17 RX ADMIN — Medication 12 UNIT(S): at 07:29

## 2024-03-17 RX ADMIN — Medication 1: at 17:25

## 2024-03-17 RX ADMIN — Medication 9 UNIT(S): at 12:06

## 2024-03-17 RX ADMIN — Medication 25 MILLIGRAM(S): at 17:56

## 2024-03-17 RX ADMIN — INSULIN GLARGINE 12 UNIT(S): 100 INJECTION, SOLUTION SUBCUTANEOUS at 21:59

## 2024-03-17 RX ADMIN — Medication 4: at 12:07

## 2024-03-17 RX ADMIN — Medication 25 MILLIGRAM(S): at 06:12

## 2024-03-17 RX ADMIN — POLYETHYLENE GLYCOL 3350 17 GRAM(S): 17 POWDER, FOR SOLUTION ORAL at 11:44

## 2024-03-17 RX ADMIN — Medication 1 DROP(S): at 06:12

## 2024-03-17 RX ADMIN — SODIUM CHLORIDE 3 MILLILITER(S): 9 INJECTION INTRAMUSCULAR; INTRAVENOUS; SUBCUTANEOUS at 21:34

## 2024-03-17 RX ADMIN — PANTOPRAZOLE SODIUM 40 MILLIGRAM(S): 20 TABLET, DELAYED RELEASE ORAL at 06:12

## 2024-03-17 RX ADMIN — Medication 9 UNIT(S): at 17:25

## 2024-03-17 RX ADMIN — Medication 2: at 22:01

## 2024-03-17 RX ADMIN — SENNA PLUS 2 TABLET(S): 8.6 TABLET ORAL at 21:32

## 2024-03-17 RX ADMIN — LATANOPROST 1 DROP(S): 0.05 SOLUTION/ DROPS OPHTHALMIC; TOPICAL at 22:02

## 2024-03-17 RX ADMIN — SODIUM CHLORIDE 3 MILLILITER(S): 9 INJECTION INTRAMUSCULAR; INTRAVENOUS; SUBCUTANEOUS at 13:38

## 2024-03-17 RX ADMIN — HEPARIN SODIUM 5000 UNIT(S): 5000 INJECTION INTRAVENOUS; SUBCUTANEOUS at 21:31

## 2024-03-17 RX ADMIN — HEPARIN SODIUM 5000 UNIT(S): 5000 INJECTION INTRAVENOUS; SUBCUTANEOUS at 06:12

## 2024-03-17 RX ADMIN — ATORVASTATIN CALCIUM 40 MILLIGRAM(S): 80 TABLET, FILM COATED ORAL at 21:31

## 2024-03-17 RX ADMIN — SODIUM CHLORIDE 3 MILLILITER(S): 9 INJECTION INTRAMUSCULAR; INTRAVENOUS; SUBCUTANEOUS at 06:53

## 2024-03-17 NOTE — PROGRESS NOTE ADULT - SUBJECTIVE AND OBJECTIVE BOX
Patient discussed on morning rounds with Dr. Ryder    OPERATION & DATE: OR Tuesday for AVR     SUBJECTIVE ASSESSMENT:  Assessed at bedside today. No acute complaints. Denies chest pain, fever, chills, nausea, vomiting. Ready for OR Tuesday.     VITAL SIGNS:  Vital Signs Last 24 Hrs  T(C): 36.1 (17 Mar 2024 05:07), Max: 36.7 (16 Mar 2024 17:09)  T(F): 97 (17 Mar 2024 05:07), Max: 98.1 (16 Mar 2024 17:09)  HR: 58 (17 Mar 2024 13:43) (56 - 76)  BP: 97/46 (17 Mar 2024 13:43) (92/53 - 121/57)  BP(mean): 66 (17 Mar 2024 13:43) (66 - 84)  RR: 16 (17 Mar 2024 13:43) (12 - 18)  SpO2: 99% (17 Mar 2024 13:43) (94% - 100%)    Parameters below as of 17 Mar 2024 13:43  Patient On (Oxygen Delivery Method): room air      I&O's Detail    Physical Exam  CONSTITUTIONAL: Well appearing in NAD assessed laying comfortably in bed   NEURO: A&OX3. No focal deficits noted, moving bilateral upper and lower extremities                    CV: RRR, +LORRAINE, no rubs, gallops  RESPIRATORY: Clear to auscultation bilateral posterior lung fields, no wheezes, rales, rhonchi   GI: +BS, NT/ND  MUSKULOSKELETAL: No peripheral edema or calf tenderness. Full strength and ROM bilateral upper and lower extremities   VASCULAR: Bilateral distal pulses 2+  INCISIONS: None    LABS:                        10.7   6.70  )-----------( 359      ( 17 Mar 2024 06:53 )             34.8     PT/INR - ( 17 Mar 2024 06:53 )   PT: 11.6 sec;   INR: 1.02          PTT - ( 17 Mar 2024 06:53 )  PTT:47.1 sec  03-17    143  |  106  |  11  ----------------------------<  100<H>  4.4   |  25  |  0.72    Ca    10.0      17 Mar 2024 06:53  Phos  4.5     03-17  Mg     2.1     03-17    TPro  6.3  /  Alb  3.8  /  TBili  0.4  /  DBili  x   /  AST  18  /  ALT  16  /  AlkPhos  58  03-17    Urinalysis Basic - ( 17 Mar 2024 06:53 )    Color: x / Appearance: x / SG: x / pH: x  Gluc: 100 mg/dL / Ketone: x  / Bili: x / Urobili: x   Blood: x / Protein: x / Nitrite: x   Leuk Esterase: x / RBC: x / WBC x   Sq Epi: x / Non Sq Epi: x / Bacteria: x      MEDICATIONS  (STANDING):  aspirin  chewable 81 milliGRAM(s) Oral daily  atorvastatin 40 milliGRAM(s) Oral at bedtime  chlorhexidine 4% Liquid 1 Application(s) Topical once  dextrose 5%. 1000 milliLiter(s) (50 mL/Hr) IV Continuous <Continuous>  dextrose 5%. 1000 milliLiter(s) (100 mL/Hr) IV Continuous <Continuous>  dextrose 50% Injectable 25 Gram(s) IV Push once  dextrose 50% Injectable 12.5 Gram(s) IV Push once  dextrose 50% Injectable 25 Gram(s) IV Push once  glucagon  Injectable 1 milliGRAM(s) IntraMuscular once  heparin   Injectable 5000 Unit(s) SubCutaneous every 8 hours  insulin glargine Injectable (LANTUS) 12 Unit(s) SubCutaneous at bedtime  insulin lispro (ADMELOG) corrective regimen sliding scale   SubCutaneous three times a day before meals  insulin lispro (ADMELOG) corrective regimen sliding scale   SubCutaneous at bedtime  insulin lispro Injectable (ADMELOG) 9 Unit(s) SubCutaneous three times a day before meals  latanoprost 0.005% Ophthalmic Solution 1 Drop(s) Both EYES at bedtime  metoprolol tartrate 25 milliGRAM(s) Oral every 12 hours  pantoprazole    Tablet 40 milliGRAM(s) Oral before breakfast  polyethylene glycol 3350 17 Gram(s) Oral daily  senna 2 Tablet(s) Oral at bedtime  sodium chloride 0.9% lock flush 3 milliLiter(s) IV Push every 8 hours  timolol 0.5% Solution 1 Drop(s) Both EYES two times a day    MEDICATIONS  (PRN):  acetaminophen     Tablet .. 650 milliGRAM(s) Oral every 6 hours PRN Mild Pain (1 - 3)  bisacodyl 5 milliGRAM(s) Oral at bedtime PRN Constipation  dextrose Oral Gel 15 Gram(s) Oral once PRN Blood Glucose LESS THAN 70 milliGRAM(s)/deciliter    RADIOLOGY & ADDITIONAL TESTS:    < from: TTE Echo Complete w/ Contrast w/ Doppler (03.11.24 @ 10:10) >  CONCLUSIONS:     1. Normal left and right ventricular size and systolic function.   2. Mild symmetric left ventricular hypertrophy.   3. Critically severe aortic stenosis. The peak trasnvavluar velocity is   5.33 m/s, the mean transvalvular gradient is 73 mmHg, and the LVOT/AV   ratio is 0.16. Rick aortic valve area (estimated via the continuity   method) is 0.43cm2.   4. Normal atria   5. No evidence of pulmonary hypertension.   6. No pericardial effusion.   7. Compared to the previous TTE performed on 7/3/2023, aortic valve area   has decreased.    < end of copied text >

## 2024-03-17 NOTE — PROGRESS NOTE ADULT - SUBJECTIVE AND OBJECTIVE BOX
Patient awake, no acute events overnight. Tightly controlled glucose. She's eating but not as much as what she eats at home, different kinds of foods.    MEDICATIONS  (STANDING):  aspirin  chewable 81 milliGRAM(s) Oral daily  atorvastatin 40 milliGRAM(s) Oral at bedtime  chlorhexidine 4% Liquid 1 Application(s) Topical once  dextrose 5%. 1000 milliLiter(s) (50 mL/Hr) IV Continuous <Continuous>  dextrose 5%. 1000 milliLiter(s) (100 mL/Hr) IV Continuous <Continuous>  dextrose 50% Injectable 25 Gram(s) IV Push once  dextrose 50% Injectable 25 Gram(s) IV Push once  dextrose 50% Injectable 12.5 Gram(s) IV Push once  glucagon  Injectable 1 milliGRAM(s) IntraMuscular once  heparin   Injectable 5000 Unit(s) SubCutaneous every 8 hours  insulin glargine Injectable (LANTUS) 12 Unit(s) SubCutaneous at bedtime  insulin lispro (ADMELOG) corrective regimen sliding scale   SubCutaneous at bedtime  insulin lispro (ADMELOG) corrective regimen sliding scale   SubCutaneous three times a day before meals  insulin lispro Injectable (ADMELOG) 7 Unit(s) SubCutaneous three times a day before meals  latanoprost 0.005% Ophthalmic Solution 1 Drop(s) Both EYES at bedtime  metoprolol tartrate 25 milliGRAM(s) Oral every 12 hours  pantoprazole    Tablet 40 milliGRAM(s) Oral before breakfast  polyethylene glycol 3350 17 Gram(s) Oral daily  senna 2 Tablet(s) Oral at bedtime  sodium chloride 0.9% lock flush 3 milliLiter(s) IV Push every 8 hours  timolol 0.5% Solution 1 Drop(s) Both EYES two times a day    MEDICATIONS  (PRN):  acetaminophen     Tablet .. 650 milliGRAM(s) Oral every 6 hours PRN Mild Pain (1 - 3)  bisacodyl 5 milliGRAM(s) Oral at bedtime PRN Constipation  dextrose Oral Gel 15 Gram(s) Oral once PRN Blood Glucose LESS THAN 70 milliGRAM(s)/deciliter      Allergies  No Known Allergies    Review of Systems:  Constitutional: No fever  Cardiovascular: No chest pain, palpitations  Respiratory: No SOB, no cough  GI: No nausea, vomiting, abdominal pain    PHYSICAL EXAM:  VITALS: T(C): 36.1 (03-17-24 @ 05:07)  T(F): 97 (03-17-24 @ 05:07), Max: 98.1 (03-16-24 @ 17:09)  HR: 62 (03-17-24 @ 09:22) (56 - 76)  BP: 92/53 (03-17-24 @ 09:22) (92/53 - 121/57)  RR:  (12 - 18)  SpO2:  (94% - 100%)  Wt(kg): --  GENERAL: NAD, well-groomed, well-developed  RESPIRATORY: Respirations are even and unlabored, no use of accessory muscles  CARDIOVASCULAR: Regular rate   GI: Soft, nontender  PSYCH: Alert, reactive affect, normal mood    POCT Blood Glucose.: 101 mg/dL (03-17-24 @ 06:52)  Lispro 12  POCT Blood Glucose.: 80 mg/dL (03-16-24 @ 21:24) Lantus 14  POCT Blood Glucose.: 190 mg/dL (03-16-24 @ 16:53) Lispro 12+2  POCT Blood Glucose.: 305 mg/dL (03-16-24 @ 11:36) Lispro 7 +8  POCT Blood Glucose.: 99 mg/dL (03-16-24 @ 06:35)  POCT Blood Glucose.: 143 mg/dL (03-15-24 @ 21:49)  Lantus 14  POCT Blood Glucose.: 104 mg/dL (03-15-24 @ 16:39) Admelog 8  POCT Blood Glucose.: 295 mg/dL (03-15-24 @ 13:54)    POCT Blood Glucose.: 445 mg/dL (03-15-24 @ 11:17)  Admelog 8+12  POCT Blood Glucose.: 186 mg/dL (03-15-24 @ 08:19)  POCT Blood Glucose.: 251 mg/dL (03-15-24 @ 00:34)  POCT Blood Glucose.: 419 mg/dL (03-14-24 @ 22:04)  POCT Blood Glucose.: 301 mg/dL (03-14-24 @ 16:36)  POCT Blood Glucose.: 119 mg/dL (03-14-24 @ 14:11)  POCT Blood Glucose.: 121 mg/dL (03-14-24 @ 11:23)      03-17    143  |  106  |  11  ----------------------------<  100<H>  4.4   |  25  |  0.72    eGFR: 89    Ca    10.0      03-17  Mg     2.1     03-17  Phos  4.5     03-17    TPro  6.3  /  Alb  3.8  /  TBili  0.4  /  DBili  x   /  AST  18  /  ALT  16  /  AlkPhos  58  03-17      Thyroid Function Tests:  03-12 @ 22:12 TSH 1.530

## 2024-03-17 NOTE — PROGRESS NOTE ADULT - PROBLEM SELECTOR PLAN 1
-patient with tightly controlled AM glucose. On 3/16, I recommended and discussed with YOSHI Hazel, recommendations to decrease Lantus from 14 to 12 units and continue Admelog 7 units TID with meals.    -Per the EMR, the patient receive 14 units and 12 units for Lunch/Dinner on 3/16.  She also received Admelog 12 units with breakfast this morning per EMR  -recommend, again, Lantus 12 units at bedtime, Admelog 9 units with meals, low correction sliding scale and bedtime sliding scale. Patients total insulin needs over 24 hours is about 55 units.  -target glucose 100-180  -Plan discussed with PA  -hypoglycemia protocol  -consistent carb diet

## 2024-03-17 NOTE — PROGRESS NOTE ADULT - ASSESSMENT
Patient is a 74 yo woman with uncontrolled T2DM (A1c of 9.6%) and cardiac comorbidities including, severe AS, EF 60%, HTN, HLD, anemia who was admitted to [facility] with syncope. CT negative for CVA, but echo showed severe AS. Transferred to [facility] for SAVR.

## 2024-03-18 ENCOUNTER — TRANSCRIPTION ENCOUNTER (OUTPATIENT)
Age: 74
End: 2024-03-18

## 2024-03-18 LAB
ANION GAP SERPL CALC-SCNC: 10 MMOL/L — SIGNIFICANT CHANGE UP (ref 5–17)
BLD GP AB SCN SERPL QL: NEGATIVE — SIGNIFICANT CHANGE UP
BUN SERPL-MCNC: 20 MG/DL — SIGNIFICANT CHANGE UP (ref 7–23)
CALCIUM SERPL-MCNC: 9.5 MG/DL — SIGNIFICANT CHANGE UP (ref 8.4–10.5)
CHLORIDE SERPL-SCNC: 106 MMOL/L — SIGNIFICANT CHANGE UP (ref 96–108)
CO2 SERPL-SCNC: 22 MMOL/L — SIGNIFICANT CHANGE UP (ref 22–31)
CREAT SERPL-MCNC: 0.77 MG/DL — SIGNIFICANT CHANGE UP (ref 0.5–1.3)
EGFR: 82 ML/MIN/1.73M2 — SIGNIFICANT CHANGE UP
GLUCOSE BLDC GLUCOMTR-MCNC: 144 MG/DL — HIGH (ref 70–99)
GLUCOSE BLDC GLUCOMTR-MCNC: 232 MG/DL — HIGH (ref 70–99)
GLUCOSE BLDC GLUCOMTR-MCNC: 326 MG/DL — HIGH (ref 70–99)
GLUCOSE BLDC GLUCOMTR-MCNC: 364 MG/DL — HIGH (ref 70–99)
GLUCOSE SERPL-MCNC: 370 MG/DL — HIGH (ref 70–99)
HCT VFR BLD CALC: 38 % — SIGNIFICANT CHANGE UP (ref 34.5–45)
HGB BLD-MCNC: 11.3 G/DL — LOW (ref 11.5–15.5)
MAGNESIUM SERPL-MCNC: 2.1 MG/DL — SIGNIFICANT CHANGE UP (ref 1.6–2.6)
MCHC RBC-ENTMCNC: 24.5 PG — LOW (ref 27–34)
MCHC RBC-ENTMCNC: 29.7 GM/DL — LOW (ref 32–36)
MCV RBC AUTO: 82.4 FL — SIGNIFICANT CHANGE UP (ref 80–100)
NRBC # BLD: 0 /100 WBCS — SIGNIFICANT CHANGE UP (ref 0–0)
PLATELET # BLD AUTO: 439 K/UL — HIGH (ref 150–400)
POTASSIUM SERPL-MCNC: 4.9 MMOL/L — SIGNIFICANT CHANGE UP (ref 3.5–5.3)
POTASSIUM SERPL-SCNC: 4.9 MMOL/L — SIGNIFICANT CHANGE UP (ref 3.5–5.3)
RBC # BLD: 4.61 M/UL — SIGNIFICANT CHANGE UP (ref 3.8–5.2)
RBC # FLD: 13.9 % — SIGNIFICANT CHANGE UP (ref 10.3–14.5)
RH IG SCN BLD-IMP: POSITIVE — SIGNIFICANT CHANGE UP
SODIUM SERPL-SCNC: 138 MMOL/L — SIGNIFICANT CHANGE UP (ref 135–145)
WBC # BLD: 10.87 K/UL — HIGH (ref 3.8–10.5)
WBC # FLD AUTO: 10.87 K/UL — HIGH (ref 3.8–10.5)

## 2024-03-18 PROCEDURE — 99232 SBSQ HOSP IP/OBS MODERATE 35: CPT

## 2024-03-18 PROCEDURE — 94010 BREATHING CAPACITY TEST: CPT | Mod: 26

## 2024-03-18 RX ORDER — CHLORHEXIDINE GLUCONATE 213 G/1000ML
10 SOLUTION TOPICAL ONCE
Refills: 0 | Status: COMPLETED | OUTPATIENT
Start: 2024-03-18 | End: 2024-03-19

## 2024-03-18 RX ORDER — INSULIN GLARGINE 100 [IU]/ML
14 INJECTION, SOLUTION SUBCUTANEOUS AT BEDTIME
Refills: 0 | Status: DISCONTINUED | OUTPATIENT
Start: 2024-03-18 | End: 2024-03-19

## 2024-03-18 RX ORDER — CHLORHEXIDINE GLUCONATE 213 G/1000ML
1 SOLUTION TOPICAL ONCE
Refills: 0 | Status: COMPLETED | OUTPATIENT
Start: 2024-03-18 | End: 2024-03-18

## 2024-03-18 RX ORDER — GABAPENTIN 400 MG/1
300 CAPSULE ORAL ONCE
Refills: 0 | Status: COMPLETED | OUTPATIENT
Start: 2024-03-18 | End: 2024-03-19

## 2024-03-18 RX ORDER — INSULIN LISPRO 100/ML
VIAL (ML) SUBCUTANEOUS
Refills: 0 | Status: DISCONTINUED | OUTPATIENT
Start: 2024-03-18 | End: 2024-03-19

## 2024-03-18 RX ORDER — MUPIROCIN 20 MG/G
1 OINTMENT TOPICAL
Refills: 0 | Status: DISCONTINUED | OUTPATIENT
Start: 2024-03-18 | End: 2024-03-19

## 2024-03-18 RX ORDER — ACETAMINOPHEN 500 MG
1000 TABLET ORAL ONCE
Refills: 0 | Status: COMPLETED | OUTPATIENT
Start: 2024-03-18 | End: 2024-03-19

## 2024-03-18 RX ORDER — INSULIN LISPRO 100/ML
VIAL (ML) SUBCUTANEOUS AT BEDTIME
Refills: 0 | Status: DISCONTINUED | OUTPATIENT
Start: 2024-03-18 | End: 2024-03-18

## 2024-03-18 RX ADMIN — Medication 9 UNIT(S): at 12:01

## 2024-03-18 RX ADMIN — SENNA PLUS 2 TABLET(S): 8.6 TABLET ORAL at 21:05

## 2024-03-18 RX ADMIN — HEPARIN SODIUM 5000 UNIT(S): 5000 INJECTION INTRAVENOUS; SUBCUTANEOUS at 14:06

## 2024-03-18 RX ADMIN — SODIUM CHLORIDE 3 MILLILITER(S): 9 INJECTION INTRAMUSCULAR; INTRAVENOUS; SUBCUTANEOUS at 14:31

## 2024-03-18 RX ADMIN — SODIUM CHLORIDE 3 MILLILITER(S): 9 INJECTION INTRAMUSCULAR; INTRAVENOUS; SUBCUTANEOUS at 05:37

## 2024-03-18 RX ADMIN — Medication 4: at 12:02

## 2024-03-18 RX ADMIN — CHLORHEXIDINE GLUCONATE 1 APPLICATION(S): 213 SOLUTION TOPICAL at 19:58

## 2024-03-18 RX ADMIN — INSULIN GLARGINE 14 UNIT(S): 100 INJECTION, SOLUTION SUBCUTANEOUS at 22:02

## 2024-03-18 RX ADMIN — Medication 5: at 07:11

## 2024-03-18 RX ADMIN — Medication 25 MILLIGRAM(S): at 05:36

## 2024-03-18 RX ADMIN — HEPARIN SODIUM 5000 UNIT(S): 5000 INJECTION INTRAVENOUS; SUBCUTANEOUS at 21:01

## 2024-03-18 RX ADMIN — ATORVASTATIN CALCIUM 40 MILLIGRAM(S): 80 TABLET, FILM COATED ORAL at 21:01

## 2024-03-18 RX ADMIN — Medication 1 DROP(S): at 19:51

## 2024-03-18 RX ADMIN — LATANOPROST 1 DROP(S): 0.05 SOLUTION/ DROPS OPHTHALMIC; TOPICAL at 21:02

## 2024-03-18 RX ADMIN — Medication 81 MILLIGRAM(S): at 12:40

## 2024-03-18 RX ADMIN — Medication 1 DROP(S): at 05:37

## 2024-03-18 RX ADMIN — Medication 9 UNIT(S): at 17:32

## 2024-03-18 RX ADMIN — HEPARIN SODIUM 5000 UNIT(S): 5000 INJECTION INTRAVENOUS; SUBCUTANEOUS at 05:37

## 2024-03-18 RX ADMIN — Medication 9 UNIT(S): at 07:10

## 2024-03-18 RX ADMIN — Medication 4: at 22:00

## 2024-03-18 RX ADMIN — SODIUM CHLORIDE 3 MILLILITER(S): 9 INJECTION INTRAMUSCULAR; INTRAVENOUS; SUBCUTANEOUS at 21:01

## 2024-03-18 RX ADMIN — MUPIROCIN 1 APPLICATION(S): 20 OINTMENT TOPICAL at 19:48

## 2024-03-18 RX ADMIN — PANTOPRAZOLE SODIUM 40 MILLIGRAM(S): 20 TABLET, DELAYED RELEASE ORAL at 06:22

## 2024-03-18 RX ADMIN — CHLORHEXIDINE GLUCONATE 1 APPLICATION(S): 213 SOLUTION TOPICAL at 20:55

## 2024-03-18 NOTE — DIETITIAN INITIAL EVALUATION ADULT - PERTINENT MEDS FT
MEDICATIONS  (STANDING):  acetaminophen   IVPB .. 1000 milliGRAM(s) IV Intermittent once  aspirin  chewable 81 milliGRAM(s) Oral daily  atorvastatin 40 milliGRAM(s) Oral at bedtime  chlorhexidine 0.12% Liquid 10 milliLiter(s) Swish and Spit once  chlorhexidine 4% Liquid 1 Application(s) Topical once  chlorhexidine 4% Liquid 1 Application(s) Topical once  dextrose 5%. 1000 milliLiter(s) (100 mL/Hr) IV Continuous <Continuous>  dextrose 5%. 1000 milliLiter(s) (50 mL/Hr) IV Continuous <Continuous>  dextrose 50% Injectable 25 Gram(s) IV Push once  dextrose 50% Injectable 12.5 Gram(s) IV Push once  dextrose 50% Injectable 25 Gram(s) IV Push once  gabapentin 300 milliGRAM(s) Oral once  glucagon  Injectable 1 milliGRAM(s) IntraMuscular once  heparin   Injectable 5000 Unit(s) SubCutaneous every 8 hours  insulin glargine Injectable (LANTUS) 12 Unit(s) SubCutaneous at bedtime  insulin lispro (ADMELOG) corrective regimen sliding scale   SubCutaneous three times a day before meals  insulin lispro (ADMELOG) corrective regimen sliding scale   SubCutaneous at bedtime  insulin lispro Injectable (ADMELOG) 9 Unit(s) SubCutaneous three times a day before meals  latanoprost 0.005% Ophthalmic Solution 1 Drop(s) Both EYES at bedtime  metoprolol tartrate 25 milliGRAM(s) Oral every 12 hours  mupirocin 2% Ointment 1 Application(s) Both Nostrils two times a day  pantoprazole    Tablet 40 milliGRAM(s) Oral before breakfast  polyethylene glycol 3350 17 Gram(s) Oral daily  senna 2 Tablet(s) Oral at bedtime  sodium chloride 0.9% lock flush 3 milliLiter(s) IV Push every 8 hours  timolol 0.5% Solution 1 Drop(s) Both EYES two times a day    MEDICATIONS  (PRN):  acetaminophen     Tablet .. 650 milliGRAM(s) Oral every 6 hours PRN Mild Pain (1 - 3)  bisacodyl 5 milliGRAM(s) Oral at bedtime PRN Constipation  dextrose Oral Gel 15 Gram(s) Oral once PRN Blood Glucose LESS THAN 70 milliGRAM(s)/deciliter

## 2024-03-18 NOTE — DIETITIAN INITIAL EVALUATION ADULT - OTHER CALCULATIONS
Estimated needs based on dosing wt as within % IBW 110lb/50kg (112%). Needs adjusted for age and pre-op status.

## 2024-03-18 NOTE — PROGRESS NOTE ADULT - PROBLEM SELECTOR PLAN 1
ype 2 diabetes mellitus with hyperglycemia  - Please give lantus  units at bedtime.   - Give lispro units before each meal.  - Continue lispro moderate dose sliding scale before meals and at bedtime.  - Patient's fingerstick glucose goal is 100-180 mg/dL.    - Consistent carb diet.  - For discharge, patient can ***.    - Patient can follow up at discharge with [facility] by calling [phone] to make an appointment.      Discussed with Dr Huffman. Primary team updated. ype 2 diabetes mellitus with hyperglycemia  - Please increase lantus to 14  units at bedtime.   - Increase lispro to 16 units before breakfast and 10 units before lunch and dinner.  - Continue lispro moderate dose sliding scale before meals and at bedtime.  - Patient's fingerstick glucose goal is 100-180 mg/dL.    - Consistent carb diet.  - For discharge, patient can ***.    - Patient can follow up at discharge with [facility] by calling [phone] to make an appointment.      Discussed with Dr Huffman. Primary team updated.

## 2024-03-18 NOTE — PROGRESS NOTE ADULT - ASSESSMENT
This is a 72 y/o female PMHx DM, severe AS, HTN, HLD, anemia who was admitted to [facility] with syncope. CT negative for CVA. Echo showed severe AS. Transferred to [facility] under Dr. Moon for evaluation for TAVR vs BAV. Upon review of structural scans, patient was deemed an inappropriate candidate for TAVR 2/2 size of aorta. CTS consulted for evaluation for AVR. 3/14/24 cardiac cath pRCA 50%. Plan for OR for AVR Tuesday.    OR tomorrow  T&S  Pre-op orders placed, and blood/products on hold for OR  PST complete, see reports above.      **FS not controlled.  Endo following.  Changed diet and educated about not drinking fruit juice etc.  Remains on lantus/lispro, adjust as per Endo recs.  Pt seen but not yet provided recs for today, will F/U**

## 2024-03-18 NOTE — PRE-ANESTHESIA EVALUATION ADULT - LAST ECHOCARDIOGRAM
3/11/24, report reviewed.  mild LVH, NL LV function.  EF 60-65% EF.  NL RV.  severe AS, PG 113mmHg, MG 73mmHg, BRIDGETTE 0.43cm2.

## 2024-03-18 NOTE — DIETITIAN INITIAL EVALUATION ADULT - OTHER INFO
73F with PMHx DM, severe AS, HTN, HLD, anemia who was admitted to [facility] with syncope. CT negative for CVA. Echo showed severe AS. Transferred to [facility] for evaluation for TAVR vs BAV. Upon review of structural scans, patient was deemed an inappropriate candidate for TAVR secondary to size of aorta. CTS consulted for evaluation for AVR. 3/14/24 cardiac cath pRCA 50%. Plan for OR for AVR Tuesday.    Pt seen on 9LA for assessment. Labs and medication orders reviewed. Ordered for 12U lantus. Electrolytes WNL, POC blood glucose (3/17-3/18) 101-364, HgbA1c (3/13) 9.6%. On Regular diet with Glucerna x1/day. Pt reports good appetite and intake PTA and in-house. Wt history per [facility]: (4/27/23) 133lb, (6/12/23) 137lb, (7/4/23) 126lb; current admission wt 123lb indicates ~14lb/10% wt loss in 9 months - not clinically significant. Denies nausea/vomiting/diarrhea/constipation, reports last BM this AM. Pt denies difficulty chewing/swallowing. Confirms no known food allergies. No [employer]/ethnic/cultural food preferences noted. No pressure injuries or edema documented, Timur score 21. Pt reports limited compliance with Consistent Carbohydrate diet PTA, RD provided written and verbal education on nutrition therapy for blood glucose control. Discussed oral nutrition supplements, pt endorses preference for Boost Glucose Control, reports dislike for Glucerna/Ensure. See nutrition recommendations. RD to remain available.

## 2024-03-18 NOTE — PROGRESS NOTE ADULT - ASSESSMENT
74 y/o female PMH DM, severe AS, EF 60%, HTN, HLD, anemia who was admitted to [facility] with syncope. CT negative for CVA, but echo showed severe AS planned for SAVR 3/19/2024.    Endocrine consulted for T2 diabetes management. Education deficit, but responsive to information provided.  Hyperglycemia due to excessive juice and fruit. Diet now changed to consistent carb, but patient upset about juice elimination.    A1C: 9.6 %  BUN: 20  Creatinine: 0.77  GFR: 82  Weight: 55.9  BMI: 22.5  EF: 60% 72 y/o female PMH DM, severe AS, EF 60%, HTN, HLD, anemia who was admitted to [facility] with syncope. CT negative for CVA, but echo showed severe AS planned for SAVR 3/19/2024.    Endocrine consulted for T2 diabetes management. Education deficit, but responsive to information provided.  Hyperglycemia due to excessive juice and fruit. Diet now changed to consistent carb, but patient upset about juice elimination.    A1C: 9.6 %  C-peptide 1.8 with  - March 2023  BUN: 20  Creatinine: 0.77  GFR: 82  Weight: 55.9  BMI: 22.5  EF: 60%

## 2024-03-18 NOTE — DIETITIAN INITIAL EVALUATION ADULT - ADD RECOMMEND
1. Recommend Consistent Carbohydrate (evening snack) diet.   >>Continue Glucerna Shake (220kcal, 10g protein) if pt amenable.   >>Encourage & monitor PO intake. [place] dietary preferences as able.   2. Monitor GI tolerance, weight trends, labs, & skin integrity.  3. Defer bowel and pain regimens to team.   4. RD to remain available for diet education/intervention prn.

## 2024-03-18 NOTE — PRE-ANESTHESIA EVALUATION ADULT - NSANTHADDINFOFT_GEN_ALL_CORE
Progress Note Adult-CT Surgery PA [Charted Location: [address] 92 02] [Authored: 18-Mar-2024 13:24]- for Visit: [phone], Complete, Entered, Signed in Full, Available to Patient    Progress Note:   · Provider Specialty	CT Surgery    Reason for Admission:    Reason for Admission:  · Reason for Admission	severe AS      · Subjective and Objective:   Patient discussed on morning rounds with Dr. Rhoades    Operation / Date: AVR/root on 3/19/24    SUBJECTIVE ASSESSMENT:  Patient feels well today.  Denies CP/SOB/N/V/D/dizziness/cough/fever/chills.  OOB w/o issues, eating w/o issues.        Vital Signs Last 24 Hrs  T(C): 36.4 (18 Mar 2024 09:42), Max: 36.6 (17 Mar 2024 17:13)  T(F): 97.6 (18 Mar 2024 09:42), Max: 97.9 (17 Mar 2024 17:13)  HR: 70 (18 Mar 2024 05:00) (58 - 74)  BP: 113/56 (18 Mar 2024 05:00) (97/46 - 124/57)  BP(mean): 80 (18 Mar 2024 05:00) (66 - 82)  RR: 17 (18 Mar 2024 05:00) (16 - 18)  SpO2: 95% (18 Mar 2024 05:00) (95% - 99%)    Parameters below as of 18 Mar 2024 05:00  Patient On (Oxygen Delivery Method): room air      I&O's Detail    17 Mar 2024 07:01  -  18 Mar 2024 07:00  --------------------------------------------------------  IN:    Oral Fluid: 720 mL  Total IN: 720 mL    OUT:  Total OUT: 0 mL    Total NET: 720 mL      PHYSICAL EXAM:    GEN: NAD, looks comfortable  Psych: Mood appropriate  Neuro: A&Ox3.  No focal deficits.  Moving all extremities.   HEENT: No obvious abnormalities  CV: S1S2, regular, +murmur  heard throughout precordium, loudest 2nd ICS on right side.  No carotid bruits.  No JVD  Lungs: Clear B/L.  No wheezing, rales or rhonchi  ABD: Soft, non-tender, non-distended.  +Bowel sounds  EXT: Warm and well perfused.  No peripheral edema noted  Musculoskeletal: Moving all extremities with normal ROM, no joint swelling  PV: Pedal pulses palpable      LABS:                        11.3   10.87 )-----------( 439      ( 18 Mar 2024 05:30 )             38.0     PT/INR - ( 17 Mar 2024 06:53 )   PT: 11.6 sec;   INR: 1.02          PTT - ( 17 Mar 2024 06:53 )  PTT:47.1 sec    03-18    138  |  106  |  20  ----------------------------<  370<H>  4.9   |  22  |  0.77    Ca    9.5      18 Mar 2024 05:30  Phos  4.5     03-17  Mg     2.1     03-18    TPro  6.3  /  Alb  3.8  /  TBili  0.4  /  DBili  x   /  AST  18  /  ALT  16  /  AlkPhos  58  03-17      Urinalysis Basic - ( 18 Mar 2024 05:30 )    Color: x / Appearance: x / SG: x / pH: x  Gluc: 370 mg/dL / Ketone: x  / Bili: x / Urobili: x   Blood: x / Protein: x / Nitrite: x   Leuk Esterase: x / RBC: x / WBC x   Sq Epi: x / Non Sq Epi: x / Bacteria: x        MEDICATIONS  (STANDING):  acetaminophen   IVPB .. 1000 milliGRAM(s) IV Intermittent once  aspirin  chewable 81 milliGRAM(s) Oral daily  atorvastatin 40 milliGRAM(s) Oral at bedtime  chlorhexidine 0.12% Liquid 10 milliLiter(s) Swish and Spit once  chlorhexidine 4% Liquid 1 Application(s) Topical once  chlorhexidine 4% Liquid 1 Application(s) Topical once  dextrose 5%. 1000 milliLiter(s) (100 mL/Hr) IV Continuous <Continuous>  dextrose 5%. 1000 milliLiter(s) (50 mL/Hr) IV Continuous <Continuous>  dextrose 50% Injectable 25 Gram(s) IV Push once  dextrose 50% Injectable 12.5 Gram(s) IV Push once  dextrose 50% Injectable 25 Gram(s) IV Push once  gabapentin 300 milliGRAM(s) Oral once  glucagon  Injectable 1 milliGRAM(s) IntraMuscular once  heparin   Injectable 5000 Unit(s) SubCutaneous every 8 hours  insulin glargine Injectable (LANTUS) 12 Unit(s) SubCutaneous at bedtime  insulin lispro (ADMELOG) corrective regimen sliding scale   SubCutaneous three times a day before meals  insulin lispro (ADMELOG) corrective regimen sliding scale   SubCutaneous at bedtime  insulin lispro Injectable (ADMELOG) 9 Unit(s) SubCutaneous three times a day before meals  latanoprost 0.005% Ophthalmic Solution 1 Drop(s) Both EYES at bedtime  metoprolol tartrate 25 milliGRAM(s) Oral every 12 hours  mupirocin 2% Ointment 1 Application(s) Both Nostrils two times a day  pantoprazole    Tablet 40 milliGRAM(s) Oral before breakfast  polyethylene glycol 3350 17 Gram(s) Oral daily  senna 2 Tablet(s) Oral at bedtime  sodium chloride 0.9% lock flush 3 milliLiter(s) IV Push every 8 hours  timolol 0.5% Solution 1 Drop(s) Both EYES two times a day    MEDICATIONS  (PRN):  acetaminophen     Tablet .. 650 milliGRAM(s) Oral every 6 hours PRN Mild Pain (1 - 3)  bisacodyl 5 milliGRAM(s) Oral at bedtime PRN Constipation  dextrose Oral Gel 15 Gram(s) Oral once PRN Blood Glucose LESS THAN 70 milliGRAM(s)/deciliter        RADIOLOGY & ADDITIONAL TESTS:    < from: TTE Echo Complete w/ Contrast w/ Doppler (03.11.24 @ 10:10) >  CONCLUSIONS:     1. Normal left and right ventricular size and systolic function.   2. Mild symmetric left ventricular hypertrophy.   3. Critically severe aortic stenosis. The peak trasnvavluar velocity is   5.33 m/s, the mean transvalvular gradient is 73 mmHg, and the LVOT/AV   ratio is 0.16. Rick aortic valve area (estimated via the continuity   method) is 0.43cm2.   4. Normal atria   5. No evidence of pulmonary hypertension.   6. No pericardial effusion.   7. Compared to the previous TTE performed on 7/3/2023, aortic valve area   has decreased.    < end of copied text >    < from: Xray Chest 1 View- PORTABLE-Routine (Xray Chest 1 View- PORTABLE-Routine in AM.) (03.17.24 @ 07:18) >    INTERPRETATION:  Clinical History: Preop    Frontal examination of the chest demonstrates the heart to be within   normal limits in transverse diameter. No acute infiltrates. Calcification   aortic knob. Visualized osseous structures are within normal limits.    IMPRESSION: No acute infiltrates    < end of copied text >    < from: CT Angio Abdomen and Pelvis w/ IV Cont (07.14.23 @ 14:08) >  IMPRESSION:  1.  Aortic measurements as noted above.  2.  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  3.  UNEXPECTED FINDING: Abnormal gallbladder wall thickening with   cholelithiasis, cannot exclude acute cholecystitis. Clinical correlation.    < end of copied text >    < from: US Duplex Carotid Arteries Complete, Bilateral (03.13.24 @ 19:27) >    Antegrade flow is noted within both vertebral arteries.    IMPRESSION: No significant hemodynamic stenosis of either carotid artery.    < end of copied text >        Assessment and Plan:   · Assessment	  This is a 74 y/o female PMHx DM, severe AS, HTN, HLD, anemia who was admitted to [facility] with syncope. CT negative for CVA. Echo showed severe AS. Transferred to [facility] under Dr. Moon for evaluation for TAVR vs BAV. Upon review of structural scans, patient was deemed an inappropriate candidate for TAVR 2/2 size of aorta. CTS consulted for evaluation for AVR. 3/14/24 cardiac cath pRCA 50%. Plan for OR for AVR Tuesday.    OR tomorrow  T&S  Pre-op orders placed, and blood/products on hold for OR  PST complete, see reports above.      **FS not controlled.  Endo following.  Changed diet and educated about not drinking fruit juice etc.  Remains on lantus/lispro, adjust as per Endo recs.  Pt seen but not yet provided recs for today, will F/U**        Electronic Signatures:  Ramsey Parmar (PA)  (Signed 18-Mar-2024 13:36)  	Authored: Progress Note, Reason for Admission, Subjective and Objective, Assessment and Plan      Last Updated: 18-Mar-2024 13:36 by Ramsey Parmar (PA)

## 2024-03-18 NOTE — PROGRESS NOTE ADULT - SUBJECTIVE AND OBJECTIVE BOX
SUBJECTIVE / INTERVAL HPI: Patient was seen and examined this morning. Events of weekend reviewed. Insulin increase somewhat. Patient upset about consistent carb dietary restrictions, mostly juice. She "does not pay any mind to glucose. She is 73 and we all have to die of something." She has been told "by 3 people" that diet drinks have more sugar than regular. Education provided. We discussed increased risks of micro/macro vascular complications from hyperglycemia, as well as the importance of glucose control for improved wound healing and decreased infection risk postop.  She denies drinking any juice yesterday or overnight. She did not eat anything after dinner.     CAPILLARY BLOOD GLUCOSE & INSULIN RECEIVED  101 mg/dL (03-17 @ 06:52) - Lispro 12  100 mg/dL (03-17 @ 06:53)  307 mg/dL (03-17 @ 11:45) - Lispro 9+4  163 mg/dL (03-17 @ 16:44) - Lispro 9+1. Ate chicken string beans and tomatoes, potatoes, and fruit cup.  320 mg/dL (03-17 @ 21:53) - Lantus 12 + 2  370 mg/dL (03-18 @ 05:30)  364 mg/dL (03-18 @ 07:03) - Lispro 9+5. Ate english muffin. SF hot chocolate,  fruit cup.      REVIEW OF SYSTEMS  Constitutional:  Negative fever, chills or loss of appetite.  Eyes:  Negative blurry vision or double vision.  Cardiovascular:  Negative for chest pain or palpitations.  Respiratory:  Negative for cough, wheezing, or shortness of breath.    Gastrointestinal:  Negative for nausea, vomiting, diarrhea, constipation, or abdominal pain.  Genitourinary:  Negative frequency, urgency or dysuria.  Neurologic:  No headache, confusion, dizziness, lightheadedness    PHYSICAL EXAM  Vital Signs Last 24 Hrs  T(C): 36.4 (18 Mar 2024 09:42), Max: 36.6 (17 Mar 2024 17:13)  T(F): 97.6 (18 Mar 2024 09:42), Max: 97.9 (17 Mar 2024 17:13)  HR: 70 (18 Mar 2024 05:00) (58 - 74)  BP: 113/56 (18 Mar 2024 05:00) (97/46 - 124/57)  BP(mean): 80 (18 Mar 2024 05:00) (66 - 82)  RR: 17 (18 Mar 2024 05:00) (16 - 18)  SpO2: 95% (18 Mar 2024 05:00) (95% - 99%)    Parameters below as of 18 Mar 2024 05:00  Patient On (Oxygen Delivery Method): room air    Constitutional: Awake, alert, in no acute distress.   HEENT: Normocephalic, atraumatic, GEORGETTE, no proptosis or lid retraction.   Neck: supple, no acanthosis, no thyromegaly or palpable thyroid nodules.  Respiratory: Lungs clear to ausculation bilaterally.   Cardiovascular: regular rhythm, normal S1 and S2, + murmur  GI: soft, non-tender, non-distended, bowel sounds present, no masses appreciated.  Extremities: No lower extremity edema, peripheral pulses present.   Skin: no rashes.   Psychiatric: AAO x 3. Normal affect/mood.     LABS  CBC - WBC/HGB/HTC/PLT: 10.87/11.3/38.0/439 (03-18-24)  BMP - Na/K/Cl/Bicarb/BUN/Cr/Gluc/AG/eGFR: 138/4.9/106/22/20/0.77/370/10/82 (03-18-24)  Ca - 9.5 (03-18-24)  Phos - -- (03-18-24)  Mg - 2.1 (03-18-24)  LFT - Alb/Tprot/Tbili/Dbili/AlkPhos/ALT/AST: 3.8/--/0.4/--/58/16/18 (03-17-24)  PT/aPTT/INR: 11.6/47.1/1.02 (03-17-24)   Thyroid Stimulating Hormone, Serum: 1.530 (03-12-24)      MEDICATIONS  MEDICATIONS  (STANDING):  acetaminophen   IVPB .. 1000 milliGRAM(s) IV Intermittent once  aspirin  chewable 81 milliGRAM(s) Oral daily  atorvastatin 40 milliGRAM(s) Oral at bedtime  chlorhexidine 0.12% Liquid 10 milliLiter(s) Swish and Spit once  chlorhexidine 4% Liquid 1 Application(s) Topical once  chlorhexidine 4% Liquid 1 Application(s) Topical once  dextrose 5%. 1000 milliLiter(s) (100 mL/Hr) IV Continuous <Continuous>  dextrose 5%. 1000 milliLiter(s) (50 mL/Hr) IV Continuous <Continuous>  dextrose 50% Injectable 25 Gram(s) IV Push once  dextrose 50% Injectable 12.5 Gram(s) IV Push once  dextrose 50% Injectable 25 Gram(s) IV Push once  gabapentin 300 milliGRAM(s) Oral once  glucagon  Injectable 1 milliGRAM(s) IntraMuscular once  heparin   Injectable 5000 Unit(s) SubCutaneous every 8 hours  insulin glargine Injectable (LANTUS) 12 Unit(s) SubCutaneous at bedtime  insulin lispro (ADMELOG) corrective regimen sliding scale   SubCutaneous three times a day before meals  insulin lispro (ADMELOG) corrective regimen sliding scale   SubCutaneous at bedtime  insulin lispro Injectable (ADMELOG) 9 Unit(s) SubCutaneous three times a day before meals  latanoprost 0.005% Ophthalmic Solution 1 Drop(s) Both EYES at bedtime  metoprolol tartrate 25 milliGRAM(s) Oral every 12 hours  mupirocin 2% Ointment 1 Application(s) Both Nostrils two times a day  pantoprazole    Tablet 40 milliGRAM(s) Oral before breakfast  polyethylene glycol 3350 17 Gram(s) Oral daily  senna 2 Tablet(s) Oral at bedtime  sodium chloride 0.9% lock flush 3 milliLiter(s) IV Push every 8 hours  timolol 0.5% Solution 1 Drop(s) Both EYES two times a day    MEDICATIONS  (PRN):  acetaminophen     Tablet .. 650 milliGRAM(s) Oral every 6 hours PRN Mild Pain (1 - 3)  bisacodyl 5 milliGRAM(s) Oral at bedtime PRN Constipation  dextrose Oral Gel 15 Gram(s) Oral once PRN Blood Glucose LESS THAN 70 milliGRAM(s)/deciliter           SUBJECTIVE / INTERVAL HPI: Patient was seen and examined this morning. Events of weekend reviewed. Insulin increase somewhat. Patient upset about consistent carb dietary restrictions, mostly juice. She "does not pay any mind to glucose. She is 73 and we all have to die of something." She has been told "by 3 people" that diet drinks have more sugar than regular. Education provided. We discussed increased risks of micro/macro vascular complications from hyperglycemia, as well as the importance of glucose control for improved wound healing and decreased infection risk postop.  She denies drinking any juice yesterday or overnight. She did not eat anything after dinner.   Of note, white count increase from 6.7 to 10.8. Afebrile. No steroids.    CAPILLARY BLOOD GLUCOSE & INSULIN RECEIVED  101 mg/dL (03-17 @ 06:52) - Lispro 12  100 mg/dL (03-17 @ 06:53)  307 mg/dL (03-17 @ 11:45) - Lispro 9+4  163 mg/dL (03-17 @ 16:44) - Lispro 9+1. Ate chicken string beans and tomatoes, potatoes, and fruit cup.  320 mg/dL (03-17 @ 21:53) - Lantus 12 + 2  370 mg/dL (03-18 @ 05:30)  364 mg/dL (03-18 @ 07:03) - Lispro 9+5. Ate english muffin. SF hot chocolate,  fruit cup.  326 mg/dL (03-18 @ lunch)      REVIEW OF SYSTEMS  Constitutional:  Negative fever, chills or loss of appetite.  Eyes:  Negative blurry vision or double vision.  Cardiovascular:  Negative for chest pain or palpitations.  Respiratory:  Negative for cough, wheezing, or shortness of breath.    Gastrointestinal:  Negative for nausea, vomiting, diarrhea, constipation, or abdominal pain.  Genitourinary:  Negative frequency, urgency or dysuria.  Neurologic:  No headache, confusion, dizziness, lightheadedness    PHYSICAL EXAM  Vital Signs Last 24 Hrs  T(C): 36.4 (18 Mar 2024 09:42), Max: 36.6 (17 Mar 2024 17:13)  T(F): 97.6 (18 Mar 2024 09:42), Max: 97.9 (17 Mar 2024 17:13)  HR: 70 (18 Mar 2024 05:00) (58 - 74)  BP: 113/56 (18 Mar 2024 05:00) (97/46 - 124/57)  BP(mean): 80 (18 Mar 2024 05:00) (66 - 82)  RR: 17 (18 Mar 2024 05:00) (16 - 18)  SpO2: 95% (18 Mar 2024 05:00) (95% - 99%)    Parameters below as of 18 Mar 2024 05:00  Patient On (Oxygen Delivery Method): room air    Constitutional: Awake, alert, in no acute distress.   HEENT: Normocephalic, atraumatic, GEORGETTE, no proptosis or lid retraction.   Neck: supple, no acanthosis, no thyromegaly or palpable thyroid nodules.  Respiratory: Lungs clear to ausculation bilaterally.   Cardiovascular: regular rhythm, normal S1 and S2, + murmur  GI: soft, non-tender, non-distended, bowel sounds present, no masses appreciated.  Extremities: No lower extremity edema, peripheral pulses present.   Skin: no rashes.   Psychiatric: AAO x 3. Normal affect/mood.     LABS  CBC - WBC/HGB/HTC/PLT: 10.87/11.3/38.0/439 (03-18-24)  BMP - Na/K/Cl/Bicarb/BUN/Cr/Gluc/AG/eGFR: 138/4.9/106/22/20/0.77/370/10/82 (03-18-24)  Ca - 9.5 (03-18-24)  Phos - -- (03-18-24)  Mg - 2.1 (03-18-24)  LFT - Alb/Tprot/Tbili/Dbili/AlkPhos/ALT/AST: 3.8/--/0.4/--/58/16/18 (03-17-24)  PT/aPTT/INR: 11.6/47.1/1.02 (03-17-24)   Thyroid Stimulating Hormone, Serum: 1.530 (03-12-24)      MEDICATIONS  MEDICATIONS  (STANDING):  acetaminophen   IVPB .. 1000 milliGRAM(s) IV Intermittent once  aspirin  chewable 81 milliGRAM(s) Oral daily  atorvastatin 40 milliGRAM(s) Oral at bedtime  chlorhexidine 0.12% Liquid 10 milliLiter(s) Swish and Spit once  chlorhexidine 4% Liquid 1 Application(s) Topical once  chlorhexidine 4% Liquid 1 Application(s) Topical once  dextrose 5%. 1000 milliLiter(s) (100 mL/Hr) IV Continuous <Continuous>  dextrose 5%. 1000 milliLiter(s) (50 mL/Hr) IV Continuous <Continuous>  dextrose 50% Injectable 25 Gram(s) IV Push once  dextrose 50% Injectable 12.5 Gram(s) IV Push once  dextrose 50% Injectable 25 Gram(s) IV Push once  gabapentin 300 milliGRAM(s) Oral once  glucagon  Injectable 1 milliGRAM(s) IntraMuscular once  heparin   Injectable 5000 Unit(s) SubCutaneous every 8 hours  insulin glargine Injectable (LANTUS) 12 Unit(s) SubCutaneous at bedtime  insulin lispro (ADMELOG) corrective regimen sliding scale   SubCutaneous three times a day before meals  insulin lispro (ADMELOG) corrective regimen sliding scale   SubCutaneous at bedtime  insulin lispro Injectable (ADMELOG) 9 Unit(s) SubCutaneous three times a day before meals  latanoprost 0.005% Ophthalmic Solution 1 Drop(s) Both EYES at bedtime  metoprolol tartrate 25 milliGRAM(s) Oral every 12 hours  mupirocin 2% Ointment 1 Application(s) Both Nostrils two times a day  pantoprazole    Tablet 40 milliGRAM(s) Oral before breakfast  polyethylene glycol 3350 17 Gram(s) Oral daily  senna 2 Tablet(s) Oral at bedtime  sodium chloride 0.9% lock flush 3 milliLiter(s) IV Push every 8 hours  timolol 0.5% Solution 1 Drop(s) Both EYES two times a day    MEDICATIONS  (PRN):  acetaminophen     Tablet .. 650 milliGRAM(s) Oral every 6 hours PRN Mild Pain (1 - 3)  bisacodyl 5 milliGRAM(s) Oral at bedtime PRN Constipation  dextrose Oral Gel 15 Gram(s) Oral once PRN Blood Glucose LESS THAN 70 milliGRAM(s)/deciliter

## 2024-03-18 NOTE — DIETITIAN INITIAL EVALUATION ADULT - EDUCATION DIETARY MODIFICATIONS
Educated on nutrition therapy for blood glucose control including sources of carbohydrates, choosing high fiber options, portion sizing, reading nutrition labels, and pairing with proteins. RD answered all questions, pt aware RD remains available for questions/concerns./(2) meets goals/outcomes/verbalization

## 2024-03-18 NOTE — PRE-ANESTHESIA EVALUATION ADULT - BMI (KG/M2)
Med Name glyburide-metformin oral tablet    Dose: 5-500 mg  Quantity:  120  Sig: take 2 tablets by mouth in the morning and 2 tablets in the evening  Pharmacy: [facility]  Ok to leave a detailed message:  Yes   22.5

## 2024-03-18 NOTE — PRE-ANESTHESIA EVALUATION ADULT - NSANTHOSAYNRD_GEN_A_CORE
No. LUIS DANIEL screening performed.  STOP BANG Legend: 0-2 = LOW Risk; 3-4 = INTERMEDIATE Risk; 5-8 = HIGH Risk

## 2024-03-18 NOTE — DIETITIAN INITIAL EVALUATION ADULT - PERTINENT LABORATORY DATA
03-18    138  |  106  |  20  ----------------------------<  370<H>  4.9   |  22  |  0.77    Ca    9.5      18 Mar 2024 05:30  Phos  4.5     03-17  Mg     2.1     03-18    TPro  6.3  /  Alb  3.8  /  TBili  0.4  /  DBili  x   /  AST  18  /  ALT  16  /  AlkPhos  58  03-17  POCT Blood Glucose.: 326 mg/dL (03-18-24 @ 11:42)  A1C with Estimated Average Glucose Result: 9.6 % (03-13-24 @ 05:30)

## 2024-03-18 NOTE — PROGRESS NOTE ADULT - SUBJECTIVE AND OBJECTIVE BOX
Patient discussed on morning rounds with Dr. Rhoades    Operation / Date: AVR/root on 3/19/24    SUBJECTIVE ASSESSMENT:  Patient feels well today.  Denies CP/SOB/N/V/D/dizziness/cough/fever/chills.  OOB w/o issues, eating w/o issues.        Vital Signs Last 24 Hrs  T(C): 36.4 (18 Mar 2024 09:42), Max: 36.6 (17 Mar 2024 17:13)  T(F): 97.6 (18 Mar 2024 09:42), Max: 97.9 (17 Mar 2024 17:13)  HR: 70 (18 Mar 2024 05:00) (58 - 74)  BP: 113/56 (18 Mar 2024 05:00) (97/46 - 124/57)  BP(mean): 80 (18 Mar 2024 05:00) (66 - 82)  RR: 17 (18 Mar 2024 05:00) (16 - 18)  SpO2: 95% (18 Mar 2024 05:00) (95% - 99%)    Parameters below as of 18 Mar 2024 05:00  Patient On (Oxygen Delivery Method): room air      I&O's Detail    17 Mar 2024 07:01  -  18 Mar 2024 07:00  --------------------------------------------------------  IN:    Oral Fluid: 720 mL  Total IN: 720 mL    OUT:  Total OUT: 0 mL    Total NET: 720 mL      PHYSICAL EXAM:    GEN: NAD, looks comfortable  Psych: Mood appropriate  Neuro: A&Ox3.  No focal deficits.  Moving all extremities.   HEENT: No obvious abnormalities  CV: S1S2, regular, +murmur  heard throughout precordium, loudest 2nd ICS on right side.  No carotid bruits.  No JVD  Lungs: Clear B/L.  No wheezing, rales or rhonchi  ABD: Soft, non-tender, non-distended.  +Bowel sounds  EXT: Warm and well perfused.  No peripheral edema noted  Musculoskeletal: Moving all extremities with normal ROM, no joint swelling  PV: Pedal pulses palpable      LABS:                        11.3   10.87 )-----------( 439      ( 18 Mar 2024 05:30 )             38.0     PT/INR - ( 17 Mar 2024 06:53 )   PT: 11.6 sec;   INR: 1.02          PTT - ( 17 Mar 2024 06:53 )  PTT:47.1 sec    03-18    138  |  106  |  20  ----------------------------<  370<H>  4.9   |  22  |  0.77    Ca    9.5      18 Mar 2024 05:30  Phos  4.5     03-17  Mg     2.1     03-18    TPro  6.3  /  Alb  3.8  /  TBili  0.4  /  DBili  x   /  AST  18  /  ALT  16  /  AlkPhos  58  03-17      Urinalysis Basic - ( 18 Mar 2024 05:30 )    Color: x / Appearance: x / SG: x / pH: x  Gluc: 370 mg/dL / Ketone: x  / Bili: x / Urobili: x   Blood: x / Protein: x / Nitrite: x   Leuk Esterase: x / RBC: x / WBC x   Sq Epi: x / Non Sq Epi: x / Bacteria: x        MEDICATIONS  (STANDING):  acetaminophen   IVPB .. 1000 milliGRAM(s) IV Intermittent once  aspirin  chewable 81 milliGRAM(s) Oral daily  atorvastatin 40 milliGRAM(s) Oral at bedtime  chlorhexidine 0.12% Liquid 10 milliLiter(s) Swish and Spit once  chlorhexidine 4% Liquid 1 Application(s) Topical once  chlorhexidine 4% Liquid 1 Application(s) Topical once  dextrose 5%. 1000 milliLiter(s) (100 mL/Hr) IV Continuous <Continuous>  dextrose 5%. 1000 milliLiter(s) (50 mL/Hr) IV Continuous <Continuous>  dextrose 50% Injectable 25 Gram(s) IV Push once  dextrose 50% Injectable 12.5 Gram(s) IV Push once  dextrose 50% Injectable 25 Gram(s) IV Push once  gabapentin 300 milliGRAM(s) Oral once  glucagon  Injectable 1 milliGRAM(s) IntraMuscular once  heparin   Injectable 5000 Unit(s) SubCutaneous every 8 hours  insulin glargine Injectable (LANTUS) 12 Unit(s) SubCutaneous at bedtime  insulin lispro (ADMELOG) corrective regimen sliding scale   SubCutaneous three times a day before meals  insulin lispro (ADMELOG) corrective regimen sliding scale   SubCutaneous at bedtime  insulin lispro Injectable (ADMELOG) 9 Unit(s) SubCutaneous three times a day before meals  latanoprost 0.005% Ophthalmic Solution 1 Drop(s) Both EYES at bedtime  metoprolol tartrate 25 milliGRAM(s) Oral every 12 hours  mupirocin 2% Ointment 1 Application(s) Both Nostrils two times a day  pantoprazole    Tablet 40 milliGRAM(s) Oral before breakfast  polyethylene glycol 3350 17 Gram(s) Oral daily  senna 2 Tablet(s) Oral at bedtime  sodium chloride 0.9% lock flush 3 milliLiter(s) IV Push every 8 hours  timolol 0.5% Solution 1 Drop(s) Both EYES two times a day    MEDICATIONS  (PRN):  acetaminophen     Tablet .. 650 milliGRAM(s) Oral every 6 hours PRN Mild Pain (1 - 3)  bisacodyl 5 milliGRAM(s) Oral at bedtime PRN Constipation  dextrose Oral Gel 15 Gram(s) Oral once PRN Blood Glucose LESS THAN 70 milliGRAM(s)/deciliter        RADIOLOGY & ADDITIONAL TESTS:    < from: TTE Echo Complete w/ Contrast w/ Doppler (03.11.24 @ 10:10) >  CONCLUSIONS:     1. Normal left and right ventricular size and systolic function.   2. Mild symmetric left ventricular hypertrophy.   3. Critically severe aortic stenosis. The peak trasnvavluar velocity is   5.33 m/s, the mean transvalvular gradient is 73 mmHg, and the LVOT/AV   ratio is 0.16. Rick aortic valve area (estimated via the continuity   method) is 0.43cm2.   4. Normal atria   5. No evidence of pulmonary hypertension.   6. No pericardial effusion.   7. Compared to the previous TTE performed on 7/3/2023, aortic valve area   has decreased.    < end of copied text >    < from: Xray Chest 1 View- PORTABLE-Routine (Xray Chest 1 View- PORTABLE-Routine in AM.) (03.17.24 @ 07:18) >    INTERPRETATION:  Clinical History: Preop    Frontal examination of the chest demonstrates the heart to be within   normal limits in transverse diameter. No acute infiltrates. Calcification   aortic knob. Visualized osseous structures are within normal limits.    IMPRESSION: No acute infiltrates    < end of copied text >    < from: CT Angio Abdomen and Pelvis w/ IV Cont (07.14.23 @ 14:08) >  IMPRESSION:  1.  Aortic measurements as noted above.  2.  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  3.  UNEXPECTED FINDING: Abnormal gallbladder wall thickening with   cholelithiasis, cannot exclude acute cholecystitis. Clinical correlation.    < end of copied text >    < from: US Duplex Carotid Arteries Complete, Bilateral (03.13.24 @ 19:27) >    Antegrade flow is noted within both vertebral arteries.    IMPRESSION: No significant hemodynamic stenosis of either carotid artery.    < end of copied text >

## 2024-03-18 NOTE — DIETITIAN INITIAL EVALUATION ADULT - NS FNS DIET ORDER
Diet, NPO after Midnight:      NPO Start Date: 18-Mar-2024,   NPO Start Time: 23:59  Except Medications     Special Instructions for Nursing:  Except Medications (03-18-24 @ 10:06)  Diet, Regular:   Supplement Feeding Modality:  Oral  Glucerna Shake Cans or Servings Per Day:  1       Frequency:  Daily     Special Instructions for Nursing:  Glucerna.  Resume for total of 7 days if started preoperatively (03-18-24 @ 10:06)

## 2024-03-18 NOTE — PRE-ANESTHESIA EVALUATION ADULT - NSANTHPROCED_GEN_ALL_CORE
Arterial Catheter/Central Venous Catheter/Transesophageal Echocardiogram Arterial Catheter/Central Venous Catheter/Pulmonary Artery Catheter/Transesophageal Echocardiogram

## 2024-03-19 ENCOUNTER — APPOINTMENT (OUTPATIENT)
Dept: CARDIOTHORACIC SURGERY | Facility: HOSPITAL | Age: 74
End: 2024-03-19

## 2024-03-19 ENCOUNTER — RESULT REVIEW (OUTPATIENT)
Age: 74
End: 2024-03-19

## 2024-03-19 LAB
ALBUMIN SERPL ELPH-MCNC: 2.7 G/DL — LOW (ref 3.3–5)
ALBUMIN SERPL ELPH-MCNC: 3.1 G/DL — LOW (ref 3.3–5)
ALBUMIN SERPL ELPH-MCNC: 3.2 G/DL — LOW (ref 3.3–5)
ALP SERPL-CCNC: 52 U/L — SIGNIFICANT CHANGE UP (ref 40–120)
ALP SERPL-CCNC: 53 U/L — SIGNIFICANT CHANGE UP (ref 40–120)
ALP SERPL-CCNC: 59 U/L — SIGNIFICANT CHANGE UP (ref 40–120)
ALT FLD-CCNC: 27 U/L — SIGNIFICANT CHANGE UP (ref 10–45)
ALT FLD-CCNC: 31 U/L — SIGNIFICANT CHANGE UP (ref 10–45)
ALT FLD-CCNC: 31 U/L — SIGNIFICANT CHANGE UP (ref 10–45)
ANION GAP SERPL CALC-SCNC: 8 MMOL/L — SIGNIFICANT CHANGE UP (ref 5–17)
ANION GAP SERPL CALC-SCNC: 8 MMOL/L — SIGNIFICANT CHANGE UP (ref 5–17)
ANION GAP SERPL CALC-SCNC: 9 MMOL/L — SIGNIFICANT CHANGE UP (ref 5–17)
APTT BLD: 29.3 SEC — SIGNIFICANT CHANGE UP (ref 24.5–35.6)
APTT BLD: 50.9 SEC — HIGH (ref 24.5–35.6)
APTT BLD: 56.9 SEC — HIGH (ref 24.5–35.6)
AST SERPL-CCNC: 71 U/L — HIGH (ref 10–40)
AST SERPL-CCNC: 86 U/L — HIGH (ref 10–40)
AST SERPL-CCNC: 88 U/L — HIGH (ref 10–40)
BASE EXCESS BLDA CALC-SCNC: -0.4 MMOL/L — SIGNIFICANT CHANGE UP (ref -2–3)
BASE EXCESS BLDA CALC-SCNC: -1.7 MMOL/L — SIGNIFICANT CHANGE UP (ref -2–3)
BASE EXCESS BLDA CALC-SCNC: 0.4 MMOL/L — SIGNIFICANT CHANGE UP (ref -2–3)
BASE EXCESS BLDA CALC-SCNC: 0.5 MMOL/L — SIGNIFICANT CHANGE UP (ref -2–3)
BASE EXCESS BLDA CALC-SCNC: 0.5 MMOL/L — SIGNIFICANT CHANGE UP (ref -2–3)
BASE EXCESS BLDA CALC-SCNC: 0.8 MMOL/L — SIGNIFICANT CHANGE UP (ref -2–3)
BASE EXCESS BLDA CALC-SCNC: 2.2 MMOL/L — SIGNIFICANT CHANGE UP (ref -2–3)
BASE EXCESS BLDA CALC-SCNC: 5.4 MMOL/L — HIGH (ref -2–3)
BASE EXCESS BLDV CALC-SCNC: 0 MMOL/L — SIGNIFICANT CHANGE UP (ref -2–3)
BASE EXCESS BLDV CALC-SCNC: 0.1 MMOL/L — SIGNIFICANT CHANGE UP (ref -2–3)
BASE EXCESS BLDV CALC-SCNC: 1.1 MMOL/L — SIGNIFICANT CHANGE UP (ref -2–3)
BASE EXCESS BLDV CALC-SCNC: 2 MMOL/L — SIGNIFICANT CHANGE UP (ref -2–3)
BASE EXCESS BLDV CALC-SCNC: 2.8 MMOL/L — SIGNIFICANT CHANGE UP (ref -2–3)
BASE EXCESS BLDV CALC-SCNC: 2.9 MMOL/L — SIGNIFICANT CHANGE UP (ref -2–3)
BASOPHILS # BLD AUTO: 0.01 K/UL — SIGNIFICANT CHANGE UP (ref 0–0.2)
BASOPHILS # BLD AUTO: 0.01 K/UL — SIGNIFICANT CHANGE UP (ref 0–0.2)
BASOPHILS # BLD AUTO: 0.03 K/UL — SIGNIFICANT CHANGE UP (ref 0–0.2)
BASOPHILS NFR BLD AUTO: 0.1 % — SIGNIFICANT CHANGE UP (ref 0–2)
BASOPHILS NFR BLD AUTO: 0.1 % — SIGNIFICANT CHANGE UP (ref 0–2)
BASOPHILS NFR BLD AUTO: 0.3 % — SIGNIFICANT CHANGE UP (ref 0–2)
BILIRUB SERPL-MCNC: 0.7 MG/DL — SIGNIFICANT CHANGE UP (ref 0.2–1.2)
BILIRUB SERPL-MCNC: 0.8 MG/DL — SIGNIFICANT CHANGE UP (ref 0.2–1.2)
BILIRUB SERPL-MCNC: 1.1 MG/DL — SIGNIFICANT CHANGE UP (ref 0.2–1.2)
BUN SERPL-MCNC: 12 MG/DL — SIGNIFICANT CHANGE UP (ref 7–23)
C PEPTIDE SERPL-MCNC: 1.8 NG/ML — SIGNIFICANT CHANGE UP (ref 1.1–4.4)
CA-I BLDA-SCNC: 0.68 MMOL/L — CRITICAL LOW (ref 1.15–1.33)
CA-I BLDA-SCNC: 0.83 MMOL/L — LOW (ref 1.15–1.33)
CA-I BLDA-SCNC: 0.83 MMOL/L — LOW (ref 1.15–1.33)
CA-I BLDA-SCNC: 0.85 MMOL/L — LOW (ref 1.15–1.33)
CA-I BLDA-SCNC: 0.9 MMOL/L — LOW (ref 1.15–1.33)
CA-I BLDA-SCNC: 1.1 MMOL/L — LOW (ref 1.15–1.33)
CA-I BLDA-SCNC: 1.13 MMOL/L — LOW (ref 1.15–1.33)
CA-I BLDA-SCNC: 1.13 MMOL/L — LOW (ref 1.15–1.33)
CA-I BLDA-SCNC: 1.2 MMOL/L — SIGNIFICANT CHANGE UP (ref 1.15–1.33)
CA-I BLDA-SCNC: 1.2 MMOL/L — SIGNIFICANT CHANGE UP (ref 1.15–1.33)
CA-I BLDA-SCNC: 1.23 MMOL/L — SIGNIFICANT CHANGE UP (ref 1.15–1.33)
CA-I SERPL-SCNC: 0.87 MMOL/L — LOW (ref 1.15–1.33)
CA-I SERPL-SCNC: 1.18 MMOL/L — SIGNIFICANT CHANGE UP (ref 1.15–1.33)
CA-I SERPL-SCNC: 1.2 MMOL/L — SIGNIFICANT CHANGE UP (ref 1.15–1.33)
CA-I SERPL-SCNC: 1.21 MMOL/L — SIGNIFICANT CHANGE UP (ref 1.15–1.33)
CA-I SERPL-SCNC: 1.22 MMOL/L — SIGNIFICANT CHANGE UP (ref 1.15–1.33)
CA-I SERPL-SCNC: 1.23 MMOL/L — SIGNIFICANT CHANGE UP (ref 1.15–1.33)
CALCIUM SERPL-MCNC: 8.2 MG/DL — LOW (ref 8.4–10.5)
CALCIUM SERPL-MCNC: 8.3 MG/DL — LOW (ref 8.4–10.5)
CALCIUM SERPL-MCNC: 8.6 MG/DL — SIGNIFICANT CHANGE UP (ref 8.4–10.5)
CHLORIDE SERPL-SCNC: 112 MMOL/L — HIGH (ref 96–108)
CHLORIDE SERPL-SCNC: 113 MMOL/L — HIGH (ref 96–108)
CHLORIDE SERPL-SCNC: 114 MMOL/L — HIGH (ref 96–108)
CO2 BLDA-SCNC: 24 MMOL/L — SIGNIFICANT CHANGE UP (ref 19–24)
CO2 BLDA-SCNC: 25 MMOL/L — HIGH (ref 19–24)
CO2 BLDA-SCNC: 26 MMOL/L — HIGH (ref 19–24)
CO2 BLDA-SCNC: 27 MMOL/L — HIGH (ref 19–24)
CO2 BLDA-SCNC: 27 MMOL/L — HIGH (ref 19–24)
CO2 BLDA-SCNC: 29 MMOL/L — HIGH (ref 19–24)
CO2 BLDA-SCNC: 30 MMOL/L — HIGH (ref 19–24)
CO2 BLDV-SCNC: 26.7 MMOL/L — HIGH (ref 22–26)
CO2 BLDV-SCNC: 27.3 MMOL/L — HIGH (ref 22–26)
CO2 BLDV-SCNC: 27.9 MMOL/L — HIGH (ref 22–26)
CO2 BLDV-SCNC: 29.2 MMOL/L — HIGH (ref 22–26)
CO2 BLDV-SCNC: 29.2 MMOL/L — HIGH (ref 22–26)
CO2 BLDV-SCNC: 29.9 MMOL/L — HIGH (ref 22–26)
CO2 SERPL-SCNC: 25 MMOL/L — SIGNIFICANT CHANGE UP (ref 22–31)
CO2 SERPL-SCNC: 26 MMOL/L — SIGNIFICANT CHANGE UP (ref 22–31)
CO2 SERPL-SCNC: 26 MMOL/L — SIGNIFICANT CHANGE UP (ref 22–31)
COHGB MFR BLDA: 0.7 % — SIGNIFICANT CHANGE UP
COHGB MFR BLDA: 0.8 % — SIGNIFICANT CHANGE UP
COHGB MFR BLDA: 0.9 % — SIGNIFICANT CHANGE UP
COHGB MFR BLDA: 1 % — SIGNIFICANT CHANGE UP
COHGB MFR BLDA: 1 % — SIGNIFICANT CHANGE UP
COHGB MFR BLDA: 1.1 % — SIGNIFICANT CHANGE UP
COHGB MFR BLDA: 1.2 % — SIGNIFICANT CHANGE UP
COHGB MFR BLDA: 1.2 % — SIGNIFICANT CHANGE UP
COHGB MFR BLDA: 1.3 % — SIGNIFICANT CHANGE UP
COHGB MFR BLDA: 1.4 % — SIGNIFICANT CHANGE UP
COHGB MFR BLDA: 1.4 % — SIGNIFICANT CHANGE UP
COHGB MFR BLDV: 1.3 % — SIGNIFICANT CHANGE UP
COHGB MFR BLDV: 1.6 % — SIGNIFICANT CHANGE UP
CREAT SERPL-MCNC: 0.62 MG/DL — SIGNIFICANT CHANGE UP (ref 0.5–1.3)
CREAT SERPL-MCNC: 0.65 MG/DL — SIGNIFICANT CHANGE UP (ref 0.5–1.3)
CREAT SERPL-MCNC: 0.67 MG/DL — SIGNIFICANT CHANGE UP (ref 0.5–1.3)
EGFR: 93 ML/MIN/1.73M2 — SIGNIFICANT CHANGE UP
EGFR: 94 ML/MIN/1.73M2 — SIGNIFICANT CHANGE UP
EGFR: 95 ML/MIN/1.73M2 — SIGNIFICANT CHANGE UP
EOSINOPHIL # BLD AUTO: 0.02 K/UL — SIGNIFICANT CHANGE UP (ref 0–0.5)
EOSINOPHIL # BLD AUTO: 0.02 K/UL — SIGNIFICANT CHANGE UP (ref 0–0.5)
EOSINOPHIL # BLD AUTO: 0.11 K/UL — SIGNIFICANT CHANGE UP (ref 0–0.5)
EOSINOPHIL NFR BLD AUTO: 0.2 % — SIGNIFICANT CHANGE UP (ref 0–6)
EOSINOPHIL NFR BLD AUTO: 0.2 % — SIGNIFICANT CHANGE UP (ref 0–6)
EOSINOPHIL NFR BLD AUTO: 1 % — SIGNIFICANT CHANGE UP (ref 0–6)
GAS PNL BLDA: SIGNIFICANT CHANGE UP
GAS PNL BLDV: 139 MMOL/L — SIGNIFICANT CHANGE UP (ref 136–145)
GAS PNL BLDV: 141 MMOL/L — SIGNIFICANT CHANGE UP (ref 136–145)
GAS PNL BLDV: 144 MMOL/L — SIGNIFICANT CHANGE UP (ref 136–145)
GAS PNL BLDV: 145 MMOL/L — SIGNIFICANT CHANGE UP (ref 136–145)
GAS PNL BLDV: 145 MMOL/L — SIGNIFICANT CHANGE UP (ref 136–145)
GAS PNL BLDV: 147 MMOL/L — HIGH (ref 136–145)
GAS PNL BLDV: SIGNIFICANT CHANGE UP
GLUCOSE BLDA-MCNC: 129 MG/DL — HIGH (ref 70–99)
GLUCOSE BLDA-MCNC: 147 MG/DL — HIGH (ref 70–99)
GLUCOSE BLDA-MCNC: 159 MG/DL — HIGH (ref 70–99)
GLUCOSE BLDA-MCNC: 170 MG/DL — HIGH (ref 70–99)
GLUCOSE BLDA-MCNC: 170 MG/DL — HIGH (ref 70–99)
GLUCOSE BLDA-MCNC: 173 MG/DL — HIGH (ref 70–99)
GLUCOSE BLDA-MCNC: 174 MG/DL — HIGH (ref 70–99)
GLUCOSE BLDA-MCNC: 184 MG/DL — HIGH (ref 70–99)
GLUCOSE BLDA-MCNC: 184 MG/DL — HIGH (ref 70–99)
GLUCOSE BLDA-MCNC: 190 MG/DL — HIGH (ref 70–99)
GLUCOSE BLDA-MCNC: 213 MG/DL — HIGH (ref 70–99)
GLUCOSE BLDC GLUCOMTR-MCNC: 150 MG/DL — HIGH (ref 70–99)
GLUCOSE BLDC GLUCOMTR-MCNC: 161 MG/DL — HIGH (ref 70–99)
GLUCOSE BLDC GLUCOMTR-MCNC: 170 MG/DL — HIGH (ref 70–99)
GLUCOSE BLDC GLUCOMTR-MCNC: 198 MG/DL — HIGH (ref 70–99)
GLUCOSE BLDC GLUCOMTR-MCNC: 232 MG/DL — HIGH (ref 70–99)
GLUCOSE BLDC GLUCOMTR-MCNC: 250 MG/DL — HIGH (ref 70–99)
GLUCOSE BLDC GLUCOMTR-MCNC: 250 MG/DL — HIGH (ref 70–99)
GLUCOSE BLDV-MCNC: 130 MG/DL — HIGH (ref 70–99)
GLUCOSE BLDV-MCNC: 144 MG/DL — HIGH (ref 70–99)
GLUCOSE SERPL-MCNC: 153 MG/DL — HIGH (ref 70–99)
GLUCOSE SERPL-MCNC: 210 MG/DL — HIGH (ref 70–99)
GLUCOSE SERPL-MCNC: 215 MG/DL — HIGH (ref 70–99)
HCO3 BLDA-SCNC: 23 MMOL/L — SIGNIFICANT CHANGE UP (ref 21–28)
HCO3 BLDA-SCNC: 24 MMOL/L — SIGNIFICANT CHANGE UP (ref 21–28)
HCO3 BLDA-SCNC: 25 MMOL/L — SIGNIFICANT CHANGE UP (ref 21–28)
HCO3 BLDA-SCNC: 25 MMOL/L — SIGNIFICANT CHANGE UP (ref 21–28)
HCO3 BLDA-SCNC: 26 MMOL/L — SIGNIFICANT CHANGE UP (ref 21–28)
HCO3 BLDA-SCNC: 27 MMOL/L — SIGNIFICANT CHANGE UP (ref 21–28)
HCO3 BLDA-SCNC: 29 MMOL/L — HIGH (ref 21–28)
HCO3 BLDV-SCNC: 25 MMOL/L — SIGNIFICANT CHANGE UP (ref 22–29)
HCO3 BLDV-SCNC: 26 MMOL/L — SIGNIFICANT CHANGE UP (ref 22–29)
HCO3 BLDV-SCNC: 26 MMOL/L — SIGNIFICANT CHANGE UP (ref 22–29)
HCO3 BLDV-SCNC: 28 MMOL/L — SIGNIFICANT CHANGE UP (ref 22–29)
HCT VFR BLD CALC: 30.8 % — LOW (ref 34.5–45)
HCT VFR BLD CALC: 31.7 % — LOW (ref 34.5–45)
HCT VFR BLD CALC: 32.9 % — LOW (ref 34.5–45)
HGB BLD CALC-MCNC: 7.9 G/DL — LOW (ref 11.7–16.1)
HGB BLD CALC-MCNC: 9.8 G/DL — LOW (ref 11.7–16.1)
HGB BLD-MCNC: 10.1 G/DL — LOW (ref 11.5–15.5)
HGB BLD-MCNC: 10.2 G/DL — LOW (ref 11.5–15.5)
HGB BLD-MCNC: 10.8 G/DL — LOW (ref 11.5–15.5)
HGB BLDA-MCNC: 10.1 G/DL — LOW (ref 11.7–16.1)
HGB BLDA-MCNC: 6.8 G/DL — CRITICAL LOW (ref 11.7–16.1)
HGB BLDA-MCNC: 7.3 G/DL — LOW (ref 11.7–16.1)
HGB BLDA-MCNC: 7.5 G/DL — LOW (ref 11.7–16.1)
HGB BLDA-MCNC: 7.7 G/DL — LOW (ref 11.7–16.1)
HGB BLDA-MCNC: 8.1 G/DL — LOW (ref 11.7–16.1)
HGB BLDA-MCNC: 8.5 G/DL — LOW (ref 11.7–16.1)
HGB BLDA-MCNC: 8.6 G/DL — LOW (ref 11.7–16.1)
HGB BLDA-MCNC: 8.8 G/DL — LOW (ref 11.7–16.1)
HGB BLDA-MCNC: 9.5 G/DL — LOW (ref 11.7–16.1)
HGB BLDA-MCNC: 9.6 G/DL — LOW (ref 11.7–16.1)
IMM GRANULOCYTES NFR BLD AUTO: 1 % — HIGH (ref 0–0.9)
IMM GRANULOCYTES NFR BLD AUTO: 1.3 % — HIGH (ref 0–0.9)
IMM GRANULOCYTES NFR BLD AUTO: 1.5 % — HIGH (ref 0–0.9)
INR BLD: 1.05 — SIGNIFICANT CHANGE UP (ref 0.85–1.18)
INR BLD: 1.07 — SIGNIFICANT CHANGE UP (ref 0.85–1.18)
INR BLD: 1.07 — SIGNIFICANT CHANGE UP (ref 0.85–1.18)
ISTAT ARTERIAL BE: 1 MMOL/L — SIGNIFICANT CHANGE UP (ref -2–3)
ISTAT ARTERIAL GLUCOSE: 208 MG/DL — HIGH (ref 70–99)
ISTAT ARTERIAL HCO3: 26 MMOL/L — SIGNIFICANT CHANGE UP (ref 22–26)
ISTAT ARTERIAL HEMATOCRIT: 24 % — LOW (ref 34.5–45)
ISTAT ARTERIAL HEMOGLOBIN: 8.2 G/DL — LOW (ref 11.5–15.5)
ISTAT ARTERIAL IONIZED CALCIUM: 1.14 MMOL/L — SIGNIFICANT CHANGE UP (ref 1.12–1.3)
ISTAT ARTERIAL PCO2: 42 MMHG — SIGNIFICANT CHANGE UP (ref 35–45)
ISTAT ARTERIAL PH: 7.4 — SIGNIFICANT CHANGE UP (ref 7.35–7.45)
ISTAT ARTERIAL PO2: 168 MMHG — HIGH (ref 80–105)
ISTAT ARTERIAL POTASSIUM: 4.3 MMOL/L — SIGNIFICANT CHANGE UP (ref 3.5–5.3)
ISTAT ARTERIAL SO2: 100 % — HIGH (ref 95–98)
ISTAT ARTERIAL SODIUM: 147 MMOL/L — HIGH (ref 135–145)
ISTAT ARTERIAL TCO2: 27 MMOL/L — SIGNIFICANT CHANGE UP (ref 22–31)
LACTATE SERPL-SCNC: 1.4 MMOL/L — SIGNIFICANT CHANGE UP (ref 0.5–2)
LACTATE SERPL-SCNC: 1.6 MMOL/L — SIGNIFICANT CHANGE UP (ref 0.5–2)
LACTATE SERPL-SCNC: 2.9 MMOL/L — HIGH (ref 0.5–2)
LYMPHOCYTES # BLD AUTO: 0.91 K/UL — LOW (ref 1–3.3)
LYMPHOCYTES # BLD AUTO: 1.12 K/UL — SIGNIFICANT CHANGE UP (ref 1–3.3)
LYMPHOCYTES # BLD AUTO: 1.28 K/UL — SIGNIFICANT CHANGE UP (ref 1–3.3)
LYMPHOCYTES # BLD AUTO: 10.4 % — LOW (ref 13–44)
LYMPHOCYTES # BLD AUTO: 12.1 % — LOW (ref 13–44)
LYMPHOCYTES # BLD AUTO: 12.2 % — LOW (ref 13–44)
MAGNESIUM SERPL-MCNC: 2.7 MG/DL — HIGH (ref 1.6–2.6)
MAGNESIUM SERPL-MCNC: 2.9 MG/DL — HIGH (ref 1.6–2.6)
MAGNESIUM SERPL-MCNC: 3.5 MG/DL — HIGH (ref 1.6–2.6)
MCHC RBC-ENTMCNC: 27.4 PG — SIGNIFICANT CHANGE UP (ref 27–34)
MCHC RBC-ENTMCNC: 27.4 PG — SIGNIFICANT CHANGE UP (ref 27–34)
MCHC RBC-ENTMCNC: 27.5 PG — SIGNIFICANT CHANGE UP (ref 27–34)
MCHC RBC-ENTMCNC: 32.2 GM/DL — SIGNIFICANT CHANGE UP (ref 32–36)
MCHC RBC-ENTMCNC: 32.8 GM/DL — SIGNIFICANT CHANGE UP (ref 32–36)
MCHC RBC-ENTMCNC: 32.8 GM/DL — SIGNIFICANT CHANGE UP (ref 32–36)
MCV RBC AUTO: 83.5 FL — SIGNIFICANT CHANGE UP (ref 80–100)
MCV RBC AUTO: 83.7 FL — SIGNIFICANT CHANGE UP (ref 80–100)
MCV RBC AUTO: 85.4 FL — SIGNIFICANT CHANGE UP (ref 80–100)
METHGB MFR BLDA: 0.4 % — SIGNIFICANT CHANGE UP
METHGB MFR BLDA: 0.5 % — SIGNIFICANT CHANGE UP
METHGB MFR BLDA: 0.7 % — SIGNIFICANT CHANGE UP
METHGB MFR BLDA: 0.7 % — SIGNIFICANT CHANGE UP
METHGB MFR BLDA: 0.8 % — SIGNIFICANT CHANGE UP
METHGB MFR BLDA: 0.8 % — SIGNIFICANT CHANGE UP
METHGB MFR BLDA: 0.9 % — SIGNIFICANT CHANGE UP
METHGB MFR BLDA: 1 % — SIGNIFICANT CHANGE UP
METHGB MFR BLDA: 1.2 % — SIGNIFICANT CHANGE UP
METHGB MFR BLDV: 0.5 % — SIGNIFICANT CHANGE UP
METHGB MFR BLDV: 1.2 % — SIGNIFICANT CHANGE UP
MONOCYTES # BLD AUTO: 0.76 K/UL — SIGNIFICANT CHANGE UP (ref 0–0.9)
MONOCYTES # BLD AUTO: 1 K/UL — HIGH (ref 0–0.9)
MONOCYTES # BLD AUTO: 1.21 K/UL — HIGH (ref 0–0.9)
MONOCYTES NFR BLD AUTO: 10.8 % — SIGNIFICANT CHANGE UP (ref 2–14)
MONOCYTES NFR BLD AUTO: 11.5 % — SIGNIFICANT CHANGE UP (ref 2–14)
MONOCYTES NFR BLD AUTO: 8.7 % — SIGNIFICANT CHANGE UP (ref 2–14)
NEUTROPHILS # BLD AUTO: 6.93 K/UL — SIGNIFICANT CHANGE UP (ref 1.8–7.4)
NEUTROPHILS # BLD AUTO: 6.98 K/UL — SIGNIFICANT CHANGE UP (ref 1.8–7.4)
NEUTROPHILS # BLD AUTO: 7.74 K/UL — HIGH (ref 1.8–7.4)
NEUTROPHILS NFR BLD AUTO: 73.7 % — SIGNIFICANT CHANGE UP (ref 43–77)
NEUTROPHILS NFR BLD AUTO: 75.8 % — SIGNIFICANT CHANGE UP (ref 43–77)
NEUTROPHILS NFR BLD AUTO: 79.1 % — HIGH (ref 43–77)
NRBC # BLD: 0 /100 WBCS — SIGNIFICANT CHANGE UP (ref 0–0)
OXYHGB MFR BLDA: 97.5 % — HIGH (ref 90–95)
OXYHGB MFR BLDA: 97.5 % — HIGH (ref 90–95)
OXYHGB MFR BLDA: 97.6 % — HIGH (ref 90–95)
OXYHGB MFR BLDA: 97.7 % — HIGH (ref 90–95)
OXYHGB MFR BLDA: 97.8 % — HIGH (ref 90–95)
OXYHGB MFR BLDA: 98.1 % — HIGH (ref 90–95)
OXYHGB MFR BLDA: 98.1 % — HIGH (ref 90–95)
OXYHGB MFR BLDA: 98.2 % — HIGH (ref 90–95)
OXYHGB MFR BLDA: 98.2 % — HIGH (ref 90–95)
PCO2 BLDA: 31 MMHG — LOW (ref 32–45)
PCO2 BLDA: 34 MMHG — SIGNIFICANT CHANGE UP (ref 32–45)
PCO2 BLDA: 36 MMHG — SIGNIFICANT CHANGE UP (ref 32–45)
PCO2 BLDA: 36 MMHG — SIGNIFICANT CHANGE UP (ref 32–45)
PCO2 BLDA: 37 MMHG — SIGNIFICANT CHANGE UP (ref 32–45)
PCO2 BLDA: 38 MMHG — SIGNIFICANT CHANGE UP (ref 32–45)
PCO2 BLDA: 38 MMHG — SIGNIFICANT CHANGE UP (ref 32–45)
PCO2 BLDA: 39 MMHG — SIGNIFICANT CHANGE UP (ref 32–45)
PCO2 BLDA: 41 MMHG — SIGNIFICANT CHANGE UP (ref 32–45)
PCO2 BLDA: 44 MMHG — SIGNIFICANT CHANGE UP (ref 32–45)
PCO2 BLDA: 46 MMHG — HIGH (ref 32–45)
PCO2 BLDV: 42 MMHG — SIGNIFICANT CHANGE UP (ref 39–52)
PCO2 BLDV: 43 MMHG — HIGH (ref 39–42)
PCO2 BLDV: 43 MMHG — SIGNIFICANT CHANGE UP (ref 39–52)
PCO2 BLDV: 47 MMHG — HIGH (ref 39–42)
PCO2 BLDV: 47 MMHG — HIGH (ref 39–42)
PCO2 BLDV: 49 MMHG — HIGH (ref 39–42)
PH BLDA: 7.36 — SIGNIFICANT CHANGE UP (ref 7.35–7.45)
PH BLDA: 7.39 — SIGNIFICANT CHANGE UP (ref 7.35–7.45)
PH BLDA: 7.4 — SIGNIFICANT CHANGE UP (ref 7.35–7.45)
PH BLDA: 7.4 — SIGNIFICANT CHANGE UP (ref 7.35–7.45)
PH BLDA: 7.41 — SIGNIFICANT CHANGE UP (ref 7.35–7.45)
PH BLDA: 7.42 — SIGNIFICANT CHANGE UP (ref 7.35–7.45)
PH BLDA: 7.44 — SIGNIFICANT CHANGE UP (ref 7.35–7.45)
PH BLDA: 7.44 — SIGNIFICANT CHANGE UP (ref 7.35–7.45)
PH BLDA: 7.46 — HIGH (ref 7.35–7.45)
PH BLDA: 7.49 — HIGH (ref 7.35–7.45)
PH BLDA: 7.5 — HIGH (ref 7.35–7.45)
PH BLDV: 7.34 — SIGNIFICANT CHANGE UP (ref 7.32–7.43)
PH BLDV: 7.38 — SIGNIFICANT CHANGE UP (ref 7.32–7.43)
PH BLDV: 7.38 — SIGNIFICANT CHANGE UP (ref 7.32–7.43)
PH BLDV: 7.39 — SIGNIFICANT CHANGE UP (ref 7.32–7.43)
PH BLDV: 7.4 — SIGNIFICANT CHANGE UP (ref 7.32–7.43)
PH BLDV: 7.42 — SIGNIFICANT CHANGE UP (ref 7.32–7.43)
PHOSPHATE SERPL-MCNC: 1.9 MG/DL — LOW (ref 2.5–4.5)
PHOSPHATE SERPL-MCNC: 2.2 MG/DL — LOW (ref 2.5–4.5)
PHOSPHATE SERPL-MCNC: 3.2 MG/DL — SIGNIFICANT CHANGE UP (ref 2.5–4.5)
PLATELET # BLD AUTO: 212 K/UL — SIGNIFICANT CHANGE UP (ref 150–400)
PLATELET # BLD AUTO: 227 K/UL — SIGNIFICANT CHANGE UP (ref 150–400)
PLATELET # BLD AUTO: 233 K/UL — SIGNIFICANT CHANGE UP (ref 150–400)
PO2 BLDA: 429 MMHG — HIGH (ref 83–108)
PO2 BLDA: 434 MMHG — HIGH (ref 83–108)
PO2 BLDA: 440 MMHG — HIGH (ref 83–108)
PO2 BLDA: 443 MMHG — HIGH (ref 83–108)
PO2 BLDA: 446 MMHG — HIGH (ref 83–108)
PO2 BLDA: 449 MMHG — HIGH (ref 83–108)
PO2 BLDA: 459 MMHG — HIGH (ref 83–108)
PO2 BLDA: 460 MMHG — HIGH (ref 83–108)
PO2 BLDA: 486 MMHG — HIGH (ref 83–108)
PO2 BLDA: 533 MMHG — HIGH (ref 83–108)
PO2 BLDA: 572 MMHG — HIGH (ref 83–108)
PO2 BLDV: 36 MMHG — SIGNIFICANT CHANGE UP (ref 25–45)
PO2 BLDV: 40 MMHG — SIGNIFICANT CHANGE UP (ref 25–45)
PO2 BLDV: 41 MMHG — SIGNIFICANT CHANGE UP (ref 25–45)
PO2 BLDV: 42 MMHG — SIGNIFICANT CHANGE UP (ref 25–45)
PO2 BLDV: 54 MMHG — HIGH (ref 25–45)
PO2 BLDV: 60 MMHG — HIGH (ref 25–45)
POTASSIUM BLDA-SCNC: 4.1 MMOL/L — SIGNIFICANT CHANGE UP (ref 3.5–5.1)
POTASSIUM BLDA-SCNC: 4.4 MMOL/L — SIGNIFICANT CHANGE UP (ref 3.5–5.1)
POTASSIUM BLDA-SCNC: 4.5 MMOL/L — SIGNIFICANT CHANGE UP (ref 3.5–5.1)
POTASSIUM BLDA-SCNC: 4.9 MMOL/L — SIGNIFICANT CHANGE UP (ref 3.5–5.1)
POTASSIUM BLDA-SCNC: 5.1 MMOL/L — SIGNIFICANT CHANGE UP (ref 3.5–5.1)
POTASSIUM BLDA-SCNC: 6.1 MMOL/L — HIGH (ref 3.5–5.1)
POTASSIUM BLDA-SCNC: 6.4 MMOL/L — CRITICAL HIGH (ref 3.5–5.1)
POTASSIUM BLDA-SCNC: 6.4 MMOL/L — CRITICAL HIGH (ref 3.5–5.1)
POTASSIUM BLDA-SCNC: 6.6 MMOL/L — CRITICAL HIGH (ref 3.5–5.1)
POTASSIUM BLDA-SCNC: 6.8 MMOL/L — CRITICAL HIGH (ref 3.5–5.1)
POTASSIUM BLDA-SCNC: 7 MMOL/L — CRITICAL HIGH (ref 3.5–5.1)
POTASSIUM BLDV-SCNC: 3.3 MMOL/L — LOW (ref 3.5–5.1)
POTASSIUM BLDV-SCNC: 3.6 MMOL/L — SIGNIFICANT CHANGE UP (ref 3.5–5.1)
POTASSIUM BLDV-SCNC: 3.9 MMOL/L — SIGNIFICANT CHANGE UP (ref 3.5–5.1)
POTASSIUM BLDV-SCNC: 4.4 MMOL/L — SIGNIFICANT CHANGE UP (ref 3.5–5.1)
POTASSIUM BLDV-SCNC: 4.4 MMOL/L — SIGNIFICANT CHANGE UP (ref 3.5–5.1)
POTASSIUM BLDV-SCNC: 6.5 MMOL/L — CRITICAL HIGH (ref 3.5–5.1)
POTASSIUM SERPL-MCNC: 4 MMOL/L — SIGNIFICANT CHANGE UP (ref 3.5–5.3)
POTASSIUM SERPL-MCNC: 4 MMOL/L — SIGNIFICANT CHANGE UP (ref 3.5–5.3)
POTASSIUM SERPL-MCNC: 4.6 MMOL/L — SIGNIFICANT CHANGE UP (ref 3.5–5.3)
POTASSIUM SERPL-SCNC: 4 MMOL/L — SIGNIFICANT CHANGE UP (ref 3.5–5.3)
POTASSIUM SERPL-SCNC: 4 MMOL/L — SIGNIFICANT CHANGE UP (ref 3.5–5.3)
POTASSIUM SERPL-SCNC: 4.6 MMOL/L — SIGNIFICANT CHANGE UP (ref 3.5–5.3)
PROT SERPL-MCNC: 4.7 G/DL — LOW (ref 6–8.3)
PROT SERPL-MCNC: 4.8 G/DL — LOW (ref 6–8.3)
PROT SERPL-MCNC: 5.1 G/DL — LOW (ref 6–8.3)
PROTHROM AB SERPL-ACNC: 12 SEC — SIGNIFICANT CHANGE UP (ref 9.5–13)
PROTHROM AB SERPL-ACNC: 12.2 SEC — SIGNIFICANT CHANGE UP (ref 9.5–13)
PROTHROM AB SERPL-ACNC: 12.2 SEC — SIGNIFICANT CHANGE UP (ref 9.5–13)
RBC # BLD: 3.68 M/UL — LOW (ref 3.8–5.2)
RBC # BLD: 3.71 M/UL — LOW (ref 3.8–5.2)
RBC # BLD: 3.94 M/UL — SIGNIFICANT CHANGE UP (ref 3.8–5.2)
RBC # FLD: 13.4 % — SIGNIFICANT CHANGE UP (ref 10.3–14.5)
RBC # FLD: 13.4 % — SIGNIFICANT CHANGE UP (ref 10.3–14.5)
RBC # FLD: 13.5 % — SIGNIFICANT CHANGE UP (ref 10.3–14.5)
SAO2 % BLDA: 100 % — HIGH (ref 94–98)
SAO2 % BLDA: 100 % — HIGH (ref 94–98)
SAO2 % BLDA: 99.1 % — HIGH (ref 94–98)
SAO2 % BLDA: 99.4 % — HIGH (ref 94–98)
SAO2 % BLDA: 99.5 % — HIGH (ref 94–98)
SAO2 % BLDA: 99.5 % — HIGH (ref 94–98)
SAO2 % BLDA: 99.6 % — HIGH (ref 94–98)
SAO2 % BLDA: 99.6 % — HIGH (ref 94–98)
SAO2 % BLDA: 99.7 % — HIGH (ref 94–98)
SAO2 % BLDA: 99.8 % — HIGH (ref 94–98)
SAO2 % BLDA: 99.9 % — HIGH (ref 94–98)
SAO2 % BLDV: 66 % — LOW (ref 67–88)
SAO2 % BLDV: 72.5 % — SIGNIFICANT CHANGE UP (ref 67–88)
SAO2 % BLDV: 73.7 % — SIGNIFICANT CHANGE UP (ref 67–88)
SAO2 % BLDV: 75.4 % — SIGNIFICANT CHANGE UP (ref 67–88)
SAO2 % BLDV: 86 % — SIGNIFICANT CHANGE UP (ref 67–88)
SAO2 % BLDV: 93 % — HIGH (ref 67–88)
SODIUM BLDA-SCNC: 137 MMOL/L — SIGNIFICANT CHANGE UP (ref 136–145)
SODIUM BLDA-SCNC: 138 MMOL/L — SIGNIFICANT CHANGE UP (ref 136–145)
SODIUM BLDA-SCNC: 139 MMOL/L — SIGNIFICANT CHANGE UP (ref 136–145)
SODIUM BLDA-SCNC: 140 MMOL/L — SIGNIFICANT CHANGE UP (ref 136–145)
SODIUM BLDA-SCNC: 140 MMOL/L — SIGNIFICANT CHANGE UP (ref 136–145)
SODIUM BLDA-SCNC: 141 MMOL/L — SIGNIFICANT CHANGE UP (ref 136–145)
SODIUM BLDA-SCNC: 142 MMOL/L — SIGNIFICANT CHANGE UP (ref 136–145)
SODIUM BLDA-SCNC: 145 MMOL/L — SIGNIFICANT CHANGE UP (ref 136–145)
SODIUM BLDA-SCNC: 145 MMOL/L — SIGNIFICANT CHANGE UP (ref 136–145)
SODIUM BLDA-SCNC: 146 MMOL/L — HIGH (ref 136–145)
SODIUM BLDA-SCNC: 146 MMOL/L — HIGH (ref 136–145)
SODIUM SERPL-SCNC: 146 MMOL/L — HIGH (ref 135–145)
SODIUM SERPL-SCNC: 147 MMOL/L — HIGH (ref 135–145)
SODIUM SERPL-SCNC: 148 MMOL/L — HIGH (ref 135–145)
WBC # BLD: 10.51 K/UL — HIGH (ref 3.8–10.5)
WBC # BLD: 8.76 K/UL — SIGNIFICANT CHANGE UP (ref 3.8–10.5)
WBC # BLD: 9.22 K/UL — SIGNIFICANT CHANGE UP (ref 3.8–10.5)
WBC # FLD AUTO: 10.51 K/UL — HIGH (ref 3.8–10.5)
WBC # FLD AUTO: 8.76 K/UL — SIGNIFICANT CHANGE UP (ref 3.8–10.5)
WBC # FLD AUTO: 9.22 K/UL — SIGNIFICANT CHANGE UP (ref 3.8–10.5)

## 2024-03-19 PROCEDURE — 71045 X-RAY EXAM CHEST 1 VIEW: CPT | Mod: 26

## 2024-03-19 PROCEDURE — 99291 CRITICAL CARE FIRST HOUR: CPT

## 2024-03-19 PROCEDURE — 33863 ASCENDING AORTIC GRAFT: CPT | Mod: AS

## 2024-03-19 PROCEDURE — 93010 ELECTROCARDIOGRAM REPORT: CPT

## 2024-03-19 PROCEDURE — 33863 ASCENDING AORTIC GRAFT: CPT

## 2024-03-19 PROCEDURE — 88305 TISSUE EXAM BY PATHOLOGIST: CPT | Mod: 26

## 2024-03-19 DEVICE — CANNULA CORONARY OSTIAL 12FR X 6" BASKET TIP: Type: IMPLANTABLE DEVICE | Status: FUNCTIONAL

## 2024-03-19 DEVICE — CHEST DRAIN THORACIC PVC 32FR: Type: IMPLANTABLE DEVICE | Status: FUNCTIONAL

## 2024-03-19 DEVICE — CANNULA CORONARY STR SELF INFLATING 3.0MM: Type: IMPLANTABLE DEVICE | Status: FUNCTIONAL

## 2024-03-19 DEVICE — INTRODUCER PERCUTANEOUS INSERTION KIT: Type: IMPLANTABLE DEVICE | Status: FUNCTIONAL

## 2024-03-19 DEVICE — PACING WIRE STREAMLINE BIPOLAR MYOCARDIAL: Type: IMPLANTABLE DEVICE | Status: FUNCTIONAL

## 2024-03-19 DEVICE — IMPLANTABLE DEVICE: Type: IMPLANTABLE DEVICE | Status: FUNCTIONAL

## 2024-03-19 DEVICE — TISSEEL 4ML: Type: IMPLANTABLE DEVICE | Status: FUNCTIONAL

## 2024-03-19 DEVICE — CANNULA RETROGRADE CARDIOPLEGIA SELF-INFLATING 14FR PRE-SHAPED STYLET/HANDLE: Type: IMPLANTABLE DEVICE | Status: FUNCTIONAL

## 2024-03-19 DEVICE — FELT PTFE 1 X 6": Type: IMPLANTABLE DEVICE | Status: FUNCTIONAL

## 2024-03-19 DEVICE — PATCH PERI-GUARD RPR 6X8CM: Type: IMPLANTABLE DEVICE | Status: FUNCTIONAL

## 2024-03-19 DEVICE — CANNULA AORTIC ROOT 14G X 14CM FLANGED: Type: IMPLANTABLE DEVICE | Status: FUNCTIONAL

## 2024-03-19 DEVICE — LIGATING CLIPS WECK HEMOCLIP TANTALUM LARGE (ORANGE) 10: Type: IMPLANTABLE DEVICE | Status: FUNCTIONAL

## 2024-03-19 DEVICE — SURGICEL FIBRILLAR 4 X 4": Type: IMPLANTABLE DEVICE | Status: FUNCTIONAL

## 2024-03-19 DEVICE — BIOGLUE 10ML SYR: Type: IMPLANTABLE DEVICE | Status: FUNCTIONAL

## 2024-03-19 DEVICE — VALVE AORTIC INSPIRIS RESILIA 21MM: Type: IMPLANTABLE DEVICE | Status: FUNCTIONAL

## 2024-03-19 DEVICE — COR-KNOT MINI DEVICE COMBO KIT: Type: IMPLANTABLE DEVICE | Status: FUNCTIONAL

## 2024-03-19 DEVICE — CATH VENT LEFT HEART SILICONE 16FR NON-VENTED: Type: IMPLANTABLE DEVICE | Status: FUNCTIONAL

## 2024-03-19 DEVICE — SURGIFLO HEMOSTATIC MATRIX KIT: Type: IMPLANTABLE DEVICE | Status: FUNCTIONAL

## 2024-03-19 DEVICE — CHEST DRAIN THORACIC PVC 32FR RIGHT ANGLE: Type: IMPLANTABLE DEVICE | Status: FUNCTIONAL

## 2024-03-19 DEVICE — KIT CVC 3LUM SPECTRUM 9FR: Type: IMPLANTABLE DEVICE | Status: FUNCTIONAL

## 2024-03-19 DEVICE — CANNULA ARTERIAL OPTISITE 18FR X 3/8" VENTED: Type: IMPLANTABLE DEVICE | Status: FUNCTIONAL

## 2024-03-19 DEVICE — CANNULA PERFUSION  BALLOON 6MM: Type: IMPLANTABLE DEVICE | Status: FUNCTIONAL

## 2024-03-19 DEVICE — COR-KNOT QUICK LOAD SINGLES: Type: IMPLANTABLE DEVICE | Status: FUNCTIONAL

## 2024-03-19 DEVICE — CATH VENOUS CONN 29/37FRX15X0.5IN: Type: IMPLANTABLE DEVICE | Status: FUNCTIONAL

## 2024-03-19 RX ORDER — DEXMEDETOMIDINE HYDROCHLORIDE IN 0.9% SODIUM CHLORIDE 4 UG/ML
0.2 INJECTION INTRAVENOUS
Qty: 400 | Refills: 0 | Status: DISCONTINUED | OUTPATIENT
Start: 2024-03-19 | End: 2024-03-19

## 2024-03-19 RX ORDER — OXYCODONE HYDROCHLORIDE 5 MG/1
5 TABLET ORAL EVERY 6 HOURS
Refills: 0 | Status: DISCONTINUED | OUTPATIENT
Start: 2024-03-19 | End: 2024-03-25

## 2024-03-19 RX ORDER — ASCORBIC ACID 60 MG
500 TABLET,CHEWABLE ORAL
Refills: 0 | Status: COMPLETED | OUTPATIENT
Start: 2024-03-19 | End: 2024-03-24

## 2024-03-19 RX ORDER — SODIUM CHLORIDE 9 MG/ML
1000 INJECTION INTRAMUSCULAR; INTRAVENOUS; SUBCUTANEOUS
Refills: 0 | Status: DISCONTINUED | OUTPATIENT
Start: 2024-03-19 | End: 2024-03-29

## 2024-03-19 RX ORDER — DEXTROSE 50 % IN WATER 50 %
50 SYRINGE (ML) INTRAVENOUS
Refills: 0 | Status: DISCONTINUED | OUTPATIENT
Start: 2024-03-19 | End: 2024-03-31

## 2024-03-19 RX ORDER — PANTOPRAZOLE SODIUM 20 MG/1
40 TABLET, DELAYED RELEASE ORAL DAILY
Refills: 0 | Status: DISCONTINUED | OUTPATIENT
Start: 2024-03-19 | End: 2024-03-19

## 2024-03-19 RX ORDER — ALBUMIN HUMAN 25 %
250 VIAL (ML) INTRAVENOUS ONCE
Refills: 0 | Status: COMPLETED | OUTPATIENT
Start: 2024-03-19 | End: 2024-03-19

## 2024-03-19 RX ORDER — VASOPRESSIN 20 [USP'U]/ML
0.02 INJECTION INTRAVENOUS
Qty: 40 | Refills: 0 | Status: DISCONTINUED | OUTPATIENT
Start: 2024-03-19 | End: 2024-03-20

## 2024-03-19 RX ORDER — POLYETHYLENE GLYCOL 3350 17 G/17G
17 POWDER, FOR SOLUTION ORAL DAILY
Refills: 0 | Status: DISCONTINUED | OUTPATIENT
Start: 2024-03-20 | End: 2024-03-31

## 2024-03-19 RX ORDER — HEPARIN SODIUM 5000 [USP'U]/ML
5000 INJECTION INTRAVENOUS; SUBCUTANEOUS EVERY 8 HOURS
Refills: 0 | Status: DISCONTINUED | OUTPATIENT
Start: 2024-03-19 | End: 2024-03-29

## 2024-03-19 RX ORDER — FENTANYL CITRATE 50 UG/ML
25 INJECTION INTRAVENOUS EVERY 4 HOURS
Refills: 0 | Status: DISCONTINUED | OUTPATIENT
Start: 2024-03-19 | End: 2024-03-19

## 2024-03-19 RX ORDER — CHLORHEXIDINE GLUCONATE 213 G/1000ML
15 SOLUTION TOPICAL EVERY 12 HOURS
Refills: 0 | Status: DISCONTINUED | OUTPATIENT
Start: 2024-03-19 | End: 2024-03-19

## 2024-03-19 RX ORDER — PROPOFOL 10 MG/ML
5.01 INJECTION, EMULSION INTRAVENOUS
Qty: 1000 | Refills: 0 | Status: DISCONTINUED | OUTPATIENT
Start: 2024-03-19 | End: 2024-03-19

## 2024-03-19 RX ORDER — SODIUM CHLORIDE 9 MG/ML
1000 INJECTION, SOLUTION INTRAVENOUS ONCE
Refills: 0 | Status: COMPLETED | OUTPATIENT
Start: 2024-03-19 | End: 2024-03-19

## 2024-03-19 RX ORDER — INSULIN HUMAN 100 [IU]/ML
1 INJECTION, SOLUTION SUBCUTANEOUS
Qty: 50 | Refills: 0 | Status: DISCONTINUED | OUTPATIENT
Start: 2024-03-19 | End: 2024-03-20

## 2024-03-19 RX ORDER — ASPIRIN/CALCIUM CARB/MAGNESIUM 324 MG
81 TABLET ORAL DAILY
Refills: 0 | Status: DISCONTINUED | OUTPATIENT
Start: 2024-03-19 | End: 2024-03-29

## 2024-03-19 RX ORDER — NICARDIPINE HYDROCHLORIDE 30 MG/1
5 CAPSULE, EXTENDED RELEASE ORAL
Qty: 40 | Refills: 0 | Status: DISCONTINUED | OUTPATIENT
Start: 2024-03-19 | End: 2024-03-20

## 2024-03-19 RX ORDER — MUPIROCIN 20 MG/G
1 OINTMENT TOPICAL
Refills: 0 | Status: COMPLETED | OUTPATIENT
Start: 2024-03-19 | End: 2024-03-24

## 2024-03-19 RX ORDER — MILRINONE LACTATE 1 MG/ML
0.25 INJECTION, SOLUTION INTRAVENOUS
Qty: 20 | Refills: 0 | Status: DISCONTINUED | OUTPATIENT
Start: 2024-03-19 | End: 2024-03-21

## 2024-03-19 RX ORDER — GABAPENTIN 400 MG/1
100 CAPSULE ORAL THREE TIMES A DAY
Refills: 0 | Status: COMPLETED | OUTPATIENT
Start: 2024-03-19 | End: 2024-03-26

## 2024-03-19 RX ORDER — OXYCODONE HYDROCHLORIDE 5 MG/1
10 TABLET ORAL EVERY 6 HOURS
Refills: 0 | Status: DISCONTINUED | OUTPATIENT
Start: 2024-03-19 | End: 2024-03-26

## 2024-03-19 RX ORDER — CHLORHEXIDINE GLUCONATE 213 G/1000ML
15 SOLUTION TOPICAL EVERY 12 HOURS
Refills: 0 | Status: DISCONTINUED | OUTPATIENT
Start: 2024-03-19 | End: 2024-03-24

## 2024-03-19 RX ORDER — NOREPINEPHRINE BITARTRATE/D5W 8 MG/250ML
0.05 PLASTIC BAG, INJECTION (ML) INTRAVENOUS
Qty: 8 | Refills: 0 | Status: DISCONTINUED | OUTPATIENT
Start: 2024-03-19 | End: 2024-03-21

## 2024-03-19 RX ORDER — ACETAMINOPHEN 500 MG
1000 TABLET ORAL EVERY 6 HOURS
Refills: 0 | Status: COMPLETED | OUTPATIENT
Start: 2024-03-19 | End: 2024-03-20

## 2024-03-19 RX ORDER — SENNA PLUS 8.6 MG/1
2 TABLET ORAL AT BEDTIME
Refills: 0 | Status: DISCONTINUED | OUTPATIENT
Start: 2024-03-20 | End: 2024-03-31

## 2024-03-19 RX ORDER — DOBUTAMINE HCL 250MG/20ML
3 VIAL (ML) INTRAVENOUS
Qty: 500 | Refills: 0 | Status: DISCONTINUED | OUTPATIENT
Start: 2024-03-19 | End: 2024-03-20

## 2024-03-19 RX ORDER — CHLORHEXIDINE GLUCONATE 213 G/1000ML
1 SOLUTION TOPICAL DAILY
Refills: 0 | Status: DISCONTINUED | OUTPATIENT
Start: 2024-03-19 | End: 2024-03-31

## 2024-03-19 RX ORDER — POTASSIUM PHOSPHATE, MONOBASIC POTASSIUM PHOSPHATE, DIBASIC 236; 224 MG/ML; MG/ML
15 INJECTION, SOLUTION INTRAVENOUS ONCE
Refills: 0 | Status: COMPLETED | OUTPATIENT
Start: 2024-03-19 | End: 2024-03-19

## 2024-03-19 RX ORDER — DEXTROSE 50 % IN WATER 50 %
25 SYRINGE (ML) INTRAVENOUS
Refills: 0 | Status: DISCONTINUED | OUTPATIENT
Start: 2024-03-19 | End: 2024-03-31

## 2024-03-19 RX ORDER — PANTOPRAZOLE SODIUM 20 MG/1
40 TABLET, DELAYED RELEASE ORAL
Refills: 0 | Status: DISCONTINUED | OUTPATIENT
Start: 2024-03-19 | End: 2024-03-31

## 2024-03-19 RX ADMIN — Medication 125 MILLILITER(S): at 21:21

## 2024-03-19 RX ADMIN — HEPARIN SODIUM 5000 UNIT(S): 5000 INJECTION INTRAVENOUS; SUBCUTANEOUS at 05:12

## 2024-03-19 RX ADMIN — POTASSIUM PHOSPHATE, MONOBASIC POTASSIUM PHOSPHATE, DIBASIC 62.5 MILLIMOLE(S): 236; 224 INJECTION, SOLUTION INTRAVENOUS at 23:00

## 2024-03-19 RX ADMIN — Medication 5.24 MICROGRAM(S)/KG/MIN: at 22:41

## 2024-03-19 RX ADMIN — SODIUM CHLORIDE 1000 MILLILITER(S): 9 INJECTION, SOLUTION INTRAVENOUS at 22:00

## 2024-03-19 RX ADMIN — PANTOPRAZOLE SODIUM 40 MILLIGRAM(S): 20 TABLET, DELAYED RELEASE ORAL at 06:05

## 2024-03-19 RX ADMIN — Medication 125 MILLILITER(S): at 23:16

## 2024-03-19 RX ADMIN — GABAPENTIN 300 MILLIGRAM(S): 400 CAPSULE ORAL at 05:12

## 2024-03-19 RX ADMIN — MUPIROCIN 1 APPLICATION(S): 20 OINTMENT TOPICAL at 05:14

## 2024-03-19 RX ADMIN — Medication 5.03 MICROGRAM(S)/KG/MIN: at 22:41

## 2024-03-19 RX ADMIN — Medication 4: at 06:04

## 2024-03-19 RX ADMIN — CHLORHEXIDINE GLUCONATE 15 MILLILITER(S): 213 SOLUTION TOPICAL at 18:58

## 2024-03-19 RX ADMIN — SODIUM CHLORIDE 3 MILLILITER(S): 9 INJECTION INTRAMUSCULAR; INTRAVENOUS; SUBCUTANEOUS at 05:16

## 2024-03-19 RX ADMIN — Medication 25 MILLIGRAM(S): at 05:12

## 2024-03-19 RX ADMIN — Medication 1000 MILLIGRAM(S): at 05:28

## 2024-03-19 RX ADMIN — Medication 1 DROP(S): at 05:13

## 2024-03-19 RX ADMIN — Medication 400 MILLIGRAM(S): at 05:13

## 2024-03-19 RX ADMIN — CHLORHEXIDINE GLUCONATE 10 MILLILITER(S): 213 SOLUTION TOPICAL at 05:13

## 2024-03-19 RX ADMIN — Medication 400 MILLIGRAM(S): at 18:58

## 2024-03-19 RX ADMIN — MUPIROCIN 1 APPLICATION(S): 20 OINTMENT TOPICAL at 19:41

## 2024-03-19 NOTE — BRIEF OPERATIVE NOTE - COMMENTS
I was the first assistant for this case including but not limited to sternotomy with aortic root replacement (21mm Konect).

## 2024-03-19 NOTE — PROGRESS NOTE ADULT - SUBJECTIVE AND OBJECTIVE BOX
CTICU  CRITICAL  CARE  attending     Hand off received 					   Pertinent clinical, laboratory, radiographic, hemodynamic, echocardiographic, respiratory data, microbiologic data and chart were reviewed and analyzed frequently throughout the course of the day and night  Patient seen and examined with CTS/ SH attending at bedside    Pt is a 73y , Female, operative day s/p bio Bental     intra op:    4 units PRBC  4 units FFP    admitted with pressor/inotrope support  primacor added in place of Epi for high SVR (2000's)  transiently AV paced for hemodynamics    74 y/o female PMH DM, severe AS, EF 60%, HTN, HLD, anemia who was admitted to [facility] with syncope. CT negative for CVA. subsequent echo showed severe AS. Transferred to [facility] under Dr. Moon for evaluation for TAVR vs BAV.      Aortic stenosis, severe    HTN (hypertension)    Hyperlipidemia    Type 2 diabetes mellitus        , FAMILY HISTORY:  No pertinent family history in first degree relatives    PAST MEDICAL & SURGICAL HISTORY:  Aortic stenosis      HTN (hypertension)      DM (diabetes mellitus)      Hyperlipidemia      Anemia      No significant past surgical history        Patient is a 73y old  Female who presents with a chief complaint of SEVERE AORTIC STENOSIS     (18 Mar 2024 14:55)      14 system review unable to assess    Vital signs, hemodynamic and respiratory parameters were reviewed from the bedside nursing flowsheet.  ICU Vital Signs Last 24 Hrs  T(C): 35.3 (19 Mar 2024 17:45), Max: 36.4 (19 Mar 2024 01:01)  T(F): 95.6 (19 Mar 2024 17:45), Max: 97.5 (19 Mar 2024 01:01)  HR: 66 (19 Mar 2024 18:15) (62 - 104)  BP: 119/57 (19 Mar 2024 05:05) (87/51 - 119/57)  BP(mean): 82 (19 Mar 2024 05:05) (54 - 82)  ABP: 115/50 (19 Mar 2024 18:15) (87/43 - 145/65)  ABP(mean): 66 (19 Mar 2024 18:15) (57 - 89)  RR: 14 (19 Mar 2024 18:15) (10 - 18)  SpO2: 100% (19 Mar 2024 18:15) (97% - 100%)    O2 Parameters below as of 19 Mar 2024 18:30  Patient On (Oxygen Delivery Method): ventilator          Adult Advanced Hemodynamics Last 24 Hrs  CVP(mm Hg): 12 (19 Mar 2024 18:15) (11 - 14)  CVP(cm H2O): --  CO: 2.6 (19 Mar 2024 18:15) (2.6 - 2.6)  CI: 1.6 (19 Mar 2024 18:15) (1.6 - 1.6)  PA: 36/18 (19 Mar 2024 18:15) (30/14 - 38/20)  PA(mean): 25 (19 Mar 2024 18:15) (20 - 26)  PCWP: --  SVR: 1659 (19 Mar 2024 18:15) (1659 - 1659)  SVRI: 2696 (19 Mar 2024 18:15) (2696 - 2696)  PVR: --  PVRI: --, ABG - ( 19 Mar 2024 16:18 )  pH, Arterial: 7.40  pH, Blood: x     /  pCO2: 42    /  pO2: 161   / HCO3: 26    / Base Excess: 1.0   /  SaO2: 99.8              Mode: AC/ CMV (Assist Control/ Continuous Mandatory Ventilation)  RR (machine): 10  TV (machine): 400  FiO2: 50  PEEP: 5  ITime: 1  MAP: 8  PIP: 16    Intake and output was reviewed and the fluid balance was calculated  Daily     Daily   I&O's Summary    18 Mar 2024 07:01  -  19 Mar 2024 07:00  --------------------------------------------------------  IN: 237 mL / OUT: 0 mL / NET: 237 mL    19 Mar 2024 07:01  -  19 Mar 2024 18:39  --------------------------------------------------------  IN: 53 mL / OUT: 275 mL / NET: -222 mL        All lines and drain sites were assessed  Glycemic trend was reviewedCAPILLARY BLOOD GLUCOSE      POCT Blood Glucose.: 232 mg/dL (19 Mar 2024 17:50)    No acute change in mental status  (+) Orotracheally intubated  Auscultation of the chest reveals equal bs  Abdomen is soft  Extremities are warm and well perfused  Wounds appear clean and unremarkable  Antibiotics are periop    labs  CBC Full  -  ( 19 Mar 2024 16:18 )  WBC Count : 10.51 K/uL  RBC Count : 3.71 M/uL  Hemoglobin : 10.2 g/dL  Hematocrit : 31.7 %  Platelet Count - Automated : 212 K/uL  Mean Cell Volume : 85.4 fl  Mean Cell Hemoglobin : 27.5 pg  Mean Cell Hemoglobin Concentration : 32.2 gm/dL  Auto Neutrophil # : 7.74 K/uL  Auto Lymphocyte # : 1.28 K/uL  Auto Monocyte # : 1.21 K/uL  Auto Eosinophil # : 0.11 K/uL  Auto Basophil # : 0.03 K/uL  Auto Neutrophil % : 73.7 %  Auto Lymphocyte % : 12.2 %  Auto Monocyte % : 11.5 %  Auto Eosinophil % : 1.0 %  Auto Basophil % : 0.3 %    03-19    147<H>  |  113<H>  |  12  ----------------------------<  215<H>  4.6   |  26  |  0.62    Ca    8.6      19 Mar 2024 16:18  Phos  3.2     03-19  Mg     3.5     03-19    TPro  4.7<L>  /  Alb  2.7<L>  /  TBili  1.1  /  DBili  x   /  AST  88<H>  /  ALT  31  /  AlkPhos  53  03-19    PT/INR - ( 19 Mar 2024 16:18 )   PT: 12.2 sec;   INR: 1.07          PTT - ( 19 Mar 2024 16:18 )  PTT:29.3 sec  The current medications were reviewed   MEDICATIONS  (STANDING):  acetaminophen   IVPB .. 1000 milliGRAM(s) IV Intermittent every 6 hours  ascorbic acid 500 milliGRAM(s) Oral two times a day  aspirin enteric coated 81 milliGRAM(s) Oral daily  chlorhexidine 0.12% Liquid 15 milliLiter(s) Oral Mucosa every 12 hours  chlorhexidine 0.12% Liquid 15 milliLiter(s) Oral Mucosa every 12 hours  chlorhexidine 2% Cloths 1 Application(s) Topical daily  dextrose 50% Injectable 50 milliLiter(s) IV Push every 15 minutes  dextrose 50% Injectable 25 milliLiter(s) IV Push every 15 minutes  gabapentin 100 milliGRAM(s) Oral three times a day  heparin   Injectable 5000 Unit(s) SubCutaneous every 8 hours  insulin regular Infusion 1 Unit(s)/Hr (1 mL/Hr) IV Continuous <Continuous>  milrinone Infusion 0.25 MICROgram(s)/kG/Min (4.19 mL/Hr) IV Continuous <Continuous>  mupirocin 2% Ointment 1 Application(s) Both Nostrils two times a day  niCARdipine Infusion 5 mG/Hr (25 mL/Hr) IV Continuous <Continuous>  pantoprazole  Injectable 40 milliGRAM(s) IV Push daily  propofol Infusion 5.009 MICROgram(s)/kG/Min (1.68 mL/Hr) IV Continuous <Continuous>  sodium chloride 0.9%. 1000 milliLiter(s) (10 mL/Hr) IV Continuous <Continuous>    MEDICATIONS  (PRN):  fentaNYL    Injectable 25 MICROGram(s) IV Push every 4 hours PRN Breakthrough Pain       PROBLEM LIST/ ASSESSMENT:  HEALTH ISSUES - PROBLEM Dx:  Aortic stenosis, severe    HTN (hypertension)    Hyperlipidemia    Type 2 diabetes mellitus        ,   Patient is a 73y old  Female who presents with a chief complaint of SEVERE AORTIC STENOSIS     (18 Mar 2024 14:55)     s/p bio bental procedure      My plan includes :    Titrate pressor support to maintain MAP >70-75  Titrate inotrope support to maintain CI >2.2/MVO2 >60  Trend lactate levels  Full Vent support  Titrate Fio2 to maintain Sao2 >95%  Serial ABGs  Wean to extubate in the am  Strict blood sugar control        close hemodynamic, ventilatory and drain monitoring and management per post op routine    Monitor for arrhythmias and monitor parameters for organ perfusion  monitor neurologic status  Head of the bed should remain elevated to 45 deg .   chest PT and IS will be encouraged  monitor adequacy of oxygenation and ventilation and attempt to wean oxygen  Nutritional goals will be met using po eventually , ensure adequate caloric intake and montior the same  Stress ulcer and VTE prophylaxis will be achieved    Glycemic control is satisfactory  Electrolytes have been repleted as necessary and wound care has been carried out. Pain control has been achieved.   agressive physical therapy and early mobility and ambulation goals will be met   The family was updated about the course and plan  CRITICAL CARE TIME SPENT in evaluation and management, reassessments, review and interpretation of labs and x-rays, ventilator and hemodynamic management, formulating a plan and coordinating care: ___90____ MIN.  Time does not include procedural time.  CTICU ATTENDING     					    Saravanan Wilson MD

## 2024-03-20 LAB
ALBUMIN SERPL ELPH-MCNC: 3.5 G/DL — SIGNIFICANT CHANGE UP (ref 3.3–5)
ALBUMIN SERPL ELPH-MCNC: 3.8 G/DL — SIGNIFICANT CHANGE UP (ref 3.3–5)
ALBUMIN SERPL ELPH-MCNC: 3.8 G/DL — SIGNIFICANT CHANGE UP (ref 3.3–5)
ALBUMIN SERPL ELPH-MCNC: 3.9 G/DL — SIGNIFICANT CHANGE UP (ref 3.3–5)
ALP SERPL-CCNC: 48 U/L — SIGNIFICANT CHANGE UP (ref 40–120)
ALP SERPL-CCNC: 51 U/L — SIGNIFICANT CHANGE UP (ref 40–120)
ALP SERPL-CCNC: 51 U/L — SIGNIFICANT CHANGE UP (ref 40–120)
ALP SERPL-CCNC: 54 U/L — SIGNIFICANT CHANGE UP (ref 40–120)
ALT FLD-CCNC: 22 U/L — SIGNIFICANT CHANGE UP (ref 10–45)
ALT FLD-CCNC: 23 U/L — SIGNIFICANT CHANGE UP (ref 10–45)
ALT FLD-CCNC: 24 U/L — SIGNIFICANT CHANGE UP (ref 10–45)
ALT FLD-CCNC: 30 U/L — SIGNIFICANT CHANGE UP (ref 10–45)
ANION GAP SERPL CALC-SCNC: 11 MMOL/L — SIGNIFICANT CHANGE UP (ref 5–17)
ANION GAP SERPL CALC-SCNC: 11 MMOL/L — SIGNIFICANT CHANGE UP (ref 5–17)
ANION GAP SERPL CALC-SCNC: 13 MMOL/L — SIGNIFICANT CHANGE UP (ref 5–17)
ANION GAP SERPL CALC-SCNC: 14 MMOL/L — SIGNIFICANT CHANGE UP (ref 5–17)
APTT BLD: 29.1 SEC — SIGNIFICANT CHANGE UP (ref 24.5–35.6)
APTT BLD: 31.3 SEC — SIGNIFICANT CHANGE UP (ref 24.5–35.6)
APTT BLD: 31.3 SEC — SIGNIFICANT CHANGE UP (ref 24.5–35.6)
APTT BLD: 34.4 SEC — SIGNIFICANT CHANGE UP (ref 24.5–35.6)
AST SERPL-CCNC: 42 U/L — HIGH (ref 10–40)
AST SERPL-CCNC: 56 U/L — HIGH (ref 10–40)
AST SERPL-CCNC: 59 U/L — HIGH (ref 10–40)
AST SERPL-CCNC: 69 U/L — HIGH (ref 10–40)
BASE EXCESS BLDV CALC-SCNC: -0.2 MMOL/L — SIGNIFICANT CHANGE UP (ref -2–3)
BASE EXCESS BLDV CALC-SCNC: -1.3 MMOL/L — SIGNIFICANT CHANGE UP (ref -2–3)
BASE EXCESS BLDV CALC-SCNC: -1.3 MMOL/L — SIGNIFICANT CHANGE UP (ref -2–3)
BASOPHILS # BLD AUTO: 0 K/UL — SIGNIFICANT CHANGE UP (ref 0–0.2)
BASOPHILS # BLD AUTO: 0.01 K/UL — SIGNIFICANT CHANGE UP (ref 0–0.2)
BASOPHILS # BLD AUTO: 0.02 K/UL — SIGNIFICANT CHANGE UP (ref 0–0.2)
BASOPHILS # BLD AUTO: 0.03 K/UL — SIGNIFICANT CHANGE UP (ref 0–0.2)
BASOPHILS NFR BLD AUTO: 0 % — SIGNIFICANT CHANGE UP (ref 0–2)
BASOPHILS NFR BLD AUTO: 0.1 % — SIGNIFICANT CHANGE UP (ref 0–2)
BASOPHILS NFR BLD AUTO: 0.2 % — SIGNIFICANT CHANGE UP (ref 0–2)
BASOPHILS NFR BLD AUTO: 0.3 % — SIGNIFICANT CHANGE UP (ref 0–2)
BILIRUB SERPL-MCNC: 0.5 MG/DL — SIGNIFICANT CHANGE UP (ref 0.2–1.2)
BILIRUB SERPL-MCNC: 0.5 MG/DL — SIGNIFICANT CHANGE UP (ref 0.2–1.2)
BILIRUB SERPL-MCNC: 0.6 MG/DL — SIGNIFICANT CHANGE UP (ref 0.2–1.2)
BILIRUB SERPL-MCNC: 1 MG/DL — SIGNIFICANT CHANGE UP (ref 0.2–1.2)
BUN SERPL-MCNC: 10 MG/DL — SIGNIFICANT CHANGE UP (ref 7–23)
BUN SERPL-MCNC: 7 MG/DL — SIGNIFICANT CHANGE UP (ref 7–23)
BUN SERPL-MCNC: 8 MG/DL — SIGNIFICANT CHANGE UP (ref 7–23)
BUN SERPL-MCNC: 9 MG/DL — SIGNIFICANT CHANGE UP (ref 7–23)
CA-I SERPL-SCNC: 1.08 MMOL/L — LOW (ref 1.15–1.33)
CA-I SERPL-SCNC: 1.11 MMOL/L — LOW (ref 1.15–1.33)
CA-I SERPL-SCNC: 1.16 MMOL/L — SIGNIFICANT CHANGE UP (ref 1.15–1.33)
CALCIUM SERPL-MCNC: 8.2 MG/DL — LOW (ref 8.4–10.5)
CALCIUM SERPL-MCNC: 8.3 MG/DL — LOW (ref 8.4–10.5)
CALCIUM SERPL-MCNC: 8.6 MG/DL — SIGNIFICANT CHANGE UP (ref 8.4–10.5)
CALCIUM SERPL-MCNC: 8.7 MG/DL — SIGNIFICANT CHANGE UP (ref 8.4–10.5)
CHLORIDE SERPL-SCNC: 107 MMOL/L — SIGNIFICANT CHANGE UP (ref 96–108)
CHLORIDE SERPL-SCNC: 109 MMOL/L — HIGH (ref 96–108)
CHLORIDE SERPL-SCNC: 110 MMOL/L — HIGH (ref 96–108)
CHLORIDE SERPL-SCNC: 112 MMOL/L — HIGH (ref 96–108)
CO2 BLDV-SCNC: 24.9 MMOL/L — SIGNIFICANT CHANGE UP (ref 22–26)
CO2 BLDV-SCNC: 24.9 MMOL/L — SIGNIFICANT CHANGE UP (ref 22–26)
CO2 BLDV-SCNC: 26.1 MMOL/L — HIGH (ref 22–26)
CO2 SERPL-SCNC: 21 MMOL/L — LOW (ref 22–31)
CO2 SERPL-SCNC: 23 MMOL/L — SIGNIFICANT CHANGE UP (ref 22–31)
CO2 SERPL-SCNC: 24 MMOL/L — SIGNIFICANT CHANGE UP (ref 22–31)
CO2 SERPL-SCNC: 25 MMOL/L — SIGNIFICANT CHANGE UP (ref 22–31)
CREAT SERPL-MCNC: 0.6 MG/DL — SIGNIFICANT CHANGE UP (ref 0.5–1.3)
CREAT SERPL-MCNC: 0.6 MG/DL — SIGNIFICANT CHANGE UP (ref 0.5–1.3)
CREAT SERPL-MCNC: 0.61 MG/DL — SIGNIFICANT CHANGE UP (ref 0.5–1.3)
CREAT SERPL-MCNC: 0.66 MG/DL — SIGNIFICANT CHANGE UP (ref 0.5–1.3)
EGFR: 94 ML/MIN/1.73M2 — SIGNIFICANT CHANGE UP
EGFR: 96 ML/MIN/1.73M2 — SIGNIFICANT CHANGE UP
EOSINOPHIL # BLD AUTO: 0.01 K/UL — SIGNIFICANT CHANGE UP (ref 0–0.5)
EOSINOPHIL # BLD AUTO: 0.01 K/UL — SIGNIFICANT CHANGE UP (ref 0–0.5)
EOSINOPHIL # BLD AUTO: 0.02 K/UL — SIGNIFICANT CHANGE UP (ref 0–0.5)
EOSINOPHIL # BLD AUTO: 0.02 K/UL — SIGNIFICANT CHANGE UP (ref 0–0.5)
EOSINOPHIL NFR BLD AUTO: 0.1 % — SIGNIFICANT CHANGE UP (ref 0–6)
EOSINOPHIL NFR BLD AUTO: 0.1 % — SIGNIFICANT CHANGE UP (ref 0–6)
EOSINOPHIL NFR BLD AUTO: 0.2 % — SIGNIFICANT CHANGE UP (ref 0–6)
EOSINOPHIL NFR BLD AUTO: 0.2 % — SIGNIFICANT CHANGE UP (ref 0–6)
GAS PNL BLDA: SIGNIFICANT CHANGE UP
GAS PNL BLDV: 140 MMOL/L — SIGNIFICANT CHANGE UP (ref 136–145)
GAS PNL BLDV: 143 MMOL/L — SIGNIFICANT CHANGE UP (ref 136–145)
GAS PNL BLDV: 143 MMOL/L — SIGNIFICANT CHANGE UP (ref 136–145)
GAS PNL BLDV: SIGNIFICANT CHANGE UP
GLUCOSE BLDC GLUCOMTR-MCNC: 117 MG/DL — HIGH (ref 70–99)
GLUCOSE BLDC GLUCOMTR-MCNC: 122 MG/DL — HIGH (ref 70–99)
GLUCOSE BLDC GLUCOMTR-MCNC: 128 MG/DL — HIGH (ref 70–99)
GLUCOSE BLDC GLUCOMTR-MCNC: 130 MG/DL — HIGH (ref 70–99)
GLUCOSE BLDC GLUCOMTR-MCNC: 131 MG/DL — HIGH (ref 70–99)
GLUCOSE BLDC GLUCOMTR-MCNC: 132 MG/DL — HIGH (ref 70–99)
GLUCOSE BLDC GLUCOMTR-MCNC: 133 MG/DL — HIGH (ref 70–99)
GLUCOSE BLDC GLUCOMTR-MCNC: 143 MG/DL — HIGH (ref 70–99)
GLUCOSE BLDC GLUCOMTR-MCNC: 151 MG/DL — HIGH (ref 70–99)
GLUCOSE BLDC GLUCOMTR-MCNC: 214 MG/DL — HIGH (ref 70–99)
GLUCOSE BLDC GLUCOMTR-MCNC: 251 MG/DL — HIGH (ref 70–99)
GLUCOSE BLDC GLUCOMTR-MCNC: 88 MG/DL — SIGNIFICANT CHANGE UP (ref 70–99)
GLUCOSE SERPL-MCNC: 134 MG/DL — HIGH (ref 70–99)
GLUCOSE SERPL-MCNC: 148 MG/DL — HIGH (ref 70–99)
GLUCOSE SERPL-MCNC: 207 MG/DL — HIGH (ref 70–99)
GLUCOSE SERPL-MCNC: 240 MG/DL — HIGH (ref 70–99)
HCO3 BLDV-SCNC: 24 MMOL/L — SIGNIFICANT CHANGE UP (ref 22–29)
HCO3 BLDV-SCNC: 24 MMOL/L — SIGNIFICANT CHANGE UP (ref 22–29)
HCO3 BLDV-SCNC: 25 MMOL/L — SIGNIFICANT CHANGE UP (ref 22–29)
HCT VFR BLD CALC: 28.1 % — LOW (ref 34.5–45)
HCT VFR BLD CALC: 29.3 % — LOW (ref 34.5–45)
HCT VFR BLD CALC: 29.7 % — LOW (ref 34.5–45)
HCT VFR BLD CALC: 29.8 % — LOW (ref 34.5–45)
HGB BLD-MCNC: 9.1 G/DL — LOW (ref 11.5–15.5)
HGB BLD-MCNC: 9.7 G/DL — LOW (ref 11.5–15.5)
HGB BLD-MCNC: 9.8 G/DL — LOW (ref 11.5–15.5)
HGB BLD-MCNC: 9.8 G/DL — LOW (ref 11.5–15.5)
IMM GRANULOCYTES NFR BLD AUTO: 0.6 % — SIGNIFICANT CHANGE UP (ref 0–0.9)
IMM GRANULOCYTES NFR BLD AUTO: 0.7 % — SIGNIFICANT CHANGE UP (ref 0–0.9)
IMM GRANULOCYTES NFR BLD AUTO: 0.7 % — SIGNIFICANT CHANGE UP (ref 0–0.9)
IMM GRANULOCYTES NFR BLD AUTO: 0.8 % — SIGNIFICANT CHANGE UP (ref 0–0.9)
INR BLD: 1.09 — SIGNIFICANT CHANGE UP (ref 0.85–1.18)
INR BLD: 1.18 — SIGNIFICANT CHANGE UP (ref 0.85–1.18)
INR BLD: 1.2 — HIGH (ref 0.85–1.18)
INR BLD: 1.22 — HIGH (ref 0.85–1.18)
LACTATE SERPL-SCNC: 0.7 MMOL/L — SIGNIFICANT CHANGE UP (ref 0.5–2)
LACTATE SERPL-SCNC: 0.8 MMOL/L — SIGNIFICANT CHANGE UP (ref 0.5–2)
LACTATE SERPL-SCNC: 0.9 MMOL/L — SIGNIFICANT CHANGE UP (ref 0.5–2)
LACTATE SERPL-SCNC: 1.1 MMOL/L — SIGNIFICANT CHANGE UP (ref 0.5–2)
LYMPHOCYTES # BLD AUTO: 0.91 K/UL — LOW (ref 1–3.3)
LYMPHOCYTES # BLD AUTO: 1.05 K/UL — SIGNIFICANT CHANGE UP (ref 1–3.3)
LYMPHOCYTES # BLD AUTO: 1.07 K/UL — SIGNIFICANT CHANGE UP (ref 1–3.3)
LYMPHOCYTES # BLD AUTO: 1.35 K/UL — SIGNIFICANT CHANGE UP (ref 1–3.3)
LYMPHOCYTES # BLD AUTO: 12 % — LOW (ref 13–44)
LYMPHOCYTES # BLD AUTO: 12.3 % — LOW (ref 13–44)
LYMPHOCYTES # BLD AUTO: 7.6 % — LOW (ref 13–44)
LYMPHOCYTES # BLD AUTO: 9.2 % — LOW (ref 13–44)
MAGNESIUM SERPL-MCNC: 2.2 MG/DL — SIGNIFICANT CHANGE UP (ref 1.6–2.6)
MAGNESIUM SERPL-MCNC: 2.3 MG/DL — SIGNIFICANT CHANGE UP (ref 1.6–2.6)
MAGNESIUM SERPL-MCNC: 2.3 MG/DL — SIGNIFICANT CHANGE UP (ref 1.6–2.6)
MAGNESIUM SERPL-MCNC: 2.5 MG/DL — SIGNIFICANT CHANGE UP (ref 1.6–2.6)
MCHC RBC-ENTMCNC: 27 PG — SIGNIFICANT CHANGE UP (ref 27–34)
MCHC RBC-ENTMCNC: 27.2 PG — SIGNIFICANT CHANGE UP (ref 27–34)
MCHC RBC-ENTMCNC: 27.5 PG — SIGNIFICANT CHANGE UP (ref 27–34)
MCHC RBC-ENTMCNC: 27.5 PG — SIGNIFICANT CHANGE UP (ref 27–34)
MCHC RBC-ENTMCNC: 32.4 GM/DL — SIGNIFICANT CHANGE UP (ref 32–36)
MCHC RBC-ENTMCNC: 32.9 GM/DL — SIGNIFICANT CHANGE UP (ref 32–36)
MCHC RBC-ENTMCNC: 33 GM/DL — SIGNIFICANT CHANGE UP (ref 32–36)
MCHC RBC-ENTMCNC: 33.1 GM/DL — SIGNIFICANT CHANGE UP (ref 32–36)
MCV RBC AUTO: 82.1 FL — SIGNIFICANT CHANGE UP (ref 80–100)
MCV RBC AUTO: 83 FL — SIGNIFICANT CHANGE UP (ref 80–100)
MCV RBC AUTO: 83.2 FL — SIGNIFICANT CHANGE UP (ref 80–100)
MCV RBC AUTO: 83.9 FL — SIGNIFICANT CHANGE UP (ref 80–100)
MONOCYTES # BLD AUTO: 1.02 K/UL — HIGH (ref 0–0.9)
MONOCYTES # BLD AUTO: 1.31 K/UL — HIGH (ref 0–0.9)
MONOCYTES # BLD AUTO: 1.4 K/UL — HIGH (ref 0–0.9)
MONOCYTES # BLD AUTO: 1.49 K/UL — HIGH (ref 0–0.9)
MONOCYTES NFR BLD AUTO: 11.4 % — SIGNIFICANT CHANGE UP (ref 2–14)
MONOCYTES NFR BLD AUTO: 11.5 % — SIGNIFICANT CHANGE UP (ref 2–14)
MONOCYTES NFR BLD AUTO: 12.5 % — SIGNIFICANT CHANGE UP (ref 2–14)
MONOCYTES NFR BLD AUTO: 12.8 % — SIGNIFICANT CHANGE UP (ref 2–14)
NEUTROPHILS # BLD AUTO: 6.78 K/UL — SIGNIFICANT CHANGE UP (ref 1.8–7.4)
NEUTROPHILS # BLD AUTO: 8.08 K/UL — HIGH (ref 1.8–7.4)
NEUTROPHILS # BLD AUTO: 8.91 K/UL — HIGH (ref 1.8–7.4)
NEUTROPHILS # BLD AUTO: 9.4 K/UL — HIGH (ref 1.8–7.4)
NEUTROPHILS NFR BLD AUTO: 73.6 % — SIGNIFICANT CHANGE UP (ref 43–77)
NEUTROPHILS NFR BLD AUTO: 75.8 % — SIGNIFICANT CHANGE UP (ref 43–77)
NEUTROPHILS NFR BLD AUTO: 78.3 % — HIGH (ref 43–77)
NEUTROPHILS NFR BLD AUTO: 79 % — HIGH (ref 43–77)
NRBC # BLD: 0 /100 WBCS — SIGNIFICANT CHANGE UP (ref 0–0)
PCO2 BLDV: 40 MMHG — SIGNIFICANT CHANGE UP (ref 39–42)
PCO2 BLDV: 40 MMHG — SIGNIFICANT CHANGE UP (ref 39–42)
PCO2 BLDV: 41 MMHG — SIGNIFICANT CHANGE UP (ref 39–42)
PH BLDV: 7.38 — SIGNIFICANT CHANGE UP (ref 7.32–7.43)
PH BLDV: 7.38 — SIGNIFICANT CHANGE UP (ref 7.32–7.43)
PH BLDV: 7.39 — SIGNIFICANT CHANGE UP (ref 7.32–7.43)
PHOSPHATE SERPL-MCNC: 3.5 MG/DL — SIGNIFICANT CHANGE UP (ref 2.5–4.5)
PHOSPHATE SERPL-MCNC: 4 MG/DL — SIGNIFICANT CHANGE UP (ref 2.5–4.5)
PHOSPHATE SERPL-MCNC: 4 MG/DL — SIGNIFICANT CHANGE UP (ref 2.5–4.5)
PHOSPHATE SERPL-MCNC: 4.3 MG/DL — SIGNIFICANT CHANGE UP (ref 2.5–4.5)
PLATELET # BLD AUTO: 243 K/UL — SIGNIFICANT CHANGE UP (ref 150–400)
PLATELET # BLD AUTO: 256 K/UL — SIGNIFICANT CHANGE UP (ref 150–400)
PLATELET # BLD AUTO: 263 K/UL — SIGNIFICANT CHANGE UP (ref 150–400)
PLATELET # BLD AUTO: 267 K/UL — SIGNIFICANT CHANGE UP (ref 150–400)
PO2 BLDV: 42 MMHG — SIGNIFICANT CHANGE UP (ref 25–45)
PO2 BLDV: 42 MMHG — SIGNIFICANT CHANGE UP (ref 25–45)
PO2 BLDV: 44 MMHG — SIGNIFICANT CHANGE UP (ref 25–45)
POTASSIUM BLDV-SCNC: 3.4 MMOL/L — LOW (ref 3.5–5.1)
POTASSIUM BLDV-SCNC: 3.4 MMOL/L — LOW (ref 3.5–5.1)
POTASSIUM BLDV-SCNC: 3.6 MMOL/L — SIGNIFICANT CHANGE UP (ref 3.5–5.1)
POTASSIUM SERPL-MCNC: 3.8 MMOL/L — SIGNIFICANT CHANGE UP (ref 3.5–5.3)
POTASSIUM SERPL-MCNC: 3.9 MMOL/L — SIGNIFICANT CHANGE UP (ref 3.5–5.3)
POTASSIUM SERPL-MCNC: 4 MMOL/L — SIGNIFICANT CHANGE UP (ref 3.5–5.3)
POTASSIUM SERPL-MCNC: 4.4 MMOL/L — SIGNIFICANT CHANGE UP (ref 3.5–5.3)
POTASSIUM SERPL-SCNC: 3.8 MMOL/L — SIGNIFICANT CHANGE UP (ref 3.5–5.3)
POTASSIUM SERPL-SCNC: 3.9 MMOL/L — SIGNIFICANT CHANGE UP (ref 3.5–5.3)
POTASSIUM SERPL-SCNC: 4 MMOL/L — SIGNIFICANT CHANGE UP (ref 3.5–5.3)
POTASSIUM SERPL-SCNC: 4.4 MMOL/L — SIGNIFICANT CHANGE UP (ref 3.5–5.3)
PROT SERPL-MCNC: 5.3 G/DL — LOW (ref 6–8.3)
PROT SERPL-MCNC: 5.6 G/DL — LOW (ref 6–8.3)
PROT SERPL-MCNC: 5.7 G/DL — LOW (ref 6–8.3)
PROT SERPL-MCNC: 5.8 G/DL — LOW (ref 6–8.3)
PROTHROM AB SERPL-ACNC: 12.4 SEC — SIGNIFICANT CHANGE UP (ref 9.5–13)
PROTHROM AB SERPL-ACNC: 13.4 SEC — HIGH (ref 9.5–13)
PROTHROM AB SERPL-ACNC: 13.6 SEC — HIGH (ref 9.5–13)
PROTHROM AB SERPL-ACNC: 13.8 SEC — HIGH (ref 9.5–13)
RBC # BLD: 3.35 M/UL — LOW (ref 3.8–5.2)
RBC # BLD: 3.53 M/UL — LOW (ref 3.8–5.2)
RBC # BLD: 3.57 M/UL — LOW (ref 3.8–5.2)
RBC # BLD: 3.63 M/UL — LOW (ref 3.8–5.2)
RBC # FLD: 13.5 % — SIGNIFICANT CHANGE UP (ref 10.3–14.5)
RBC # FLD: 14 % — SIGNIFICANT CHANGE UP (ref 10.3–14.5)
RBC # FLD: 14.2 % — SIGNIFICANT CHANGE UP (ref 10.3–14.5)
RBC # FLD: 14.4 % — SIGNIFICANT CHANGE UP (ref 10.3–14.5)
SAO2 % BLDV: 72.4 % — SIGNIFICANT CHANGE UP (ref 67–88)
SAO2 % BLDV: 73.8 % — SIGNIFICANT CHANGE UP (ref 67–88)
SAO2 % BLDV: 74.2 % — SIGNIFICANT CHANGE UP (ref 67–88)
SODIUM SERPL-SCNC: 142 MMOL/L — SIGNIFICANT CHANGE UP (ref 135–145)
SODIUM SERPL-SCNC: 144 MMOL/L — SIGNIFICANT CHANGE UP (ref 135–145)
SODIUM SERPL-SCNC: 146 MMOL/L — HIGH (ref 135–145)
SODIUM SERPL-SCNC: 148 MMOL/L — HIGH (ref 135–145)
WBC # BLD: 10.97 K/UL — HIGH (ref 3.8–10.5)
WBC # BLD: 11.38 K/UL — HIGH (ref 3.8–10.5)
WBC # BLD: 11.9 K/UL — HIGH (ref 3.8–10.5)
WBC # BLD: 8.94 K/UL — SIGNIFICANT CHANGE UP (ref 3.8–10.5)
WBC # FLD AUTO: 10.97 K/UL — HIGH (ref 3.8–10.5)
WBC # FLD AUTO: 11.38 K/UL — HIGH (ref 3.8–10.5)
WBC # FLD AUTO: 11.9 K/UL — HIGH (ref 3.8–10.5)
WBC # FLD AUTO: 8.94 K/UL — SIGNIFICANT CHANGE UP (ref 3.8–10.5)

## 2024-03-20 PROCEDURE — 71045 X-RAY EXAM CHEST 1 VIEW: CPT | Mod: 26

## 2024-03-20 PROCEDURE — 99291 CRITICAL CARE FIRST HOUR: CPT

## 2024-03-20 PROCEDURE — 99292 CRITICAL CARE ADDL 30 MIN: CPT

## 2024-03-20 PROCEDURE — 99232 SBSQ HOSP IP/OBS MODERATE 35: CPT

## 2024-03-20 RX ORDER — POTASSIUM CHLORIDE 20 MEQ
40 PACKET (EA) ORAL ONCE
Refills: 0 | Status: COMPLETED | OUTPATIENT
Start: 2024-03-20 | End: 2024-03-20

## 2024-03-20 RX ORDER — FUROSEMIDE 40 MG
20 TABLET ORAL ONCE
Refills: 0 | Status: COMPLETED | OUTPATIENT
Start: 2024-03-20 | End: 2024-03-20

## 2024-03-20 RX ORDER — INSULIN LISPRO 100/ML
3 VIAL (ML) SUBCUTANEOUS
Refills: 0 | Status: DISCONTINUED | OUTPATIENT
Start: 2024-03-20 | End: 2024-03-21

## 2024-03-20 RX ORDER — SODIUM CHLORIDE 9 MG/ML
1000 INJECTION, SOLUTION INTRAVENOUS
Refills: 0 | Status: DISCONTINUED | OUTPATIENT
Start: 2024-03-20 | End: 2024-03-29

## 2024-03-20 RX ORDER — INSULIN LISPRO 100/ML
VIAL (ML) SUBCUTANEOUS
Refills: 0 | Status: DISCONTINUED | OUTPATIENT
Start: 2024-03-20 | End: 2024-03-29

## 2024-03-20 RX ORDER — INSULIN GLARGINE 100 [IU]/ML
14 INJECTION, SOLUTION SUBCUTANEOUS AT BEDTIME
Refills: 0 | Status: DISCONTINUED | OUTPATIENT
Start: 2024-03-20 | End: 2024-03-20

## 2024-03-20 RX ORDER — POTASSIUM CHLORIDE 20 MEQ
20 PACKET (EA) ORAL ONCE
Refills: 0 | Status: COMPLETED | OUTPATIENT
Start: 2024-03-20 | End: 2024-03-20

## 2024-03-20 RX ORDER — DEXTROSE 50 % IN WATER 50 %
25 SYRINGE (ML) INTRAVENOUS ONCE
Refills: 0 | Status: DISCONTINUED | OUTPATIENT
Start: 2024-03-20 | End: 2024-03-29

## 2024-03-20 RX ORDER — DEXTROSE 50 % IN WATER 50 %
15 SYRINGE (ML) INTRAVENOUS ONCE
Refills: 0 | Status: DISCONTINUED | OUTPATIENT
Start: 2024-03-20 | End: 2024-03-29

## 2024-03-20 RX ORDER — ALBUMIN HUMAN 25 %
100 VIAL (ML) INTRAVENOUS ONCE
Refills: 0 | Status: COMPLETED | OUTPATIENT
Start: 2024-03-20 | End: 2024-03-20

## 2024-03-20 RX ORDER — DOBUTAMINE HCL 250MG/20ML
2 VIAL (ML) INTRAVENOUS
Qty: 500 | Refills: 0 | Status: DISCONTINUED | OUTPATIENT
Start: 2024-03-20 | End: 2024-03-21

## 2024-03-20 RX ORDER — ONDANSETRON 8 MG/1
4 TABLET, FILM COATED ORAL ONCE
Refills: 0 | Status: COMPLETED | OUTPATIENT
Start: 2024-03-20 | End: 2024-03-20

## 2024-03-20 RX ORDER — INSULIN GLARGINE 100 [IU]/ML
18 INJECTION, SOLUTION SUBCUTANEOUS AT BEDTIME
Refills: 0 | Status: DISCONTINUED | OUTPATIENT
Start: 2024-03-20 | End: 2024-03-21

## 2024-03-20 RX ORDER — GLUCAGON INJECTION, SOLUTION 0.5 MG/.1ML
1 INJECTION, SOLUTION SUBCUTANEOUS ONCE
Refills: 0 | Status: DISCONTINUED | OUTPATIENT
Start: 2024-03-20 | End: 2024-03-31

## 2024-03-20 RX ORDER — INSULIN LISPRO 100/ML
12 VIAL (ML) SUBCUTANEOUS
Refills: 0 | Status: DISCONTINUED | OUTPATIENT
Start: 2024-03-20 | End: 2024-03-20

## 2024-03-20 RX ORDER — ALBUMIN HUMAN 25 %
250 VIAL (ML) INTRAVENOUS ONCE
Refills: 0 | Status: COMPLETED | OUTPATIENT
Start: 2024-03-20 | End: 2024-03-20

## 2024-03-20 RX ORDER — FUROSEMIDE 40 MG
20 TABLET ORAL EVERY 8 HOURS
Refills: 0 | Status: DISCONTINUED | OUTPATIENT
Start: 2024-03-20 | End: 2024-03-22

## 2024-03-20 RX ADMIN — ONDANSETRON 4 MILLIGRAM(S): 8 TABLET, FILM COATED ORAL at 06:48

## 2024-03-20 RX ADMIN — Medication 100 MILLIEQUIVALENT(S): at 06:48

## 2024-03-20 RX ADMIN — HEPARIN SODIUM 5000 UNIT(S): 5000 INJECTION INTRAVENOUS; SUBCUTANEOUS at 13:12

## 2024-03-20 RX ADMIN — Medication 500 MILLIGRAM(S): at 06:47

## 2024-03-20 RX ADMIN — Medication 81 MILLIGRAM(S): at 11:25

## 2024-03-20 RX ADMIN — Medication 3 UNIT(S): at 17:59

## 2024-03-20 RX ADMIN — Medication 20 MILLIGRAM(S): at 19:25

## 2024-03-20 RX ADMIN — Medication 125 MILLILITER(S): at 10:00

## 2024-03-20 RX ADMIN — MUPIROCIN 1 APPLICATION(S): 20 OINTMENT TOPICAL at 07:13

## 2024-03-20 RX ADMIN — Medication 4: at 21:54

## 2024-03-20 RX ADMIN — INSULIN GLARGINE 18 UNIT(S): 100 INJECTION, SOLUTION SUBCUTANEOUS at 21:46

## 2024-03-20 RX ADMIN — Medication 50 MILLILITER(S): at 19:25

## 2024-03-20 RX ADMIN — INSULIN HUMAN 1 UNIT(S)/HR: 100 INJECTION, SOLUTION SUBCUTANEOUS at 06:48

## 2024-03-20 RX ADMIN — CHLORHEXIDINE GLUCONATE 15 MILLILITER(S): 213 SOLUTION TOPICAL at 07:12

## 2024-03-20 RX ADMIN — Medication 5.03 MICROGRAM(S)/KG/MIN: at 06:48

## 2024-03-20 RX ADMIN — Medication 500 MILLIGRAM(S): at 18:01

## 2024-03-20 RX ADMIN — HEPARIN SODIUM 5000 UNIT(S): 5000 INJECTION INTRAVENOUS; SUBCUTANEOUS at 21:46

## 2024-03-20 RX ADMIN — PANTOPRAZOLE SODIUM 40 MILLIGRAM(S): 20 TABLET, DELAYED RELEASE ORAL at 06:47

## 2024-03-20 RX ADMIN — MUPIROCIN 1 APPLICATION(S): 20 OINTMENT TOPICAL at 21:57

## 2024-03-20 RX ADMIN — Medication 400 MILLIGRAM(S): at 00:48

## 2024-03-20 RX ADMIN — GABAPENTIN 100 MILLIGRAM(S): 400 CAPSULE ORAL at 21:46

## 2024-03-20 RX ADMIN — Medication 40 MILLIEQUIVALENT(S): at 18:34

## 2024-03-20 RX ADMIN — SENNA PLUS 2 TABLET(S): 8.6 TABLET ORAL at 21:46

## 2024-03-20 RX ADMIN — Medication 1000 MILLIGRAM(S): at 07:10

## 2024-03-20 RX ADMIN — MILRINONE LACTATE 4.19 MICROGRAM(S)/KG/MIN: 1 INJECTION, SOLUTION INTRAVENOUS at 06:48

## 2024-03-20 RX ADMIN — HEPARIN SODIUM 5000 UNIT(S): 5000 INJECTION INTRAVENOUS; SUBCUTANEOUS at 06:47

## 2024-03-20 RX ADMIN — Medication 6: at 17:58

## 2024-03-20 RX ADMIN — GABAPENTIN 100 MILLIGRAM(S): 400 CAPSULE ORAL at 06:47

## 2024-03-20 RX ADMIN — Medication 400 MILLIGRAM(S): at 06:48

## 2024-03-20 RX ADMIN — Medication 20 MILLIGRAM(S): at 06:46

## 2024-03-20 RX ADMIN — GABAPENTIN 100 MILLIGRAM(S): 400 CAPSULE ORAL at 13:12

## 2024-03-20 RX ADMIN — Medication 1000 MILLIGRAM(S): at 01:05

## 2024-03-20 NOTE — PHYSICAL THERAPY INITIAL EVALUATION ADULT - PERTINENT HX OF CURRENT PROBLEM, REHAB EVAL
73 year old female admitted to [facility] with syncope. CT negative for CVA. Echo showed severe AS. Transferred to [facility] under Dr. Moon for evaluation for TAVR vs BAV. Upon review of structural scans, patient was deemed an inappropriate candidate for TAVR 2/2 size of aorta. CTS consulted for evaluation for AVR.

## 2024-03-20 NOTE — PROGRESS NOTE ADULT - PROBLEM SELECTOR PLAN 1
Type 2 diabetes mellitus with hyperglycemia  - Please increase lantus to 20  units at bedtime.   - Decrease lispro to 3 units with meals - poor appetite. Order glucerna.  - Continue lispro moderate dose sliding scale before meals and at bedtime.  - Patient's fingerstick glucose goal is 100-180 mg/dL.    - Consistent carb diet.  - For discharge, patient can ***.    - Patient can follow up at discharge with [facility] by calling [phone] to make an appointment.      Discussed with Dr Huffman. Primary team updated.

## 2024-03-20 NOTE — PHYSICAL THERAPY INITIAL EVALUATION ADULT - GENERAL OBSERVATIONS, REHAB EVAL
patient received seated out of bed with no acute distress. +EKG +zeyad +central line +chest tube to wall suction x3 +trent  +temporary pacemaker +SCDs +high flow NC +swan reena

## 2024-03-20 NOTE — PROGRESS NOTE ADULT - SUBJECTIVE AND OBJECTIVE BOX
CTICU  CRITICAL  CARE  attending     Hand off received 					   Pertinent clinical, laboratory, radiographic, hemodynamic, echocardiographic, respiratory data, microbiologic data and chart were reviewed and analyzed frequently throughout the course of the day  Patient seen and examined with CTS/ SH attending at bedside  Pt is a 73yr old female with PMH DM, severe AS, HTN, HLd, tx from [facility] 3/13 for surgical evaluation of severe AS. Pt underwent aortic root replacement (Konect 21mm) with Dr. Rhoades 3/19/24. Given 4 pRBC, 5 cryo, 2 plasma, 1 plt intraop. Arrived intubated on primacor .25.  started for low CI. Extubated overnight. 3/20: [place] removed.       FAMILY HISTORY:  No pertinent family history in first degree relatives  PAST MEDICAL & SURGICAL HISTORY:  Aortic stenosis  HTN (hypertension)  DM (diabetes mellitus)  Hyperlipidemia  Anemia  No significant past surgical history        Patient is a 73y old  Female who presents with a chief complaint of severe AS.    14 system review was unremarkable    Vital signs, hemodynamic and respiratory parameters were reviewed from the bedside nursing flowsheet.  ICU Vital Signs Last 24 Hrs  T(C): 36.6 (20 Mar 2024 17:22), Max: 36.9 (19 Mar 2024 22:00)  T(F): 97.9 (20 Mar 2024 17:22), Max: 98.4 (19 Mar 2024 22:00)  HR: 94 (20 Mar 2024 20:00) (67 - 97)  BP: --  BP(mean): --  ABP: 148/51 (20 Mar 2024 20:00) (97/47 - 166/55)  ABP(mean): 76 (20 Mar 2024 20:00) (62 - 83)  RR: 18 (20 Mar 2024 20:00) (15 - 18)  SpO2: 99% (20 Mar 2024 20:00) (99% - 100%)    O2 Parameters below as of 20 Mar 2024 20:00  Patient On (Oxygen Delivery Method): nasal cannula w/ humidification  O2 Flow (L/min): 4        Adult Advanced Hemodynamics Last 24 Hrs  CVP(mm Hg): 239 (20 Mar 2024 20:00) (4 - 239)  CVP(cm H2O): --  CO: 4.2 (20 Mar 2024 14:00) (2.3 - 4.2)  CI: 2.6 (20 Mar 2024 14:00) (1.4 - 2.6)  PA: 41/17 (20 Mar 2024 18:00) (28/12 - 48/17)  PA(mean): 26 (20 Mar 2024 18:00) (17 - 28)  PCWP: --  SVR: 1217 (20 Mar 2024 14:00) (6164 - 1875)  SVRI: 1966 (20 Mar 2024 14:00) (3863 - 3081)  PVR: --  PVRI: --, ABG - ( 20 Mar 2024 15:29 )  pH, Arterial: 7.41  pH, Blood: x     /  pCO2: 38    /  pO2: 114   / HCO3: 24    / Base Excess: -0.3  /  SaO2: 96.0                Intake and output was reviewed and the fluid balance was calculated  Daily     Daily   I&O's Summary    19 Mar 2024 07:01  -  20 Mar 2024 07:00  --------------------------------------------------------  IN: 2618.6 mL / OUT: 1720 mL / NET: 898.6 mL    20 Mar 2024 07:01  -  20 Mar 2024 20:51  --------------------------------------------------------  IN: 516.8 mL / OUT: 1305 mL / NET: -788.2 mL        All lines and drain sites were assessed  Glycemic trend was reviewedCAPILLARY BLOOD GLUCOSE      POCT Blood Glucose.: 251 mg/dL (20 Mar 2024 17:47)      Neuro: pleasant female nad  HEENT: mmm  Heart: s1 s2  Lungs: clear bl  Abdomen: soft nt nd  Extremities: wwp    Lines:  RIj Cordis 3/19  R radial arterial line 3/19    Tubes:  Med x 3    labs  CBC Full  -  ( 20 Mar 2024 15:32 )  WBC Count : 11.38 K/uL  RBC Count : 3.53 M/uL  Hemoglobin : 9.7 g/dL  Hematocrit : 29.3 %  Platelet Count - Automated : 256 K/uL  Mean Cell Volume : 83.0 fl  Mean Cell Hemoglobin : 27.5 pg  Mean Cell Hemoglobin Concentration : 33.1 gm/dL  Auto Neutrophil # : 8.91 K/uL  Auto Lymphocyte # : 1.05 K/uL  Auto Monocyte # : 1.31 K/uL  Auto Eosinophil # : 0.02 K/uL  Auto Basophil # : 0.02 K/uL  Auto Neutrophil % : 78.3 %  Auto Lymphocyte % : 9.2 %  Auto Monocyte % : 11.5 %  Auto Eosinophil % : 0.2 %  Auto Basophil % : 0.2 %    03-20    142  |  107  |  8   ----------------------------<  207<H>  3.9   |  21<L>  |  0.61    Ca    8.7      20 Mar 2024 15:32  Phos  4.0     03-20  Mg     2.3     03-20    TPro  5.7<L>  /  Alb  3.8  /  TBili  0.5  /  DBili  x   /  AST  56<H>  /  ALT  23  /  AlkPhos  51  03-20    PT/INR - ( 20 Mar 2024 15:32 )   PT: 13.6 sec;   INR: 1.20          PTT - ( 20 Mar 2024 15:32 )  PTT:31.3 sec  The current medications were reviewed   MEDICATIONS  (STANDING):  ascorbic acid 500 milliGRAM(s) Oral two times a day  aspirin enteric coated 81 milliGRAM(s) Oral daily  chlorhexidine 0.12% Liquid 15 milliLiter(s) Oral Mucosa every 12 hours  chlorhexidine 2% Cloths 1 Application(s) Topical daily  dextrose 5%. 1000 milliLiter(s) (50 mL/Hr) IV Continuous <Continuous>  dextrose 5%. 1000 milliLiter(s) (100 mL/Hr) IV Continuous <Continuous>  dextrose 50% Injectable 25 Gram(s) IV Push once  dextrose 50% Injectable 50 milliLiter(s) IV Push every 15 minutes  dextrose 50% Injectable 25 milliLiter(s) IV Push every 15 minutes  DOBUTamine Infusion 3 MICROgram(s)/kG/Min (5.03 mL/Hr) IV Continuous <Continuous>  furosemide   Injectable 20 milliGRAM(s) IV Push every 8 hours  gabapentin 100 milliGRAM(s) Oral three times a day  glucagon  Injectable 1 milliGRAM(s) IntraMuscular once  heparin   Injectable 5000 Unit(s) SubCutaneous every 8 hours  insulin glargine Injectable (LANTUS) 18 Unit(s) SubCutaneous at bedtime  insulin lispro (ADMELOG) corrective regimen sliding scale   SubCutaneous Before meals and at bedtime  insulin lispro Injectable (ADMELOG) 3 Unit(s) SubCutaneous three times a day before meals  milrinone Infusion 0.25 MICROgram(s)/kG/Min (4.19 mL/Hr) IV Continuous <Continuous>  mupirocin 2% Ointment 1 Application(s) Both Nostrils two times a day  norepinephrine Infusion 0.05 MICROgram(s)/kG/Min (5.24 mL/Hr) IV Continuous <Continuous>  pantoprazole    Tablet 40 milliGRAM(s) Oral before breakfast  polyethylene glycol 3350 17 Gram(s) Oral daily  senna 2 Tablet(s) Oral at bedtime  sodium chloride 0.9%. 1000 milliLiter(s) (10 mL/Hr) IV Continuous <Continuous>    MEDICATIONS  (PRN):  dextrose Oral Gel 15 Gram(s) Oral once PRN Blood Glucose LESS THAN 70 milliGRAM(s)/deciliter  oxyCODONE    IR 5 milliGRAM(s) Oral every 6 hours PRN Moderate Pain (4 - 6)  oxyCODONE    IR 10 milliGRAM(s) Oral every 6 hours PRN Severe Pain (7 - 10)      Assessment/Plan:  73yr old female with PMH DM, severe AS, HTN, HLd, tx from [facility] 3/13 for surgical evaluation of severe AS.     Pt underwent aortic root replacement (Konect 21mm, nl EF, [place], 3/19/24)  Cardiogenic shock on  and primacor-wean as tolerated  Trend end organ perfusion markers  Acute post operative anemia due to acute blood loss-trend H/H  Aspirin  Statin  Periop abx  Hyperglycemia-insulin per protocol  Diet as tolerated  Replete lytes prn  Monitor CT output  GI/DVT PPX  Bowel Regimen  Pain control  OOB with PT  Close hemodynamic, ventilatory and drain monitoring and management per post op routine  Monitor for arrhythmias and monitor parameters for organ perfusion  Beta blockade not administered due to hemodynamic instability and bradycardia  Monitor neurologic status  Head of the bed should remain elevated to 45 deg   Chest PT and IS will be encouraged  Monitor adequacy of oxygenation and ventilation and attempt to wean oxygen  Antibiotic regimen will be tailored to the clinical, laboratory and microbiologic data  Nutritional goals will be met using po eventually, ensure adequate caloric intake and monitor the same  Stress ulcer and VTE prophylaxis will be achieved    Glycemic control is satisfactory  Electrolytes have been repleted as necessary and wound care has been carried out   Pain control has been achieved.   Aggressive physical therapy and early mobility and ambulation goals will be met   The family was updated about the course and plan.    CRITICAL CARE TIME personally provided by me  in evaluation and management, reassessments, review and interpretation of labs and x-rays, ventilator and hemodynamic management, formulating a plan and coordinating care: ___60____ MIN.  Time does not include procedural time.            CTICU ATTENDING     					  Sydney Solis MD

## 2024-03-20 NOTE — PHYSICAL THERAPY INITIAL EVALUATION ADULT - GAIT DEVIATIONS NOTED, PT EVAL
forward flexed posture, mod assist for navigation, unsteady at times, verbal cues for safety awareness/posture/navigation

## 2024-03-20 NOTE — PROGRESS NOTE ADULT - SUBJECTIVE AND OBJECTIVE BOX
CTICU  CRITICAL  CARE  attending     Hand off received 					   Pertinent clinical, laboratory, radiographic, hemodynamic, echocardiographic, respiratory data, microbiologic data and chart were reviewed and analyzed frequently throughout the course of the day and night  Patient seen and examined with CTS/ SH attending at bedside  Pt is a 73y , Female, HEALTH ISSUES - PROBLEM Dx:  Aortic stenosis, severe    HTN (hypertension)    Hyperlipidemia    Type 2 diabetes mellitus        , FAMILY HISTORY:  No pertinent family history in first degree relatives    PAST MEDICAL & SURGICAL HISTORY:  Aortic stenosis      HTN (hypertension)      DM (diabetes mellitus)      Hyperlipidemia      Anemia      No significant past surgical history        Patient is a 73y old  Female who presents with a chief complaint of severe AS (20 Mar 2024 20:50)      14 system review was unremarkable    Vital signs, hemodynamic and respiratory parameters were reviewed from the bedside nursing flowsheet.  ICU Vital Signs Last 24 Hrs  T(C): 37.2 (20 Mar 2024 22:21), Max: 37.2 (20 Mar 2024 22:21)  T(F): 98.9 (20 Mar 2024 22:21), Max: 98.9 (20 Mar 2024 22:21)  HR: 94 (20 Mar 2024 23:00) (76 - 97)  BP: --  BP(mean): --  ABP: 132/48 (20 Mar 2024 23:00) (115/46 - 166/55)  ABP(mean): 73 (20 Mar 2024 23:00) (63 - 83)  RR: 16 (20 Mar 2024 23:00) (15 - 18)  SpO2: 98% (20 Mar 2024 23:00) (98% - 100%)    O2 Parameters below as of 20 Mar 2024 23:00  Patient On (Oxygen Delivery Method): nasal cannula w/ humidification  O2 Flow (L/min): 2        Adult Advanced Hemodynamics Last 24 Hrs  CVP(mm Hg): 10 (20 Mar 2024 23:00) (4 - 239)  CVP(cm H2O): --  CO: 4.2 (20 Mar 2024 14:00) (3.2 - 4.2)  CI: 2.6 (20 Mar 2024 14:00) (2 - 2.6)  PA: 41/17 (20 Mar 2024 18:00) (35/10 - 48/17)  PA(mean): 26 (20 Mar 2024 18:00) (17 - 27)  PCWP: --  SVR: 1217 (20 Mar 2024 14:00) (8842 - 1498)  SVRI: 1966 (20 Mar 2024 14:00) (2526 - 6659)  PVR: --  PVRI: --, ABG - ( 20 Mar 2024 23:01 )  pH, Arterial: 7.44  pH, Blood: x     /  pCO2: 37    /  pO2: 85    / HCO3: 25    / Base Excess: 1.1   /  SaO2: 98.7                Intake and output was reviewed and the fluid balance was calculated  Daily     Daily   I&O's Summary    19 Mar 2024 07:01  -  20 Mar 2024 07:00  --------------------------------------------------------  IN: 2618.6 mL / OUT: 1720 mL / NET: 898.6 mL    20 Mar 2024 07:01  -  20 Mar 2024 23:40  --------------------------------------------------------  IN: 686 mL / OUT: 1815 mL / NET: -1129 mL        All lines and drain sites were assessed  [facility]      POCT Blood Glucose.: 214 mg/dL (20 Mar 2024 21:51)    No acute change in mental status  Auscultation of the chest reveals equal bs  Abdomen is soft  Extremities are warm and well perfused  Wounds appear clean and unremarkable  Antibiotics are periop    labs  CBC Full  -  ( 20 Mar 2024 23:11 )  WBC Count : 11.90 K/uL  RBC Count : 3.35 M/uL  Hemoglobin : 9.1 g/dL  Hematocrit : 28.1 %  Platelet Count - Automated : 263 K/uL  Mean Cell Volume : 83.9 fl  Mean Cell Hemoglobin : 27.2 pg  Mean Cell Hemoglobin Concentration : 32.4 gm/dL  Auto Neutrophil # : 9.40 K/uL  Auto Lymphocyte # : 0.91 K/uL  Auto Monocyte # : 1.49 K/uL  Auto Eosinophil # : 0.01 K/uL  Auto Basophil # : 0.01 K/uL  Auto Neutrophil % : 79.0 %  Auto Lymphocyte % : 7.6 %  Auto Monocyte % : 12.5 %  Auto Eosinophil % : 0.1 %  Auto Basophil % : 0.1 %    03-20    x   |  x   |  7   ----------------------------<  240<H>  x    |  24  |  0.66    Ca    8.6      20 Mar 2024 23:11  Phos  3.5     03-20  Mg     2.2     03-20    TPro  5.7<L>  /  Alb  3.8  /  TBili  0.5  /  DBili  x   /  AST  56<H>  /  ALT  23  /  AlkPhos  51  03-20    PT/INR - ( 20 Mar 2024 23:11 )   PT: 13.8 sec;   INR: 1.22          PTT - ( 20 Mar 2024 23:11 )  PTT:34.4 sec  The current medications were reviewed   MEDICATIONS  (STANDING):  ascorbic acid 500 milliGRAM(s) Oral two times a day  aspirin enteric coated 81 milliGRAM(s) Oral daily  chlorhexidine 0.12% Liquid 15 milliLiter(s) Oral Mucosa every 12 hours  chlorhexidine 2% Cloths 1 Application(s) Topical daily  dextrose 5%. 1000 milliLiter(s) (100 mL/Hr) IV Continuous <Continuous>  dextrose 5%. 1000 milliLiter(s) (50 mL/Hr) IV Continuous <Continuous>  dextrose 50% Injectable 25 Gram(s) IV Push once  dextrose 50% Injectable 50 milliLiter(s) IV Push every 15 minutes  dextrose 50% Injectable 25 milliLiter(s) IV Push every 15 minutes  DOBUTamine Infusion 2 MICROgram(s)/kG/Min (3.35 mL/Hr) IV Continuous <Continuous>  furosemide   Injectable 20 milliGRAM(s) IV Push every 8 hours  gabapentin 100 milliGRAM(s) Oral three times a day  glucagon  Injectable 1 milliGRAM(s) IntraMuscular once  heparin   Injectable 5000 Unit(s) SubCutaneous every 8 hours  insulin glargine Injectable (LANTUS) 18 Unit(s) SubCutaneous at bedtime  insulin lispro (ADMELOG) corrective regimen sliding scale   SubCutaneous Before meals and at bedtime  insulin lispro Injectable (ADMELOG) 3 Unit(s) SubCutaneous three times a day before meals  milrinone Infusion 0.25 MICROgram(s)/kG/Min (4.19 mL/Hr) IV Continuous <Continuous>  mupirocin 2% Ointment 1 Application(s) Both Nostrils two times a day  norepinephrine Infusion 0.05 MICROgram(s)/kG/Min (5.24 mL/Hr) IV Continuous <Continuous>  pantoprazole    Tablet 40 milliGRAM(s) Oral before breakfast  polyethylene glycol 3350 17 Gram(s) Oral daily  senna 2 Tablet(s) Oral at bedtime  sodium chloride 0.9%. 1000 milliLiter(s) (10 mL/Hr) IV Continuous <Continuous>    MEDICATIONS  (PRN):  dextrose Oral Gel 15 Gram(s) Oral once PRN Blood Glucose LESS THAN 70 milliGRAM(s)/deciliter  oxyCODONE    IR 5 milliGRAM(s) Oral every 6 hours PRN Moderate Pain (4 - 6)  oxyCODONE    IR 10 milliGRAM(s) Oral every 6 hours PRN Severe Pain (7 - 10)       PROBLEM LIST/ ASSESSMENT:  HEALTH ISSUES - PROBLEM Dx:  Aortic stenosis, severe    HTN (hypertension)    Hyperlipidemia    Type 2 diabetes mellitus        ,   Patient is a 73y old  Female who presents with a chief complaint of severe AS (20 Mar 2024 20:50)     s/p cardiac surgery    Acute systolic and diastolic heart failure evidenced by SOB and parenchymal infiltrates; will treat with diuresis    Cardiogenic shock on ionotropy      Acute post operative pulmonary insufficiency ruled in due to hypoxemia, O2 sats < 91% on RA treated with HFNC    Acidosis evidenced by anion gap and negative base excess          My plan includes :  close hemodynamic, ventilatory and drain monitoring and management per post op routine    Monitor for arrhythmias and monitor parameters for organ perfusion  beta blockade not administered due to hemodynamic instability and bradycardia  monitor neurologic status  Head of the bed should remain elevated to 45 deg .   chest PT and IS will be encouraged  monitor adequacy of oxygenation and ventilation and attempt to wean oxygen  antibiotic regimen will be tailored to the clinical, laboratory and microbiologic data  Nutritional goals will be met using po eventually , ensure adequate caloric intake and montior the same  Stress ulcer and VTE prophylaxis will be achieved    Glycemic control is satisfactory  Electrolytes have been repleted as necessary and wound care has been carried out. Pain control has been achieved.   agressive physical therapy and early mobility and ambulation goals will be met   The family was updated about the course and plan  CRITICAL CARE TIME Upon my evaluation, this patient had a high probability of imminent or life-threatening deterioration due to the above problems which required my direct attention, intervention, and personal management.  I have personally provided 150 minutes of critical care time exclusive of time spent on separately billable procedures. Time included review of laboratory data, radiology results, discussion with consultants, and monitoring for potential decompensation. Interventions were performed as documented abovepersonally provided by me  in evaluation and management, reassessments, review and interpretation of labs and x-rays, ventilator and hemodynamic management, formulating a plan and coordinating care: ___150___ MIN.  Time does not include procedural time.    CTICU ATTENDING     					    Axel Sinha MD

## 2024-03-20 NOTE — PROGRESS NOTE ADULT - ASSESSMENT
72 y/o female PMH DM, severe AS, EF 60%, HTN, HLD, anemia who was admitted to [facility] with syncope. CT negative for CVA, but echo showed severe AS planned for SAVR 3/19/2024.    Endocrine consulted for T2 diabetes management.     A1C: 9.6 %  C-peptide 1.8 with  - March 2024  BUN: 8  Creatinine: 0.61  GFR: 96  Weight: 55.9  BMI: 22.5  EF: 60%

## 2024-03-20 NOTE — PROGRESS NOTE ADULT - SUBJECTIVE AND OBJECTIVE BOX
SUBJECTIVE / INTERVAL HPI: Patient was seen and examined this morning. S/P AVR on 3/19. On insulin drip overnight max 6.5 units/hr. On HF NC. Denies pain or SOB. Didn't eat breakfast, and was nauseated with a little lunch.    CAPILLARY BLOOD GLUCOSE & INSULIN RECEIVED  215 mg/dL (03-19 @ 16:18)  232 mg/dL (03-19 @ 17:50)  250 mg/dL (03-19 @ 18:59)  170 mg/dL (03-19 @ 19:57)  210 mg/dL (03-19 @ 20:43)  198 mg/dL (03-19 @ 21:02)  161 mg/dL (03-19 @ 22:16)  153 mg/dL (03-19 @ 22:32)  150 mg/dL (03-19 @ 23:10)  143 mg/dL (03-20 @ 00:03)  117 mg/dL (03-20 @ 00:51)  122 mg/dL (03-20 @ 02:10)  132 mg/dL (03-20 @ 02:58)  148 mg/dL (03-20 @ 03:09)  131 mg/dL (03-20 @ 04:02)  130 mg/dL (03-20 @ 05:11)  151 mg/dL (03-20 @ 06:15)  128 mg/dL (03-20 @ 07:02)  133 mg/dL (03-20 @ 07:54)  88 mg/dL (03-20 @ 09:08)  134 mg/dL (03-20 @ 10:42)  207 mg/dL (03-20 @ 15:32)  251 mg/dL (03-20 @ 17:47)      REVIEW OF SYSTEMS  Constitutional:  Negative fever, chills or loss of appetite.  Eyes:  Negative blurry vision or double vision.  Cardiovascular:  Negative for chest pain or palpitations.  Respiratory:  Negative for cough, wheezing, or shortness of breath.    Gastrointestinal:  Negative for nausea, vomiting, diarrhea, constipation, or abdominal pain.  Genitourinary:  Negative frequency, urgency or dysuria.  Neurologic:  No headache, confusion, dizziness, lightheadedness.    PHYSICAL EXAM  Vital Signs Last 24 Hrs  T(C): 36.6 (20 Mar 2024 17:22), Max: 36.9 (19 Mar 2024 22:00)  T(F): 97.9 (20 Mar 2024 17:22), Max: 98.4 (19 Mar 2024 22:00)  HR: 93 (20 Mar 2024 17:00) (65 - 93)  BP: --  BP(mean): --  RR: 18 (20 Mar 2024 17:00) (14 - 18)  SpO2: 99% (20 Mar 2024 17:00) (99% - 100%)    Parameters below as of 20 Mar 2024 17:00  Patient On (Oxygen Delivery Method): nasal cannula, high flow  O2 Flow (L/min): 50  O2 Concentration (%): 40    Constitutional: Awake, alert, in no acute distress.   HEENT: Normocephalic, atraumatic, GEORGETTE, no proptosis or lid retraction.   Neck: supple, no acanthosis, no thyromegaly or palpable thyroid nodules.  Respiratory: Lungs clear to ausculation bilaterally. + CT  Cardiovascular: regular rhythm, normal S1 and S2, + murmur  GI: soft, non-tender, non-distended, bowel sounds present, no masses appreciated.  Extremities: No lower extremity edema, peripheral pulses present.   Skin: no rashes.   Psychiatric: AAO x 3. Normal affect/mood.     LABS  CBC - WBC/HGB/HTC/PLT: 11.38/9.7/29.3/256 (03-20-24)  BMP - Na/K/Cl/Bicarb/BUN/Cr/Gluc/AG/eGFR: 142/3.9/107/21/8/0.61/207/14/96 (03-20-24)  Ca - 8.7 (03-20-24)  Phos - 4.0 (03-20-24)  Mg - 2.3 (03-20-24)  LFT - Alb/Tprot/Tbili/Dbili/AlkPhos/ALT/AST: 3.8/--/0.5/--/51/23/56 (03-20-24)  PT/aPTT/INR: 13.6/31.3/1.20 (03-20-24)   Thyroid Stimulating Hormone, Serum: 1.530 (03-12-24)      MEDICATIONS  MEDICATIONS  (STANDING):  acetaminophen   IVPB .. 1000 milliGRAM(s) IV Intermittent every 6 hours  ascorbic acid 500 milliGRAM(s) Oral two times a day  aspirin enteric coated 81 milliGRAM(s) Oral daily  chlorhexidine 0.12% Liquid 15 milliLiter(s) Oral Mucosa every 12 hours  chlorhexidine 2% Cloths 1 Application(s) Topical daily  dextrose 5%. 1000 milliLiter(s) (50 mL/Hr) IV Continuous <Continuous>  dextrose 5%. 1000 milliLiter(s) (100 mL/Hr) IV Continuous <Continuous>  dextrose 50% Injectable 25 Gram(s) IV Push once  dextrose 50% Injectable 50 milliLiter(s) IV Push every 15 minutes  dextrose 50% Injectable 25 milliLiter(s) IV Push every 15 minutes  DOBUTamine Infusion 3 MICROgram(s)/kG/Min (5.03 mL/Hr) IV Continuous <Continuous>  gabapentin 100 milliGRAM(s) Oral three times a day  glucagon  Injectable 1 milliGRAM(s) IntraMuscular once  heparin   Injectable 5000 Unit(s) SubCutaneous every 8 hours  insulin glargine Injectable (LANTUS) 18 Unit(s) SubCutaneous at bedtime  insulin lispro (ADMELOG) corrective regimen sliding scale   SubCutaneous Before meals and at bedtime  insulin lispro Injectable (ADMELOG) 3 Unit(s) SubCutaneous three times a day before meals  milrinone Infusion 0.25 MICROgram(s)/kG/Min (4.19 mL/Hr) IV Continuous <Continuous>  mupirocin 2% Ointment 1 Application(s) Both Nostrils two times a day  norepinephrine Infusion 0.05 MICROgram(s)/kG/Min (5.24 mL/Hr) IV Continuous <Continuous>  pantoprazole    Tablet 40 milliGRAM(s) Oral before breakfast  polyethylene glycol 3350 17 Gram(s) Oral daily  senna 2 Tablet(s) Oral at bedtime  sodium chloride 0.9%. 1000 milliLiter(s) (10 mL/Hr) IV Continuous <Continuous>    MEDICATIONS  (PRN):  dextrose Oral Gel 15 Gram(s) Oral once PRN Blood Glucose LESS THAN 70 milliGRAM(s)/deciliter  oxyCODONE    IR 5 milliGRAM(s) Oral every 6 hours PRN Moderate Pain (4 - 6)  oxyCODONE    IR 10 milliGRAM(s) Oral every 6 hours PRN Severe Pain (7 - 10)

## 2024-03-21 LAB
ALBUMIN SERPL ELPH-MCNC: 3.6 G/DL — SIGNIFICANT CHANGE UP (ref 3.3–5)
ALBUMIN SERPL ELPH-MCNC: 3.8 G/DL — SIGNIFICANT CHANGE UP (ref 3.3–5)
ALBUMIN SERPL ELPH-MCNC: 3.9 G/DL — SIGNIFICANT CHANGE UP (ref 3.3–5)
ALP SERPL-CCNC: 49 U/L — SIGNIFICANT CHANGE UP (ref 40–120)
ALP SERPL-CCNC: 51 U/L — SIGNIFICANT CHANGE UP (ref 40–120)
ALP SERPL-CCNC: 58 U/L — SIGNIFICANT CHANGE UP (ref 40–120)
ALT FLD-CCNC: 20 U/L — SIGNIFICANT CHANGE UP (ref 10–45)
ALT FLD-CCNC: 20 U/L — SIGNIFICANT CHANGE UP (ref 10–45)
ALT FLD-CCNC: 21 U/L — SIGNIFICANT CHANGE UP (ref 10–45)
ANION GAP SERPL CALC-SCNC: 10 MMOL/L — SIGNIFICANT CHANGE UP (ref 5–17)
ANION GAP SERPL CALC-SCNC: 9 MMOL/L — SIGNIFICANT CHANGE UP (ref 5–17)
ANION GAP SERPL CALC-SCNC: 9 MMOL/L — SIGNIFICANT CHANGE UP (ref 5–17)
ANISOCYTOSIS BLD QL: SLIGHT — SIGNIFICANT CHANGE UP
APTT BLD: 31.7 SEC — SIGNIFICANT CHANGE UP (ref 24.5–35.6)
APTT BLD: 32.1 SEC — SIGNIFICANT CHANGE UP (ref 24.5–35.6)
APTT BLD: 33 SEC — SIGNIFICANT CHANGE UP (ref 24.5–35.6)
AST SERPL-CCNC: 29 U/L — SIGNIFICANT CHANGE UP (ref 10–40)
AST SERPL-CCNC: 31 U/L — SIGNIFICANT CHANGE UP (ref 10–40)
AST SERPL-CCNC: 39 U/L — SIGNIFICANT CHANGE UP (ref 10–40)
BASOPHILS # BLD AUTO: 0 K/UL — SIGNIFICANT CHANGE UP (ref 0–0.2)
BASOPHILS # BLD AUTO: 0.03 K/UL — SIGNIFICANT CHANGE UP (ref 0–0.2)
BASOPHILS # BLD AUTO: 0.03 K/UL — SIGNIFICANT CHANGE UP (ref 0–0.2)
BASOPHILS NFR BLD AUTO: 0 % — SIGNIFICANT CHANGE UP (ref 0–2)
BASOPHILS NFR BLD AUTO: 0.2 % — SIGNIFICANT CHANGE UP (ref 0–2)
BASOPHILS NFR BLD AUTO: 0.2 % — SIGNIFICANT CHANGE UP (ref 0–2)
BILIRUB SERPL-MCNC: 0.4 MG/DL — SIGNIFICANT CHANGE UP (ref 0.2–1.2)
BILIRUB SERPL-MCNC: 0.4 MG/DL — SIGNIFICANT CHANGE UP (ref 0.2–1.2)
BILIRUB SERPL-MCNC: 0.5 MG/DL — SIGNIFICANT CHANGE UP (ref 0.2–1.2)
BUN SERPL-MCNC: 10 MG/DL — SIGNIFICANT CHANGE UP (ref 7–23)
BUN SERPL-MCNC: 8 MG/DL — SIGNIFICANT CHANGE UP (ref 7–23)
BUN SERPL-MCNC: 9 MG/DL — SIGNIFICANT CHANGE UP (ref 7–23)
BURR CELLS BLD QL SMEAR: PRESENT — SIGNIFICANT CHANGE UP
CALCIUM SERPL-MCNC: 8.5 MG/DL — SIGNIFICANT CHANGE UP (ref 8.4–10.5)
CALCIUM SERPL-MCNC: 8.8 MG/DL — SIGNIFICANT CHANGE UP (ref 8.4–10.5)
CALCIUM SERPL-MCNC: 9 MG/DL — SIGNIFICANT CHANGE UP (ref 8.4–10.5)
CHLORIDE SERPL-SCNC: 106 MMOL/L — SIGNIFICANT CHANGE UP (ref 96–108)
CHLORIDE SERPL-SCNC: 106 MMOL/L — SIGNIFICANT CHANGE UP (ref 96–108)
CHLORIDE SERPL-SCNC: 108 MMOL/L — SIGNIFICANT CHANGE UP (ref 96–108)
CO2 SERPL-SCNC: 24 MMOL/L — SIGNIFICANT CHANGE UP (ref 22–31)
CO2 SERPL-SCNC: 25 MMOL/L — SIGNIFICANT CHANGE UP (ref 22–31)
CO2 SERPL-SCNC: 25 MMOL/L — SIGNIFICANT CHANGE UP (ref 22–31)
CREAT SERPL-MCNC: 0.63 MG/DL — SIGNIFICANT CHANGE UP (ref 0.5–1.3)
CREAT SERPL-MCNC: 0.67 MG/DL — SIGNIFICANT CHANGE UP (ref 0.5–1.3)
CREAT SERPL-MCNC: 0.87 MG/DL — SIGNIFICANT CHANGE UP (ref 0.5–1.3)
EGFR: 71 ML/MIN/1.73M2 — SIGNIFICANT CHANGE UP
EGFR: 93 ML/MIN/1.73M2 — SIGNIFICANT CHANGE UP
EGFR: 95 ML/MIN/1.73M2 — SIGNIFICANT CHANGE UP
EOSINOPHIL # BLD AUTO: 0 K/UL — SIGNIFICANT CHANGE UP (ref 0–0.5)
EOSINOPHIL # BLD AUTO: 0.01 K/UL — SIGNIFICANT CHANGE UP (ref 0–0.5)
EOSINOPHIL # BLD AUTO: 0.02 K/UL — SIGNIFICANT CHANGE UP (ref 0–0.5)
EOSINOPHIL NFR BLD AUTO: 0 % — SIGNIFICANT CHANGE UP (ref 0–6)
EOSINOPHIL NFR BLD AUTO: 0.1 % — SIGNIFICANT CHANGE UP (ref 0–6)
EOSINOPHIL NFR BLD AUTO: 0.1 % — SIGNIFICANT CHANGE UP (ref 0–6)
GAS PNL BLDA: SIGNIFICANT CHANGE UP
GLUCOSE BLDC GLUCOMTR-MCNC: 264 MG/DL — HIGH (ref 70–99)
GLUCOSE BLDC GLUCOMTR-MCNC: 281 MG/DL — HIGH (ref 70–99)
GLUCOSE BLDC GLUCOMTR-MCNC: 294 MG/DL — HIGH (ref 70–99)
GLUCOSE BLDC GLUCOMTR-MCNC: 316 MG/DL — HIGH (ref 70–99)
GLUCOSE SERPL-MCNC: 235 MG/DL — HIGH (ref 70–99)
GLUCOSE SERPL-MCNC: 284 MG/DL — HIGH (ref 70–99)
GLUCOSE SERPL-MCNC: 350 MG/DL — HIGH (ref 70–99)
HCT VFR BLD CALC: 28 % — LOW (ref 34.5–45)
HCT VFR BLD CALC: 29.4 % — LOW (ref 34.5–45)
HCT VFR BLD CALC: 29.5 % — LOW (ref 34.5–45)
HGB BLD-MCNC: 9.3 G/DL — LOW (ref 11.5–15.5)
HGB BLD-MCNC: 9.5 G/DL — LOW (ref 11.5–15.5)
HGB BLD-MCNC: 9.5 G/DL — LOW (ref 11.5–15.5)
HYPOCHROMIA BLD QL: SLIGHT — SIGNIFICANT CHANGE UP
IMM GRANULOCYTES NFR BLD AUTO: 0.6 % — SIGNIFICANT CHANGE UP (ref 0–0.9)
IMM GRANULOCYTES NFR BLD AUTO: 0.8 % — SIGNIFICANT CHANGE UP (ref 0–0.9)
INR BLD: 1.13 — SIGNIFICANT CHANGE UP (ref 0.85–1.18)
INR BLD: 1.18 — SIGNIFICANT CHANGE UP (ref 0.85–1.18)
INR BLD: 1.19 — HIGH (ref 0.85–1.18)
LACTATE SERPL-SCNC: 1.4 MMOL/L — SIGNIFICANT CHANGE UP (ref 0.5–2)
LACTATE SERPL-SCNC: 1.5 MMOL/L — SIGNIFICANT CHANGE UP (ref 0.5–2)
LYMPHOCYTES # BLD AUTO: 0.72 K/UL — LOW (ref 1–3.3)
LYMPHOCYTES # BLD AUTO: 1.01 K/UL — SIGNIFICANT CHANGE UP (ref 1–3.3)
LYMPHOCYTES # BLD AUTO: 1.06 K/UL — SIGNIFICANT CHANGE UP (ref 1–3.3)
LYMPHOCYTES # BLD AUTO: 5.2 % — LOW (ref 13–44)
LYMPHOCYTES # BLD AUTO: 6.4 % — LOW (ref 13–44)
LYMPHOCYTES # BLD AUTO: 6.8 % — LOW (ref 13–44)
MAGNESIUM SERPL-MCNC: 2 MG/DL — SIGNIFICANT CHANGE UP (ref 1.6–2.6)
MAGNESIUM SERPL-MCNC: 2.1 MG/DL — SIGNIFICANT CHANGE UP (ref 1.6–2.6)
MAGNESIUM SERPL-MCNC: 2.2 MG/DL — SIGNIFICANT CHANGE UP (ref 1.6–2.6)
MANUAL SMEAR VERIFICATION: SIGNIFICANT CHANGE UP
MCHC RBC-ENTMCNC: 27.1 PG — SIGNIFICANT CHANGE UP (ref 27–34)
MCHC RBC-ENTMCNC: 27.3 PG — SIGNIFICANT CHANGE UP (ref 27–34)
MCHC RBC-ENTMCNC: 27.4 PG — SIGNIFICANT CHANGE UP (ref 27–34)
MCHC RBC-ENTMCNC: 32.2 GM/DL — SIGNIFICANT CHANGE UP (ref 32–36)
MCHC RBC-ENTMCNC: 32.3 GM/DL — SIGNIFICANT CHANGE UP (ref 32–36)
MCHC RBC-ENTMCNC: 33.2 GM/DL — SIGNIFICANT CHANGE UP (ref 32–36)
MCV RBC AUTO: 82.1 FL — SIGNIFICANT CHANGE UP (ref 80–100)
MCV RBC AUTO: 84.3 FL — SIGNIFICANT CHANGE UP (ref 80–100)
MCV RBC AUTO: 84.7 FL — SIGNIFICANT CHANGE UP (ref 80–100)
MICROCYTES BLD QL: SLIGHT — SIGNIFICANT CHANGE UP
MONOCYTES # BLD AUTO: 0.36 K/UL — SIGNIFICANT CHANGE UP (ref 0–0.9)
MONOCYTES # BLD AUTO: 1.84 K/UL — HIGH (ref 0–0.9)
MONOCYTES # BLD AUTO: 1.88 K/UL — HIGH (ref 0–0.9)
MONOCYTES NFR BLD AUTO: 11.7 % — SIGNIFICANT CHANGE UP (ref 2–14)
MONOCYTES NFR BLD AUTO: 12 % — SIGNIFICANT CHANGE UP (ref 2–14)
MONOCYTES NFR BLD AUTO: 2.6 % — SIGNIFICANT CHANGE UP (ref 2–14)
NEUTROPHILS # BLD AUTO: 12.59 K/UL — HIGH (ref 1.8–7.4)
NEUTROPHILS # BLD AUTO: 12.68 K/UL — HIGH (ref 1.8–7.4)
NEUTROPHILS # BLD AUTO: 12.73 K/UL — HIGH (ref 1.8–7.4)
NEUTROPHILS NFR BLD AUTO: 80.3 % — HIGH (ref 43–77)
NEUTROPHILS NFR BLD AUTO: 80.8 % — HIGH (ref 43–77)
NEUTROPHILS NFR BLD AUTO: 92.2 % — HIGH (ref 43–77)
NRBC # BLD: 0 /100 WBCS — SIGNIFICANT CHANGE UP (ref 0–0)
NRBC # BLD: 0 /100 WBCS — SIGNIFICANT CHANGE UP (ref 0–0)
OVALOCYTES BLD QL SMEAR: SLIGHT — SIGNIFICANT CHANGE UP
PHOSPHATE SERPL-MCNC: 1.9 MG/DL — LOW (ref 2.5–4.5)
PHOSPHATE SERPL-MCNC: 2.9 MG/DL — SIGNIFICANT CHANGE UP (ref 2.5–4.5)
PHOSPHATE SERPL-MCNC: 2.9 MG/DL — SIGNIFICANT CHANGE UP (ref 2.5–4.5)
PLAT MORPH BLD: ABNORMAL
PLATELET # BLD AUTO: 267 K/UL — SIGNIFICANT CHANGE UP (ref 150–400)
PLATELET # BLD AUTO: 284 K/UL — SIGNIFICANT CHANGE UP (ref 150–400)
PLATELET # BLD AUTO: 303 K/UL — SIGNIFICANT CHANGE UP (ref 150–400)
POIKILOCYTOSIS BLD QL AUTO: SLIGHT — SIGNIFICANT CHANGE UP
POLYCHROMASIA BLD QL SMEAR: SIGNIFICANT CHANGE UP
POTASSIUM SERPL-MCNC: 3.9 MMOL/L — SIGNIFICANT CHANGE UP (ref 3.5–5.3)
POTASSIUM SERPL-MCNC: 4 MMOL/L — SIGNIFICANT CHANGE UP (ref 3.5–5.3)
POTASSIUM SERPL-MCNC: 4.1 MMOL/L — SIGNIFICANT CHANGE UP (ref 3.5–5.3)
POTASSIUM SERPL-SCNC: 3.9 MMOL/L — SIGNIFICANT CHANGE UP (ref 3.5–5.3)
POTASSIUM SERPL-SCNC: 4 MMOL/L — SIGNIFICANT CHANGE UP (ref 3.5–5.3)
POTASSIUM SERPL-SCNC: 4.1 MMOL/L — SIGNIFICANT CHANGE UP (ref 3.5–5.3)
PROT SERPL-MCNC: 5.6 G/DL — LOW (ref 6–8.3)
PROT SERPL-MCNC: 5.8 G/DL — LOW (ref 6–8.3)
PROT SERPL-MCNC: 5.8 G/DL — LOW (ref 6–8.3)
PROTHROM AB SERPL-ACNC: 12.8 SEC — SIGNIFICANT CHANGE UP (ref 9.5–13)
PROTHROM AB SERPL-ACNC: 13.4 SEC — HIGH (ref 9.5–13)
PROTHROM AB SERPL-ACNC: 13.5 SEC — HIGH (ref 9.5–13)
RBC # BLD: 3.41 M/UL — LOW (ref 3.8–5.2)
RBC # BLD: 3.47 M/UL — LOW (ref 3.8–5.2)
RBC # BLD: 3.5 M/UL — LOW (ref 3.8–5.2)
RBC # FLD: 14.7 % — HIGH (ref 10.3–14.5)
RBC # FLD: 14.7 % — HIGH (ref 10.3–14.5)
RBC # FLD: 14.9 % — HIGH (ref 10.3–14.5)
RBC BLD AUTO: ABNORMAL
SODIUM SERPL-SCNC: 139 MMOL/L — SIGNIFICANT CHANGE UP (ref 135–145)
SODIUM SERPL-SCNC: 140 MMOL/L — SIGNIFICANT CHANGE UP (ref 135–145)
SODIUM SERPL-SCNC: 143 MMOL/L — SIGNIFICANT CHANGE UP (ref 135–145)
WBC # BLD: 13.81 K/UL — HIGH (ref 3.8–10.5)
WBC # BLD: 15.68 K/UL — HIGH (ref 3.8–10.5)
WBC # BLD: 15.7 K/UL — HIGH (ref 3.8–10.5)
WBC # FLD AUTO: 13.81 K/UL — HIGH (ref 3.8–10.5)
WBC # FLD AUTO: 15.68 K/UL — HIGH (ref 3.8–10.5)
WBC # FLD AUTO: 15.7 K/UL — HIGH (ref 3.8–10.5)

## 2024-03-21 PROCEDURE — 99232 SBSQ HOSP IP/OBS MODERATE 35: CPT

## 2024-03-21 PROCEDURE — 71045 X-RAY EXAM CHEST 1 VIEW: CPT | Mod: 26

## 2024-03-21 PROCEDURE — 99291 CRITICAL CARE FIRST HOUR: CPT

## 2024-03-21 PROCEDURE — 99292 CRITICAL CARE ADDL 30 MIN: CPT

## 2024-03-21 PROCEDURE — 71045 X-RAY EXAM CHEST 1 VIEW: CPT | Mod: 26,77

## 2024-03-21 RX ORDER — ALBUMIN HUMAN 25 %
50 VIAL (ML) INTRAVENOUS ONCE
Refills: 0 | Status: COMPLETED | OUTPATIENT
Start: 2024-03-21 | End: 2024-03-21

## 2024-03-21 RX ORDER — AMIODARONE HYDROCHLORIDE 400 MG/1
150 TABLET ORAL ONCE
Refills: 0 | Status: COMPLETED | OUTPATIENT
Start: 2024-03-21 | End: 2024-03-21

## 2024-03-21 RX ORDER — ACETAMINOPHEN 500 MG
975 TABLET ORAL EVERY 6 HOURS
Refills: 0 | Status: DISCONTINUED | OUTPATIENT
Start: 2024-03-21 | End: 2024-03-23

## 2024-03-21 RX ORDER — CEFTRIAXONE 500 MG/1
1000 INJECTION, POWDER, FOR SOLUTION INTRAMUSCULAR; INTRAVENOUS ONCE
Refills: 0 | Status: COMPLETED | OUTPATIENT
Start: 2024-03-21 | End: 2024-03-21

## 2024-03-21 RX ORDER — INSULIN LISPRO 100/ML
5 VIAL (ML) SUBCUTANEOUS
Refills: 0 | Status: DISCONTINUED | OUTPATIENT
Start: 2024-03-21 | End: 2024-03-21

## 2024-03-21 RX ORDER — INSULIN GLARGINE 100 [IU]/ML
20 INJECTION, SOLUTION SUBCUTANEOUS AT BEDTIME
Refills: 0 | Status: DISCONTINUED | OUTPATIENT
Start: 2024-03-21 | End: 2024-03-21

## 2024-03-21 RX ORDER — AMIODARONE HYDROCHLORIDE 400 MG/1
150 TABLET ORAL ONCE
Refills: 0 | Status: COMPLETED | OUTPATIENT
Start: 2024-03-21 | End: 2024-03-22

## 2024-03-21 RX ORDER — MILRINONE LACTATE 1 MG/ML
0.12 INJECTION, SOLUTION INTRAVENOUS
Qty: 20 | Refills: 0 | Status: DISCONTINUED | OUTPATIENT
Start: 2024-03-21 | End: 2024-03-22

## 2024-03-21 RX ORDER — INSULIN GLARGINE 100 [IU]/ML
28 INJECTION, SOLUTION SUBCUTANEOUS AT BEDTIME
Refills: 0 | Status: DISCONTINUED | OUTPATIENT
Start: 2024-03-21 | End: 2024-03-22

## 2024-03-21 RX ORDER — CEFTRIAXONE 500 MG/1
1000 INJECTION, POWDER, FOR SOLUTION INTRAMUSCULAR; INTRAVENOUS EVERY 24 HOURS
Refills: 0 | Status: DISCONTINUED | OUTPATIENT
Start: 2024-03-22 | End: 2024-03-24

## 2024-03-21 RX ORDER — MAGNESIUM OXIDE 400 MG ORAL TABLET 241.3 MG
400 TABLET ORAL ONCE
Refills: 0 | Status: COMPLETED | OUTPATIENT
Start: 2024-03-21 | End: 2024-03-21

## 2024-03-21 RX ORDER — GLYCOPYRROLATE AND FORMOTEROL FUMARATE 9; 4.8 UG/1; UG/1
2 AEROSOL, METERED RESPIRATORY (INHALATION)
Refills: 0 | Status: DISCONTINUED | OUTPATIENT
Start: 2024-03-21 | End: 2024-03-31

## 2024-03-21 RX ORDER — AZITHROMYCIN 500 MG/1
500 TABLET, FILM COATED ORAL EVERY 24 HOURS
Refills: 0 | Status: DISCONTINUED | OUTPATIENT
Start: 2024-03-21 | End: 2024-03-23

## 2024-03-21 RX ORDER — POTASSIUM CHLORIDE 20 MEQ
20 PACKET (EA) ORAL ONCE
Refills: 0 | Status: COMPLETED | OUTPATIENT
Start: 2024-03-21 | End: 2024-03-21

## 2024-03-21 RX ORDER — CEFTRIAXONE 500 MG/1
INJECTION, POWDER, FOR SOLUTION INTRAMUSCULAR; INTRAVENOUS
Refills: 0 | Status: DISCONTINUED | OUTPATIENT
Start: 2024-03-21 | End: 2024-03-24

## 2024-03-21 RX ORDER — FUROSEMIDE 40 MG
20 TABLET ORAL ONCE
Refills: 0 | Status: COMPLETED | OUTPATIENT
Start: 2024-03-21 | End: 2024-03-21

## 2024-03-21 RX ORDER — AMIODARONE HYDROCHLORIDE 400 MG/1
400 TABLET ORAL EVERY 8 HOURS
Refills: 0 | Status: DISCONTINUED | OUTPATIENT
Start: 2024-03-21 | End: 2024-03-23

## 2024-03-21 RX ORDER — HUMAN INSULIN 100 [IU]/ML
6 INJECTION, SUSPENSION SUBCUTANEOUS ONCE
Refills: 0 | Status: COMPLETED | OUTPATIENT
Start: 2024-03-21 | End: 2024-03-21

## 2024-03-21 RX ORDER — INSULIN LISPRO 100/ML
10 VIAL (ML) SUBCUTANEOUS
Refills: 0 | Status: DISCONTINUED | OUTPATIENT
Start: 2024-03-21 | End: 2024-03-22

## 2024-03-21 RX ORDER — AMIODARONE HYDROCHLORIDE 400 MG/1
TABLET ORAL
Refills: 0 | Status: DISCONTINUED | OUTPATIENT
Start: 2024-03-21 | End: 2024-03-23

## 2024-03-21 RX ADMIN — AMIODARONE HYDROCHLORIDE 133.33 MILLIGRAM(S): 400 TABLET ORAL at 04:50

## 2024-03-21 RX ADMIN — AZITHROMYCIN 255 MILLIGRAM(S): 500 TABLET, FILM COATED ORAL at 23:04

## 2024-03-21 RX ADMIN — Medication 975 MILLIGRAM(S): at 17:13

## 2024-03-21 RX ADMIN — GABAPENTIN 100 MILLIGRAM(S): 400 CAPSULE ORAL at 21:56

## 2024-03-21 RX ADMIN — Medication 3 UNIT(S): at 07:42

## 2024-03-21 RX ADMIN — HEPARIN SODIUM 5000 UNIT(S): 5000 INJECTION INTRAVENOUS; SUBCUTANEOUS at 21:56

## 2024-03-21 RX ADMIN — Medication 975 MILLIGRAM(S): at 18:14

## 2024-03-21 RX ADMIN — MUPIROCIN 1 APPLICATION(S): 20 OINTMENT TOPICAL at 17:17

## 2024-03-21 RX ADMIN — HEPARIN SODIUM 5000 UNIT(S): 5000 INJECTION INTRAVENOUS; SUBCUTANEOUS at 15:05

## 2024-03-21 RX ADMIN — Medication 50 MILLILITER(S): at 21:16

## 2024-03-21 RX ADMIN — AMIODARONE HYDROCHLORIDE 133.33 MILLIGRAM(S): 400 TABLET ORAL at 05:31

## 2024-03-21 RX ADMIN — Medication 81 MILLIGRAM(S): at 11:01

## 2024-03-21 RX ADMIN — Medication 20 MILLIEQUIVALENT(S): at 17:13

## 2024-03-21 RX ADMIN — PANTOPRAZOLE SODIUM 40 MILLIGRAM(S): 20 TABLET, DELAYED RELEASE ORAL at 06:26

## 2024-03-21 RX ADMIN — MAGNESIUM OXIDE 400 MG ORAL TABLET 400 MILLIGRAM(S): 241.3 TABLET ORAL at 18:16

## 2024-03-21 RX ADMIN — Medication 20 MILLIGRAM(S): at 18:16

## 2024-03-21 RX ADMIN — MUPIROCIN 1 APPLICATION(S): 20 OINTMENT TOPICAL at 06:32

## 2024-03-21 RX ADMIN — Medication 5.24 MICROGRAM(S)/KG/MIN: at 06:26

## 2024-03-21 RX ADMIN — Medication 500 MILLIGRAM(S): at 17:13

## 2024-03-21 RX ADMIN — Medication 20 MILLIGRAM(S): at 11:55

## 2024-03-21 RX ADMIN — MILRINONE LACTATE 4.19 MICROGRAM(S)/KG/MIN: 1 INJECTION, SOLUTION INTRAVENOUS at 03:23

## 2024-03-21 RX ADMIN — Medication 20 MILLIGRAM(S): at 03:24

## 2024-03-21 RX ADMIN — HUMAN INSULIN 6 UNIT(S): 100 INJECTION, SUSPENSION SUBCUTANEOUS at 11:00

## 2024-03-21 RX ADMIN — AMIODARONE HYDROCHLORIDE 600 MILLIGRAM(S): 400 TABLET ORAL at 04:52

## 2024-03-21 RX ADMIN — Medication 6: at 07:42

## 2024-03-21 RX ADMIN — Medication 8: at 11:00

## 2024-03-21 RX ADMIN — Medication 5 UNIT(S): at 11:00

## 2024-03-21 RX ADMIN — Medication 500 MILLIGRAM(S): at 06:26

## 2024-03-21 RX ADMIN — Medication 6: at 21:56

## 2024-03-21 RX ADMIN — AMIODARONE HYDROCHLORIDE 600 MILLIGRAM(S): 400 TABLET ORAL at 23:13

## 2024-03-21 RX ADMIN — Medication 6: at 16:11

## 2024-03-21 RX ADMIN — CEFTRIAXONE 1000 MILLIGRAM(S): 500 INJECTION, POWDER, FOR SOLUTION INTRAMUSCULAR; INTRAVENOUS at 20:54

## 2024-03-21 RX ADMIN — AMIODARONE HYDROCHLORIDE 600 MILLIGRAM(S): 400 TABLET ORAL at 06:18

## 2024-03-21 RX ADMIN — GLYCOPYRROLATE AND FORMOTEROL FUMARATE 2 PUFF(S): 9; 4.8 AEROSOL, METERED RESPIRATORY (INHALATION) at 21:56

## 2024-03-21 RX ADMIN — AMIODARONE HYDROCHLORIDE 400 MILLIGRAM(S): 400 TABLET ORAL at 21:56

## 2024-03-21 RX ADMIN — GABAPENTIN 100 MILLIGRAM(S): 400 CAPSULE ORAL at 06:25

## 2024-03-21 RX ADMIN — Medication 10 UNIT(S): at 16:11

## 2024-03-21 RX ADMIN — Medication 20 MILLIEQUIVALENT(S): at 18:16

## 2024-03-21 RX ADMIN — SENNA PLUS 2 TABLET(S): 8.6 TABLET ORAL at 21:56

## 2024-03-21 RX ADMIN — GABAPENTIN 100 MILLIGRAM(S): 400 CAPSULE ORAL at 15:05

## 2024-03-21 RX ADMIN — Medication 20 MILLIGRAM(S): at 20:55

## 2024-03-21 RX ADMIN — POLYETHYLENE GLYCOL 3350 17 GRAM(S): 17 POWDER, FOR SOLUTION ORAL at 11:01

## 2024-03-21 RX ADMIN — INSULIN GLARGINE 28 UNIT(S): 100 INJECTION, SOLUTION SUBCUTANEOUS at 21:56

## 2024-03-21 RX ADMIN — HEPARIN SODIUM 5000 UNIT(S): 5000 INJECTION INTRAVENOUS; SUBCUTANEOUS at 06:26

## 2024-03-21 RX ADMIN — AMIODARONE HYDROCHLORIDE 400 MILLIGRAM(S): 400 TABLET ORAL at 15:05

## 2024-03-21 RX ADMIN — Medication 50 MILLILITER(S): at 20:34

## 2024-03-21 NOTE — REVIEW OF SYSTEMS
[Fever] : no fever [Headache] : no headache [Chills] : no chills [Feeling Fatigued] : not feeling fatigued [SOB] : no shortness of breath [Dyspnea on exertion] : not dyspnea during exertion [Chest Discomfort] : no chest discomfort [Lower Ext Edema] : no extremity edema [Leg Claudication] : no intermittent leg claudication [Orthopnea] : no orthopnea [Palpitations] : no palpitations [PND] : no PND [Syncope] : no syncope [Cough] : no cough [Abdominal Pain] : no abdominal pain [Nausea] : no nausea [Vomiting] : no vomiting [Heartburn] : no heartburn [Change in Appetite] : no change in appetite [Dysphagia] : no dysphagia [Diarrhea] : diarrhea

## 2024-03-21 NOTE — ASSESSMENT
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   The patient is clinically stable, NYHA Class II. The ECHO today was reviewed and independently interpreted by Dr. Edwards which showed critical AS with mean gradient of 73 mmHg, the results were discussed with the patient. Given her critical AS, Dr. Edwards recommends intervening on the patient's aortic valve. TAVR CTs were reviewed, the patient will need LHC prior to any aortic intervention to assess coronary arteries. The patient will also need CTS consult post TAVR. There will be a MDT discussion with Dr. Edwards and CTS post LHC for best option for patient TAVR vs SAVR given possible coronary disease and small aortic sinuses on CT.  The plan was discussed with the patient and she agrees to proceed. Will expedite LHC this week given critical AS. Labs drawn today, will be reviewed by Dr. Edwards. All questions answered.

## 2024-03-21 NOTE — PROGRESS NOTE ADULT - SUBJECTIVE AND OBJECTIVE BOX
SUBJECTIVE / INTERVAL HPI: Patient was seen and examined this morning. Complains about being wakened to early. Denies pain, SOB, N/V. A fib overnight and started on amio. Good appetite.  Glucose above target, will give NPH 6 units stat and increase premeal insulin to 5 units.    CAPILLARY BLOOD GLUCOSE & INSULIN RECEIVED  251 mg/dL (03-20 @ 17:47) - Lispro 3+1. Ate most of chicken pot pie and fruit cup.  214 mg/dL (03-20 @ 21:51)  240 mg/dL (03-20 @ 23:11) - Lantus 18 + lispro 4  235 mg/dL (03-21 @ 03:38)  281 mg/dL (03-21 @ 07:17) - Lispro 3+6. Ate most of hot cereal and fruit cup.  350 mg/dL (03-21 @ 09:26)      REVIEW OF SYSTEMS  Constitutional:  Negative fever, chills or loss of appetite.  Eyes:  Negative blurry vision or double vision.  Cardiovascular:  Negative for chest pain or palpitations.  Respiratory:  Negative for cough, wheezing, or shortness of breath.    Gastrointestinal:  Negative for nausea, vomiting, diarrhea, constipation, or abdominal pain.  Genitourinary:  Negative frequency, urgency or dysuria.  Neurologic:  No headache, confusion, dizziness, lightheadedness.    PHYSICAL EXAM  Vital Signs Last 24 Hrs  T(C): 36.7 (21 Mar 2024 08:28), Max: 37.2 (20 Mar 2024 22:21)  T(F): 98.1 (21 Mar 2024 08:28), Max: 98.9 (20 Mar 2024 22:21)  HR: 87 (21 Mar 2024 10:00) (72 - 154)  BP: --  BP(mean): --  RR: 18 (21 Mar 2024 10:00) (15 - 18)  SpO2: 96% (21 Mar 2024 10:00) (91% - 100%)    Parameters below as of 21 Mar 2024 10:00  Patient On (Oxygen Delivery Method): room air    Constitutional: Awake, alert, in no acute distress.   HEENT: Normocephalic, atraumatic, GEORGETTE, no proptosis or lid retraction.   Neck: supple, no acanthosis, no thyromegaly or palpable thyroid nodules.  Respiratory: Lungs clear to ausculation bilaterally. + CT  Cardiovascular: regular rhythm, normal S1 and S2, + murmur  GI: soft, non-tender, non-distended, bowel sounds present, no masses appreciated.  Extremities: No lower extremity edema, peripheral pulses present.   Skin: no rashes.   Psychiatric: AAO x 3. Normal affect/mood.     LABS  CBC - WBC/HGB/HTC/PLT: 15.70/9.5/29.5/284 (03-21-24)  BMP - Na/K/Cl/Bicarb/BUN/Cr/Gluc/AG/eGFR: 139/4.0/106/24/9/0.67/350/9/93 (03-21-24)  Ca - 8.8 (03-21-24)  Phos - 2.9 (03-21-24)  Mg - 2.0 (03-21-24)  LFT - Alb/Tprot/Tbili/Dbili/AlkPhos/ALT/AST: 3.6/--/0.4/--/51/20/31 (03-21-24)  PT/aPTT/INR: 13.5/32.1/1.19 (03-21-24)   Thyroid Stimulating Hormone, Serum: 1.530 (03-12-24)      MEDICATIONS  MEDICATIONS  (STANDING):  acetaminophen     Tablet .. 975 milliGRAM(s) Oral every 6 hours  aMIOdarone    Tablet   Oral   aMIOdarone    Tablet 400 milliGRAM(s) Oral every 8 hours  aMIOdarone IVPB 150 milliGRAM(s) IV Intermittent once  ascorbic acid 500 milliGRAM(s) Oral two times a day  aspirin enteric coated 81 milliGRAM(s) Oral daily  chlorhexidine 0.12% Liquid 15 milliLiter(s) Oral Mucosa every 12 hours  chlorhexidine 2% Cloths 1 Application(s) Topical daily  dextrose 5%. 1000 milliLiter(s) (50 mL/Hr) IV Continuous <Continuous>  dextrose 5%. 1000 milliLiter(s) (100 mL/Hr) IV Continuous <Continuous>  dextrose 50% Injectable 25 Gram(s) IV Push once  dextrose 50% Injectable 50 milliLiter(s) IV Push every 15 minutes  dextrose 50% Injectable 25 milliLiter(s) IV Push every 15 minutes  DOBUTamine Infusion 2 MICROgram(s)/kG/Min (3.35 mL/Hr) IV Continuous <Continuous>  furosemide   Injectable 20 milliGRAM(s) IV Push every 8 hours  gabapentin 100 milliGRAM(s) Oral three times a day  glucagon  Injectable 1 milliGRAM(s) IntraMuscular once  heparin   Injectable 5000 Unit(s) SubCutaneous every 8 hours  insulin glargine Injectable (LANTUS) 20 Unit(s) SubCutaneous at bedtime  insulin lispro (ADMELOG) corrective regimen sliding scale   SubCutaneous Before meals and at bedtime  insulin lispro Injectable (ADMELOG) 5 Unit(s) SubCutaneous three times a day before meals  insulin NPH human recombinant 6 Unit(s) SubCutaneous once  milrinone Infusion 0.25 MICROgram(s)/kG/Min (4.19 mL/Hr) IV Continuous <Continuous>  mupirocin 2% Ointment 1 Application(s) Both Nostrils two times a day  pantoprazole    Tablet 40 milliGRAM(s) Oral before breakfast  polyethylene glycol 3350 17 Gram(s) Oral daily  senna 2 Tablet(s) Oral at bedtime  sodium chloride 0.9%. 1000 milliLiter(s) (10 mL/Hr) IV Continuous <Continuous>    MEDICATIONS  (PRN):  dextrose Oral Gel 15 Gram(s) Oral once PRN Blood Glucose LESS THAN 70 milliGRAM(s)/deciliter  oxyCODONE    IR 5 milliGRAM(s) Oral every 6 hours PRN Moderate Pain (4 - 6)  oxyCODONE    IR 10 milliGRAM(s) Oral every 6 hours PRN Severe Pain (7 - 10)   SUBJECTIVE / INTERVAL HPI: Patient was seen and examined this morning. Complains about being wakened to early. Denies pain, SOB, N/V. A fib overnight and started on amio. Good appetite.  Glucose above target, will give NPH 6 units stat and increase premeal insulin to 5 units.    CAPILLARY BLOOD GLUCOSE & INSULIN RECEIVED  251 mg/dL (03-20 @ 17:47) - Lispro 3+1. Ate most of chicken pot pie and fruit cup.  214 mg/dL (03-20 @ 21:51)  240 mg/dL (03-20 @ 23:11) - Lantus 18 + lispro 4  235 mg/dL (03-21 @ 03:38)  281 mg/dL (03-21 @ 07:17) - Lispro 3+6. Ate most of hot cereal and fruit cup.  350 mg/dL (03-21 @ 09:26)  316 mg/dL (03-21 @ lunch)      REVIEW OF SYSTEMS  Constitutional:  Negative fever, chills or loss of appetite.  Eyes:  Negative blurry vision or double vision.  Cardiovascular:  Negative for chest pain or palpitations.  Respiratory:  Negative for cough, wheezing, or shortness of breath.    Gastrointestinal:  Negative for nausea, vomiting, diarrhea, constipation, or abdominal pain.  Genitourinary:  Negative frequency, urgency or dysuria.  Neurologic:  No headache, confusion, dizziness, lightheadedness.    PHYSICAL EXAM  Vital Signs Last 24 Hrs  T(C): 36.7 (21 Mar 2024 08:28), Max: 37.2 (20 Mar 2024 22:21)  T(F): 98.1 (21 Mar 2024 08:28), Max: 98.9 (20 Mar 2024 22:21)  HR: 87 (21 Mar 2024 10:00) (72 - 154)  BP: --  BP(mean): --  RR: 18 (21 Mar 2024 10:00) (15 - 18)  SpO2: 96% (21 Mar 2024 10:00) (91% - 100%)    Parameters below as of 21 Mar 2024 10:00  Patient On (Oxygen Delivery Method): room air    Constitutional: Awake, alert, in no acute distress.   HEENT: Normocephalic, atraumatic, GEORGETTE, no proptosis or lid retraction.   Neck: supple, no acanthosis, no thyromegaly or palpable thyroid nodules.  Respiratory: Lungs clear to ausculation bilaterally. + CT  Cardiovascular: regular rhythm, normal S1 and S2, + murmur  GI: soft, non-tender, non-distended, bowel sounds present, no masses appreciated.  Extremities: No lower extremity edema, peripheral pulses present.   Skin: no rashes.   Psychiatric: AAO x 3. Normal affect/mood.     LABS  CBC - WBC/HGB/HTC/PLT: 15.70/9.5/29.5/284 (03-21-24)  BMP - Na/K/Cl/Bicarb/BUN/Cr/Gluc/AG/eGFR: 139/4.0/106/24/9/0.67/350/9/93 (03-21-24)  Ca - 8.8 (03-21-24)  Phos - 2.9 (03-21-24)  Mg - 2.0 (03-21-24)  LFT - Alb/Tprot/Tbili/Dbili/AlkPhos/ALT/AST: 3.6/--/0.4/--/51/20/31 (03-21-24)  PT/aPTT/INR: 13.5/32.1/1.19 (03-21-24)   Thyroid Stimulating Hormone, Serum: 1.530 (03-12-24)      MEDICATIONS  MEDICATIONS  (STANDING):  acetaminophen     Tablet .. 975 milliGRAM(s) Oral every 6 hours  aMIOdarone    Tablet   Oral   aMIOdarone    Tablet 400 milliGRAM(s) Oral every 8 hours  aMIOdarone IVPB 150 milliGRAM(s) IV Intermittent once  ascorbic acid 500 milliGRAM(s) Oral two times a day  aspirin enteric coated 81 milliGRAM(s) Oral daily  chlorhexidine 0.12% Liquid 15 milliLiter(s) Oral Mucosa every 12 hours  chlorhexidine 2% Cloths 1 Application(s) Topical daily  dextrose 5%. 1000 milliLiter(s) (50 mL/Hr) IV Continuous <Continuous>  dextrose 5%. 1000 milliLiter(s) (100 mL/Hr) IV Continuous <Continuous>  dextrose 50% Injectable 25 Gram(s) IV Push once  dextrose 50% Injectable 50 milliLiter(s) IV Push every 15 minutes  dextrose 50% Injectable 25 milliLiter(s) IV Push every 15 minutes  DOBUTamine Infusion 2 MICROgram(s)/kG/Min (3.35 mL/Hr) IV Continuous <Continuous>  furosemide   Injectable 20 milliGRAM(s) IV Push every 8 hours  gabapentin 100 milliGRAM(s) Oral three times a day  glucagon  Injectable 1 milliGRAM(s) IntraMuscular once  heparin   Injectable 5000 Unit(s) SubCutaneous every 8 hours  insulin glargine Injectable (LANTUS) 20 Unit(s) SubCutaneous at bedtime  insulin lispro (ADMELOG) corrective regimen sliding scale   SubCutaneous Before meals and at bedtime  insulin lispro Injectable (ADMELOG) 5 Unit(s) SubCutaneous three times a day before meals  insulin NPH human recombinant 6 Unit(s) SubCutaneous once  milrinone Infusion 0.25 MICROgram(s)/kG/Min (4.19 mL/Hr) IV Continuous <Continuous>  mupirocin 2% Ointment 1 Application(s) Both Nostrils two times a day  pantoprazole    Tablet 40 milliGRAM(s) Oral before breakfast  polyethylene glycol 3350 17 Gram(s) Oral daily  senna 2 Tablet(s) Oral at bedtime  sodium chloride 0.9%. 1000 milliLiter(s) (10 mL/Hr) IV Continuous <Continuous>    MEDICATIONS  (PRN):  dextrose Oral Gel 15 Gram(s) Oral once PRN Blood Glucose LESS THAN 70 milliGRAM(s)/deciliter  oxyCODONE    IR 5 milliGRAM(s) Oral every 6 hours PRN Moderate Pain (4 - 6)  oxyCODONE    IR 10 milliGRAM(s) Oral every 6 hours PRN Severe Pain (7 - 10)

## 2024-03-21 NOTE — PROGRESS NOTE ADULT - SUBJECTIVE AND OBJECTIVE BOX
CTICU  CRITICAL  CARE  attending     Hand off received 					   Pertinent clinical, laboratory, radiographic, hemodynamic, echocardiographic, respiratory data, microbiologic data and chart were reviewed and analyzed frequently throughout the course of the day and night  Patient seen and examined with CTS/ [facility] attending at bedside  Pt is a 73y , Female, HEALTH ISSUES - PROBLEM Dx:  Aortic stenosis, severe    HTN (hypertension)    Hyperlipidemia    Type 2 diabetes mellitus    Right ventricular systolic dysfunction without heart failure    Bilateral pleural effusion        , FAMILY HISTORY:  No pertinent family history in first degree relatives    PAST MEDICAL & SURGICAL HISTORY:  Aortic stenosis      HTN (hypertension)      DM (diabetes mellitus)      Hyperlipidemia      Anemia      No significant past surgical history        Patient is a 73y old  Female who presents with a chief complaint of severe AS (23 Mar 2024 12:18)      14 system review was unremarkable    Vital signs, hemodynamic and respiratory parameters were reviewed from the bedside nursing flowsheet.  ICU Vital Signs Last 24 Hrs  T(C): 36.4 (23 Mar 2024 17:21), Max: 36.6 (23 Mar 2024 05:31)  T(F): 97.5 (23 Mar 2024 17:21), Max: 97.9 (23 Mar 2024 05:31)  HR: 86 (23 Mar 2024 21:00) (49 - 87)  BP: 144/62 (23 Mar 2024 21:00) (83/47 - 155/65)  BP(mean): 89 (23 Mar 2024 21:00) (57 - 93)  ABP: --  ABP(mean): --  RR: 16 (23 Mar 2024 21:00) (16 - 20)  SpO2: 95% (23 Mar 2024 21:00) (92% - 100%)    O2 Parameters below as of 23 Mar 2024 22:00  Patient On (Oxygen Delivery Method): nasal cannula w/ humidification  O2 Flow (L/min): 2        Adult Advanced Hemodynamics Last 24 Hrs  CVP(mm Hg): 306 (23 Mar 2024 21:00) (3 - 306)  CVP(cm H2O): --  CO: --  CI: --  PA: --  PA(mean): --  PCWP: --  SVR: --  SVRI: --  PVR: --  PVRI: --, ABG - ( 22 Mar 2024 11:12 )  pH, Arterial: 7.44  pH, Blood: x     /  pCO2: 33    /  pO2: 133   / HCO3: 22    / Base Excess: -1.1  /  SaO2: 99.2                Intake and output was reviewed and the fluid balance was calculated  Daily     Daily   I&O's Summary    22 Mar 2024 07:01  -  23 Mar 2024 07:00  --------------------------------------------------------  IN: 1080.7 mL / OUT: 80 mL / NET: 1000.7 mL    23 Mar 2024 07:01  -  23 Mar 2024 21:46  --------------------------------------------------------  IN: 1135.6 mL / OUT: 1350 mL / NET: -214.4 mL        All lines and drain sites were assessed  [facility]      POCT Blood Glucose.: 216 mg/dL (23 Mar 2024 16:47)    No acute change in mental status  Auscultation of the chest reveals equal bs  Abdomen is soft  Extremities are warm and well perfused  Wounds appear clean and unremarkable  Antibiotics are periop    labs  CBC Full  -  ( 23 Mar 2024 16:42 )  WBC Count : 11.13 K/uL  RBC Count : 3.00 M/uL  Hemoglobin : 8.1 g/dL  Hematocrit : 26.2 %  Platelet Count - Automated : 329 K/uL  Mean Cell Volume : 87.3 fl  Mean Cell Hemoglobin : 27.0 pg  Mean Cell Hemoglobin Concentration : 30.9 gm/dL  Auto Neutrophil # : 9.42 K/uL  Auto Lymphocyte # : 0.71 K/uL  Auto Monocyte # : 0.88 K/uL  Auto Eosinophil # : 0.01 K/uL  Auto Basophil # : 0.01 K/uL  Auto Neutrophil % : 84.6 %  Auto Lymphocyte % : 6.4 %  Auto Monocyte % : 7.9 %  Auto Eosinophil % : 0.1 %  Auto Basophil % : 0.1 %    03-23    144  |  108  |  27<H>  ----------------------------<  215<H>  4.3   |  25  |  1.15    Ca    9.8      23 Mar 2024 16:42  Phos  3.9     03-23  Mg     2.2     03-23    TPro  6.1  /  Alb  4.0  /  TBili  0.5  /  DBili  x   /  AST  201<H>  /  ALT  184<H>  /  AlkPhos  75  03-23    PT/INR - ( 23 Mar 2024 16:42 )   PT: 12.9 sec;   INR: 1.14          PTT - ( 23 Mar 2024 16:42 )  PTT:30.7 sec  The current medications were reviewed   MEDICATIONS  (STANDING):  ascorbic acid 500 milliGRAM(s) Oral two times a day  aspirin enteric coated 81 milliGRAM(s) Oral daily  azithromycin   Tablet 250 milliGRAM(s) Oral daily  cefTRIAXone   IVPB      cefTRIAXone   IVPB 1000 milliGRAM(s) IV Intermittent every 24 hours  chlorhexidine 0.12% Liquid 15 milliLiter(s) Oral Mucosa every 12 hours  chlorhexidine 2% Cloths 1 Application(s) Topical daily  dextrose 5%. 1000 milliLiter(s) (50 mL/Hr) IV Continuous <Continuous>  dextrose 5%. 1000 milliLiter(s) (100 mL/Hr) IV Continuous <Continuous>  dextrose 50% Injectable 25 Gram(s) IV Push once  dextrose 50% Injectable 50 milliLiter(s) IV Push every 15 minutes  dextrose 50% Injectable 25 milliLiter(s) IV Push every 15 minutes  DOBUTamine Infusion 5 MICROgram(s)/kG/Min (8.39 mL/Hr) IV Continuous <Continuous>  gabapentin 100 milliGRAM(s) Oral three times a day  glucagon  Injectable 1 milliGRAM(s) IntraMuscular once  glycopyrrolate 9 MICROgram(s)/formoterol 4.8 MICROgram(s) Inhaler 2 Puff(s) Inhalation two times a day  heparin   Injectable 5000 Unit(s) SubCutaneous every 8 hours  insulin glargine Injectable (LANTUS) 40 Unit(s) SubCutaneous at bedtime  insulin lispro (ADMELOG) corrective regimen sliding scale   SubCutaneous Before meals and at bedtime  insulin lispro Injectable (ADMELOG) 15 Unit(s) SubCutaneous three times a day before meals  mupirocin 2% Ointment 1 Application(s) Both Nostrils two times a day  pantoprazole    Tablet 40 milliGRAM(s) Oral before breakfast  polyethylene glycol 3350 17 Gram(s) Oral daily  senna 2 Tablet(s) Oral at bedtime  sodium chloride 0.9%. 1000 milliLiter(s) (10 mL/Hr) IV Continuous <Continuous>    MEDICATIONS  (PRN):  dextrose Oral Gel 15 Gram(s) Oral once PRN Blood Glucose LESS THAN 70 milliGRAM(s)/deciliter  oxyCODONE    IR 10 milliGRAM(s) Oral every 6 hours PRN Severe Pain (7 - 10)  oxyCODONE    IR 5 milliGRAM(s) Oral every 6 hours PRN Moderate Pain (4 - 6)       PROBLEM LIST/ ASSESSMENT:  HEALTH ISSUES - PROBLEM Dx:  Aortic stenosis, severe    HTN (hypertension)    Hyperlipidemia    Type 2 diabetes mellitus    Right ventricular systolic dysfunction without heart failure    Bilateral pleural effusion        ,   Patient is a 73y old  Female who presents with a chief complaint of severe AS (23 Mar 2024 12:18)     s/p cardiac surgery    Acute systolic and diastolic heart failure evidenced by SOB and parenchymal infiltrates; will treat with diuresis    Cardiogenic shock on ionotropy      Acute post operative pulmonary insufficiency ruled in due to hypoxemia, O2 sats < 91% on RA treated with HFNC              My plan includes :    abx for aecb    sputum cx    ics plus lama   close hemodynamic, ventilatory and drain monitoring and management per post op routine    Monitor for arrhythmias and monitor parameters for organ perfusion  beta blockade not administered due to hemodynamic instability and bradycardia  monitor neurologic status  Head of the bed should remain elevated to 45 deg .   chest PT and IS will be encouraged  monitor adequacy of oxygenation and ventilation and attempt to wean oxygen  antibiotic regimen will be tailored to the clinical, laboratory and microbiologic data  Nutritional goals will be met using po eventually , ensure adequate caloric intake and montior the same  Stress ulcer and VTE prophylaxis will be achieved    Glycemic control is satisfactory  Electrolytes have been repleted as necessary and wound care has been carried out. Pain control has been achieved.   agressive physical therapy and early mobility and ambulation goals will be met   The family was updated about the course and plan  CRITICAL CARE TIME Upon my evaluation, this patient had a high probability of imminent or life-threatening deterioration due to the above problems which required my direct attention, intervention, and personal management.  I have personally provided 150 minutes of critical care time exclusive of time spent on separately billable procedures. Time included review of laboratory data, radiology results, discussion with consultants, and monitoring for potential decompensation. Interventions were performed as documented abovepersonally provided by me  in evaluation and management, reassessments, review and interpretation of labs and x-rays, ventilator and hemodynamic management, formulating a plan and coordinating care: ___150___ MIN.  Time does not include procedural time.    CTICU ATTENDING     					    Axel Sinha MD

## 2024-03-21 NOTE — PROGRESS NOTE ADULT - PROBLEM SELECTOR PLAN 1
Type 2 diabetes mellitus with hyperglycemia  - Please increase lantus to   units at bedtime.   - Increase lispro to  units with meals - poor appetite.   - Continue lispro moderate dose sliding scale before meals and at bedtime.  - Patient's fingerstick glucose goal is 100-180 mg/dL.    - Consistent carb diet.  - For discharge, patient can ***.    - Patient can follow up at discharge with [facility] by calling [phone] to make an appointment.      Discussed with Dr Huffman. Primary team updated. Type 2 diabetes mellitus with hyperglycemia  - Please increase lantus to 28  units at bedtime.   - Increase lispro to 10 units with meals.  - Continue lispro moderate dose sliding scale before meals and at bedtime.  - Patient's fingerstick glucose goal is 100-180 mg/dL.    - Consistent carb diet.  - For discharge, patient can ***.    - Patient can follow up at discharge with [facility] by calling [phone] to make an appointment.      Discussed with Dr Huffman. Primary team updated.

## 2024-03-21 NOTE — RESULTS/DATA
[TextEntry] : KCCQ on 03/11/2024 done 5MWT on 03/11/2024 - 8.67 secs, 9.17 secs, 10.23 secs   ECHO on 03/11/2024-  1. Normal left and right ventricular size and systolic function.2. Mild symmetric left ventricular hypertrophy. 3. Critically severe aortic stenosis. The peak trasnvavluar velocity is 5.33 m/s, the mean transvalvular gradient is 73 mmHg, and the LVOT/AV ratio is 0.16. Rick aortic valve area (estimated via the continuity method) is 0.43cm2. 4. Normal atria 5. No evidence of pulmonary hypertension.6. No pericardial effusion.7. Compared to the previous TTE performed on 7/3/2023, aortic valve area has decreased.  EKG- will be done in EKG tech

## 2024-03-21 NOTE — HISTORY OF PRESENT ILLNESS
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   Patient was initially seen in the [facility] in May 2023 to discuss treatment options for aortic stenosis. Patient had undergone necessary testing. On 07/31/23, patient was seen for follow up at which time she was minimally symptomatic with NYHA Class I symptoms and patient was satisfied with her quality of life, thus watchful waiting was pursued. Today, patient presents for follow up.    Denies any prior history of CVA or CAD.   The patient is scheduled for an ECHO today.   Since her last visit, the patient states she is feeling well with no complaints.  She denies any SOB at rest or exertion, chest pain, palpitations, dizziness, syncope, or LE edema. The patient does admit to mild fatigue. She denies any fever or chills   The patient was referred to Dr. Moon for cardiology care at [facility], however she does not remember if she saw him yet.    She is a retired park supervisor, lives alone in an apartment in [place] with stairs and elevator, independent with high level ADLs (no services), and does not use any assist device.

## 2024-03-21 NOTE — PROGRESS NOTE ADULT - ASSESSMENT
72 y/o female PMH DM, severe AS, EF 60%, HTN, HLD, anemia who was admitted to [facility] with syncope. CT negative for CVA, but echo showed severe AS s/p SAVR 3/19/2024.    Endocrine consulted for T2 diabetes management.     A1C: 9.6 %  C-peptide 1.8 with  - March 2024  BUN: 9  Creatinine: 0.67  GFR: 93  Weight: 55.9  BMI: 22.5  EF: 60%

## 2024-03-21 NOTE — CARDIOLOGY SUMMARY
[de-identified] : TTE 7/3/2023: CONCLUSIONS:  1. Normal left ventricular size and systolic function.  2. Mild symmetric left ventricular hypertrophy.  3. Normal right ventricular size and systolic function.  4. Normal atria.  5. Severe aortic stenosis. The peak transvalvular velocity is 4.29 m/s, the mean transvalvular gradient is 44.00 mmHg, and the LVOT/AV velocity ratio is 0.22. The aortic valve area (estimated via the continuity method) is 0.56 cm?.  6. No evidence of pulmonary hypertension.  7. No pericardial effusion.  8. No prior echo is available for comparison.  [de-identified] : CT Cardiac 7/14/23 IMPRESSION: Cardiac: 1.  Moderate proximal RCA stenosis 2.  Non-obstructive disease in the remaining coronary segments 3.  Trileaflet aortic valve with leaflet thickening and calcification Non-cardiac: See accompanying report for the CT angiogram of the chest, abdomen and pelvis  CTA Abdomen and Pelvis 7/14/23 Aorta: No thoracoabdominal aortic dissection or aneurysm. Extensive infrarenal atherosclerotic calcification in the wall of the infrarenal abdominal aorta. Great vessels: Aberrant right subclavian artery. Great vessels are widely patent Celiac axis, superior mesenteric artery: Widely patent. Renal arteries: Proximal moderate stenosis at origin of right renal artery. Moderate to severe stenosis at origin of left renal artery. Patent accessory left renal artery. Common iliac arteries: Widely patent bilaterally External iliac arteries, common femoral arteries: No hemodynamically significant stenosis Internal iliac arteries: Proximal moderate to severe stenoses bilaterally.  NONANGIOGRAPHIC CT FINDINGS: LUNGS AND LARGE AIRWAYS: Patent central airways. No pulmonary nodules. Mild dependent bibasilar focal atelectasis PLEURA: No pleural effusion. VESSELS: As per above. Of note, aberrant right subclavian artery HEART: Heart size is normal. No pericardial effusion. MEDIASTINUM AND NOAH: No lymphadenopathy. CHEST WALL AND LOWER NECK: Within normal limits.  ABDOMEN AND PELVIS: LIVER: Within normal limits. BILE DUCTS: Normal caliber. GALLBLADDER: Diffusely abnormal gallbladder wall thickening with cholelithiasis. SPLEEN: Within normal limits. PANCREAS:  4 cm calcification noted in pancreatic tail. No pancreatic ductal dilatation. ADRENALS: Within normal limits. KIDNEYS/URETERS: Within normal limits.  BLADDER: Within normal limits. REPRODUCTIVE ORGANS: Large leiomyoma measuring approximately 4.9 cm. It may be submucosal.  BOWEL: No bowel obstruction. Appendix is normal. PERITONEUM: No ascites. VESSELS: Within normal limits. RETROPERITONEUM/LYMPH NODES: No lymphadenopathy. ABDOMINAL WALL: Within normal limits. BONES: Degenerative changes.  IMPRESSION: 1.  Aortic measurements as noted above. 2.  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 3.  UNEXPECTED FINDING: Abnormal gallbladder wall thickening with cholelithiasis, cannot exclude acute cholecystitis. Clinical correlation.    [de-identified] : Carotid US 7/14/23: IMPRESSION: No significant hemodynamic stenosis of either carotid artery.

## 2024-03-21 NOTE — PLAN
[TextEntry] : 1) Will return to [facility] clinic for MDT discussion after [facility] 2) CTS evaluation 3) Labs drawn today, will be reviewed by Dr. Edwards 4) EKG will be done today, will be reviewed by Dr. Edwards 5) Continue current medication regimen and cardiology care with Dr. Moon.   I, Dr. Hieu Edwards, personally performed the evaluation and management (E/M) services for this established patient who presents today with (a) new problem(s)/exacerbation of (an) existing condition(s). That E/M includes conducting the clinically appropriate interval history &/or exam, assessing all new/exacerbated conditions, and establishing a new plan of care. Today, my KARLEE, noted herewith, was here to observe my evaluation and management service for this new problem/exacerbated condition and follow the plan of care established by me going forward.

## 2024-03-21 NOTE — PROGRESS NOTE ADULT - SUBJECTIVE AND OBJECTIVE BOX
INTERVAL HPI/OVERNIGHT EVENTS:    POD#2 Bio Bental  EF normal/hyperdynamic    74yo Female Hx DM, severe AS, HTN, HLD, anemia presenting to [facility] with syncope.    CT Head - negative  ECHO: severe AS. Transferred to [facility] for intervention - possible TAVR vs BAV.     not candidate for perc intervention due to anatomy    3/19 - OR - intraop: 2.8 L LR/4 U pRBC/5 cryo/2 FFP/1 platelet  3/20 - dobutamine started (elevated LA) with improvement ; primacor added and Extubated    overnight - Fib noted    titrated off; remains milrinone 0.25    patient OOB in chair eating lunch    ICU Vital Signs Last 24 Hrs  T(C): 36.7 (21 Mar 2024 08:28), Max: 37.2 (20 Mar 2024 22:21)  T(F): 98.1 (21 Mar 2024 08:28), Max: 98.9 (20 Mar 2024 22:21)  HR: 87 (21 Mar 2024 11:00) (72 - 154) sinus   ABP: 136/59 (21 Mar 2024 11:00) (90/50 - 158/76)  ABP(mean): 81 (21 Mar 2024 11:00) (62 - 83)  RR: 18 (21 Mar 2024 11:00) (15 - 18)  SpO2: 91% (21 Mar 2024 11:00) (91% - 100%) RA    Qtts: Primacor 0.25 - dec to 0.125    I&O's Summary    20 Mar 2024 07:  -  21 Mar 2024 07:00  --------------------------------------------------------  IN: 1192.4 mL / OUT: 2260 mL / NET: -1067.6 mL    21 Mar 2024 07:  -  21 Mar 2024 12:30  --------------------------------------------------------  IN: 70 mL / OUT: 145 mL / NET: -75 mL    Physical Exam    Heart - regular (-)rub/gallop  Lungs - dec BS base - no rhonchi/wheeze  Abd - (+)BS soft NTND (-)r/r/g  Ext - warm to touch; no cyanosis/clubbing  Chest - op bandage in place  Neuro - alert/oriented and interactive - nonfocal   Skin - no rash     LABS:                        9.5    15.70 )-----------( 284      ( 21 Mar 2024 09:26 )             29.5     03-21    139  |  106  |  9   ----------------------------<  350<H>  4.0   |  24  |  0.67    Ca    8.8      21 Mar 2024 09:26  Phos  2.9     03-21  Mg     2.0     03-21    TPro  5.6<L>  /  Alb  3.6  /  TBili  0.4  /  DBili  x   /  AST  31  /  ALT  20  /  AlkPhos  51  03-21    PT/INR - ( 21 Mar 2024 09:26 )   PT: 13.5 sec;   INR: 1.19     PTT - ( 21 Mar 2024 09:26 )  PTT:32.1 sec    ABG - ( 21 Mar 2024 09:50 )  pH, Arterial: 7.45  pH, Blood: x     /  pCO2: 36    /  pO2: 76    / HCO3: 25    / Base Excess: 1.3   /  SaO2: 97.7      RADIOLOGY & ADDITIONAL STUDIES: reviewed     Patient with severe symptomatic aortic stenosis s/p Bio Bental - now POD#2 - post-op cardiogenic shock improving - remains on inotropic support     1. CV  titrate inotropic support down - dec milrinone to 0.125  currently in sinus; cont po amio load   ASA  currently on lasix 20 IV q8h - ongoing diuresis     2. Pulm   incentive spirometry/ambulation with staff assist  titrate supplemental oxygen down to off   ambulation with staff assist     maintain glycemic control   pain management    d/w patient/staff and CTS     I have spent/provided stated minutes of critical care time to this patient: 60

## 2024-03-22 LAB
ALBUMIN SERPL ELPH-MCNC: 3.8 G/DL — SIGNIFICANT CHANGE UP (ref 3.3–5)
ALBUMIN SERPL ELPH-MCNC: 4 G/DL — SIGNIFICANT CHANGE UP (ref 3.3–5)
ALP SERPL-CCNC: 55 U/L — SIGNIFICANT CHANGE UP (ref 40–120)
ALP SERPL-CCNC: 56 U/L — SIGNIFICANT CHANGE UP (ref 40–120)
ALT FLD-CCNC: 26 U/L — SIGNIFICANT CHANGE UP (ref 10–45)
ALT FLD-CCNC: 31 U/L — SIGNIFICANT CHANGE UP (ref 10–45)
ANION GAP SERPL CALC-SCNC: 11 MMOL/L — SIGNIFICANT CHANGE UP (ref 5–17)
ANION GAP SERPL CALC-SCNC: 11 MMOL/L — SIGNIFICANT CHANGE UP (ref 5–17)
APTT BLD: 30.3 SEC — SIGNIFICANT CHANGE UP (ref 24.5–35.6)
APTT BLD: 31.2 SEC — SIGNIFICANT CHANGE UP (ref 24.5–35.6)
AST SERPL-CCNC: 33 U/L — SIGNIFICANT CHANGE UP (ref 10–40)
AST SERPL-CCNC: 41 U/L — HIGH (ref 10–40)
BASOPHILS # BLD AUTO: 0.02 K/UL — SIGNIFICANT CHANGE UP (ref 0–0.2)
BASOPHILS # BLD AUTO: 0.02 K/UL — SIGNIFICANT CHANGE UP (ref 0–0.2)
BASOPHILS NFR BLD AUTO: 0.2 % — SIGNIFICANT CHANGE UP (ref 0–2)
BASOPHILS NFR BLD AUTO: 0.2 % — SIGNIFICANT CHANGE UP (ref 0–2)
BILIRUB SERPL-MCNC: 0.4 MG/DL — SIGNIFICANT CHANGE UP (ref 0.2–1.2)
BILIRUB SERPL-MCNC: 0.5 MG/DL — SIGNIFICANT CHANGE UP (ref 0.2–1.2)
BUN SERPL-MCNC: 12 MG/DL — SIGNIFICANT CHANGE UP (ref 7–23)
BUN SERPL-MCNC: 16 MG/DL — SIGNIFICANT CHANGE UP (ref 7–23)
CALCIUM SERPL-MCNC: 9 MG/DL — SIGNIFICANT CHANGE UP (ref 8.4–10.5)
CALCIUM SERPL-MCNC: 9.5 MG/DL — SIGNIFICANT CHANGE UP (ref 8.4–10.5)
CHLORIDE SERPL-SCNC: 105 MMOL/L — SIGNIFICANT CHANGE UP (ref 96–108)
CHLORIDE SERPL-SCNC: 111 MMOL/L — HIGH (ref 96–108)
CO2 SERPL-SCNC: 25 MMOL/L — SIGNIFICANT CHANGE UP (ref 22–31)
CO2 SERPL-SCNC: 25 MMOL/L — SIGNIFICANT CHANGE UP (ref 22–31)
CREAT SERPL-MCNC: 0.88 MG/DL — SIGNIFICANT CHANGE UP (ref 0.5–1.3)
CREAT SERPL-MCNC: 0.95 MG/DL — SIGNIFICANT CHANGE UP (ref 0.5–1.3)
EGFR: 64 ML/MIN/1.73M2 — SIGNIFICANT CHANGE UP
EGFR: 70 ML/MIN/1.73M2 — SIGNIFICANT CHANGE UP
EOSINOPHIL # BLD AUTO: 0.04 K/UL — SIGNIFICANT CHANGE UP (ref 0–0.5)
EOSINOPHIL # BLD AUTO: 0.05 K/UL — SIGNIFICANT CHANGE UP (ref 0–0.5)
EOSINOPHIL NFR BLD AUTO: 0.4 % — SIGNIFICANT CHANGE UP (ref 0–6)
EOSINOPHIL NFR BLD AUTO: 0.5 % — SIGNIFICANT CHANGE UP (ref 0–6)
GAS PNL BLDA: SIGNIFICANT CHANGE UP
GAS PNL BLDA: SIGNIFICANT CHANGE UP
GLUCOSE BLDC GLUCOMTR-MCNC: 111 MG/DL — HIGH (ref 70–99)
GLUCOSE BLDC GLUCOMTR-MCNC: 272 MG/DL — HIGH (ref 70–99)
GLUCOSE BLDC GLUCOMTR-MCNC: 289 MG/DL — HIGH (ref 70–99)
GLUCOSE BLDC GLUCOMTR-MCNC: 291 MG/DL — HIGH (ref 70–99)
GLUCOSE BLDC GLUCOMTR-MCNC: 298 MG/DL — HIGH (ref 70–99)
GLUCOSE BLDC GLUCOMTR-MCNC: 78 MG/DL — SIGNIFICANT CHANGE UP (ref 70–99)
GLUCOSE SERPL-MCNC: 188 MG/DL — HIGH (ref 70–99)
GLUCOSE SERPL-MCNC: 325 MG/DL — HIGH (ref 70–99)
GRAM STN FLD: ABNORMAL
HCT VFR BLD CALC: 26.4 % — LOW (ref 34.5–45)
HCT VFR BLD CALC: 27 % — LOW (ref 34.5–45)
HGB BLD-MCNC: 8.5 G/DL — LOW (ref 11.5–15.5)
HGB BLD-MCNC: 8.7 G/DL — LOW (ref 11.5–15.5)
IMM GRANULOCYTES NFR BLD AUTO: 0.5 % — SIGNIFICANT CHANGE UP (ref 0–0.9)
IMM GRANULOCYTES NFR BLD AUTO: 0.7 % — SIGNIFICANT CHANGE UP (ref 0–0.9)
INR BLD: 1.05 — SIGNIFICANT CHANGE UP (ref 0.85–1.18)
INR BLD: 1.1 — SIGNIFICANT CHANGE UP (ref 0.85–1.18)
LACTATE SERPL-SCNC: 1 MMOL/L — SIGNIFICANT CHANGE UP (ref 0.5–2)
LACTATE SERPL-SCNC: 1.8 MMOL/L — SIGNIFICANT CHANGE UP (ref 0.5–2)
LYMPHOCYTES # BLD AUTO: 0.81 K/UL — LOW (ref 1–3.3)
LYMPHOCYTES # BLD AUTO: 0.84 K/UL — LOW (ref 1–3.3)
LYMPHOCYTES # BLD AUTO: 7.7 % — LOW (ref 13–44)
LYMPHOCYTES # BLD AUTO: 7.8 % — LOW (ref 13–44)
MAGNESIUM SERPL-MCNC: 2.1 MG/DL — SIGNIFICANT CHANGE UP (ref 1.6–2.6)
MAGNESIUM SERPL-MCNC: 2.1 MG/DL — SIGNIFICANT CHANGE UP (ref 1.6–2.6)
MCHC RBC-ENTMCNC: 27.2 PG — SIGNIFICANT CHANGE UP (ref 27–34)
MCHC RBC-ENTMCNC: 27.4 PG — SIGNIFICANT CHANGE UP (ref 27–34)
MCHC RBC-ENTMCNC: 32.2 GM/DL — SIGNIFICANT CHANGE UP (ref 32–36)
MCHC RBC-ENTMCNC: 32.2 GM/DL — SIGNIFICANT CHANGE UP (ref 32–36)
MCV RBC AUTO: 84.3 FL — SIGNIFICANT CHANGE UP (ref 80–100)
MCV RBC AUTO: 85.2 FL — SIGNIFICANT CHANGE UP (ref 80–100)
MONOCYTES # BLD AUTO: 1 K/UL — HIGH (ref 0–0.9)
MONOCYTES # BLD AUTO: 1.29 K/UL — HIGH (ref 0–0.9)
MONOCYTES NFR BLD AUTO: 12 % — SIGNIFICANT CHANGE UP (ref 2–14)
MONOCYTES NFR BLD AUTO: 9.5 % — SIGNIFICANT CHANGE UP (ref 2–14)
NEUTROPHILS # BLD AUTO: 8.47 K/UL — HIGH (ref 1.8–7.4)
NEUTROPHILS # BLD AUTO: 8.55 K/UL — HIGH (ref 1.8–7.4)
NEUTROPHILS NFR BLD AUTO: 78.9 % — HIGH (ref 43–77)
NEUTROPHILS NFR BLD AUTO: 81.6 % — HIGH (ref 43–77)
NRBC # BLD: 0 /100 WBCS — SIGNIFICANT CHANGE UP (ref 0–0)
NRBC # BLD: 0 /100 WBCS — SIGNIFICANT CHANGE UP (ref 0–0)
PHOSPHATE SERPL-MCNC: 2.2 MG/DL — LOW (ref 2.5–4.5)
PHOSPHATE SERPL-MCNC: 2.6 MG/DL — SIGNIFICANT CHANGE UP (ref 2.5–4.5)
PLATELET # BLD AUTO: 253 K/UL — SIGNIFICANT CHANGE UP (ref 150–400)
PLATELET # BLD AUTO: 261 K/UL — SIGNIFICANT CHANGE UP (ref 150–400)
POTASSIUM SERPL-MCNC: 4.1 MMOL/L — SIGNIFICANT CHANGE UP (ref 3.5–5.3)
POTASSIUM SERPL-MCNC: 4.2 MMOL/L — SIGNIFICANT CHANGE UP (ref 3.5–5.3)
POTASSIUM SERPL-SCNC: 4.1 MMOL/L — SIGNIFICANT CHANGE UP (ref 3.5–5.3)
POTASSIUM SERPL-SCNC: 4.2 MMOL/L — SIGNIFICANT CHANGE UP (ref 3.5–5.3)
PROT SERPL-MCNC: 5.7 G/DL — LOW (ref 6–8.3)
PROT SERPL-MCNC: 6 G/DL — SIGNIFICANT CHANGE UP (ref 6–8.3)
PROTHROM AB SERPL-ACNC: 11.9 SEC — SIGNIFICANT CHANGE UP (ref 9.5–13)
PROTHROM AB SERPL-ACNC: 12.5 SEC — SIGNIFICANT CHANGE UP (ref 9.5–13)
RBC # BLD: 3.13 M/UL — LOW (ref 3.8–5.2)
RBC # BLD: 3.17 M/UL — LOW (ref 3.8–5.2)
RBC # FLD: 15.1 % — HIGH (ref 10.3–14.5)
RBC # FLD: 15.6 % — HIGH (ref 10.3–14.5)
SODIUM SERPL-SCNC: 141 MMOL/L — SIGNIFICANT CHANGE UP (ref 135–145)
SODIUM SERPL-SCNC: 147 MMOL/L — HIGH (ref 135–145)
SPECIMEN SOURCE: SIGNIFICANT CHANGE UP
WBC # BLD: 10.48 K/UL — SIGNIFICANT CHANGE UP (ref 3.8–10.5)
WBC # BLD: 10.73 K/UL — HIGH (ref 3.8–10.5)
WBC # FLD AUTO: 10.48 K/UL — SIGNIFICANT CHANGE UP (ref 3.8–10.5)
WBC # FLD AUTO: 10.73 K/UL — HIGH (ref 3.8–10.5)

## 2024-03-22 PROCEDURE — 99292 CRITICAL CARE ADDL 30 MIN: CPT

## 2024-03-22 PROCEDURE — 99232 SBSQ HOSP IP/OBS MODERATE 35: CPT

## 2024-03-22 PROCEDURE — 99291 CRITICAL CARE FIRST HOUR: CPT

## 2024-03-22 PROCEDURE — 71045 X-RAY EXAM CHEST 1 VIEW: CPT | Mod: 26

## 2024-03-22 RX ORDER — INSULIN GLARGINE 100 [IU]/ML
40 INJECTION, SOLUTION SUBCUTANEOUS AT BEDTIME
Refills: 0 | Status: DISCONTINUED | OUTPATIENT
Start: 2024-03-22 | End: 2024-03-25

## 2024-03-22 RX ORDER — FUROSEMIDE 40 MG
20 TABLET ORAL ONCE
Refills: 0 | Status: COMPLETED | OUTPATIENT
Start: 2024-03-22 | End: 2024-03-22

## 2024-03-22 RX ORDER — AMIODARONE HYDROCHLORIDE 400 MG/1
150 TABLET ORAL ONCE
Refills: 0 | Status: COMPLETED | OUTPATIENT
Start: 2024-03-22 | End: 2024-03-22

## 2024-03-22 RX ORDER — HUMAN INSULIN 100 [IU]/ML
10 INJECTION, SUSPENSION SUBCUTANEOUS ONCE
Refills: 0 | Status: COMPLETED | OUTPATIENT
Start: 2024-03-22 | End: 2024-03-22

## 2024-03-22 RX ORDER — PHENYLEPHRINE HYDROCHLORIDE 10 MG/ML
0.1 INJECTION INTRAVENOUS
Qty: 40 | Refills: 0 | Status: DISCONTINUED | OUTPATIENT
Start: 2024-03-22 | End: 2024-03-22

## 2024-03-22 RX ORDER — ALBUMIN HUMAN 25 %
250 VIAL (ML) INTRAVENOUS ONCE
Refills: 0 | Status: COMPLETED | OUTPATIENT
Start: 2024-03-22 | End: 2024-03-22

## 2024-03-22 RX ORDER — INSULIN LISPRO 100/ML
15 VIAL (ML) SUBCUTANEOUS
Refills: 0 | Status: DISCONTINUED | OUTPATIENT
Start: 2024-03-22 | End: 2024-03-25

## 2024-03-22 RX ORDER — POTASSIUM CHLORIDE 20 MEQ
20 PACKET (EA) ORAL ONCE
Refills: 0 | Status: COMPLETED | OUTPATIENT
Start: 2024-03-22 | End: 2024-03-22

## 2024-03-22 RX ORDER — METOPROLOL TARTRATE 50 MG
12.5 TABLET ORAL EVERY 12 HOURS
Refills: 0 | Status: DISCONTINUED | OUTPATIENT
Start: 2024-03-22 | End: 2024-03-23

## 2024-03-22 RX ADMIN — Medication 975 MILLIGRAM(S): at 18:41

## 2024-03-22 RX ADMIN — AMIODARONE HYDROCHLORIDE 400 MILLIGRAM(S): 400 TABLET ORAL at 14:56

## 2024-03-22 RX ADMIN — Medication 15 UNIT(S): at 12:09

## 2024-03-22 RX ADMIN — CEFTRIAXONE 100 MILLIGRAM(S): 500 INJECTION, POWDER, FOR SOLUTION INTRAMUSCULAR; INTRAVENOUS at 19:22

## 2024-03-22 RX ADMIN — AMIODARONE HYDROCHLORIDE 133.33 MILLIGRAM(S): 400 TABLET ORAL at 08:27

## 2024-03-22 RX ADMIN — Medication 20 MILLIGRAM(S): at 18:40

## 2024-03-22 RX ADMIN — GABAPENTIN 100 MILLIGRAM(S): 400 CAPSULE ORAL at 14:57

## 2024-03-22 RX ADMIN — AMIODARONE HYDROCHLORIDE 600 MILLIGRAM(S): 400 TABLET ORAL at 04:42

## 2024-03-22 RX ADMIN — HEPARIN SODIUM 5000 UNIT(S): 5000 INJECTION INTRAVENOUS; SUBCUTANEOUS at 14:57

## 2024-03-22 RX ADMIN — CHLORHEXIDINE GLUCONATE 1 APPLICATION(S): 213 SOLUTION TOPICAL at 12:09

## 2024-03-22 RX ADMIN — POLYETHYLENE GLYCOL 3350 17 GRAM(S): 17 POWDER, FOR SOLUTION ORAL at 11:14

## 2024-03-22 RX ADMIN — Medication 12.5 MILLIGRAM(S): at 18:42

## 2024-03-22 RX ADMIN — Medication 10 UNIT(S): at 07:25

## 2024-03-22 RX ADMIN — Medication 6: at 22:58

## 2024-03-22 RX ADMIN — MUPIROCIN 1 APPLICATION(S): 20 OINTMENT TOPICAL at 19:22

## 2024-03-22 RX ADMIN — AZITHROMYCIN 255 MILLIGRAM(S): 500 TABLET, FILM COATED ORAL at 22:44

## 2024-03-22 RX ADMIN — AMIODARONE HYDROCHLORIDE 400 MILLIGRAM(S): 400 TABLET ORAL at 22:44

## 2024-03-22 RX ADMIN — MUPIROCIN 1 APPLICATION(S): 20 OINTMENT TOPICAL at 06:00

## 2024-03-22 RX ADMIN — Medication 20 MILLIGRAM(S): at 04:12

## 2024-03-22 RX ADMIN — GLYCOPYRROLATE AND FORMOTEROL FUMARATE 2 PUFF(S): 9; 4.8 AEROSOL, METERED RESPIRATORY (INHALATION) at 21:44

## 2024-03-22 RX ADMIN — Medication 975 MILLIGRAM(S): at 11:14

## 2024-03-22 RX ADMIN — Medication 975 MILLIGRAM(S): at 00:16

## 2024-03-22 RX ADMIN — Medication 20 MILLIEQUIVALENT(S): at 18:44

## 2024-03-22 RX ADMIN — Medication 6: at 07:25

## 2024-03-22 RX ADMIN — HEPARIN SODIUM 5000 UNIT(S): 5000 INJECTION INTRAVENOUS; SUBCUTANEOUS at 05:40

## 2024-03-22 RX ADMIN — HEPARIN SODIUM 5000 UNIT(S): 5000 INJECTION INTRAVENOUS; SUBCUTANEOUS at 22:44

## 2024-03-22 RX ADMIN — PANTOPRAZOLE SODIUM 40 MILLIGRAM(S): 20 TABLET, DELAYED RELEASE ORAL at 05:40

## 2024-03-22 RX ADMIN — PHENYLEPHRINE HYDROCHLORIDE 2.1 MICROGRAM(S)/KG/MIN: 10 INJECTION INTRAVENOUS at 10:07

## 2024-03-22 RX ADMIN — GLYCOPYRROLATE AND FORMOTEROL FUMARATE 2 PUFF(S): 9; 4.8 AEROSOL, METERED RESPIRATORY (INHALATION) at 10:06

## 2024-03-22 RX ADMIN — AMIODARONE HYDROCHLORIDE 600 MILLIGRAM(S): 400 TABLET ORAL at 04:12

## 2024-03-22 RX ADMIN — SENNA PLUS 2 TABLET(S): 8.6 TABLET ORAL at 22:44

## 2024-03-22 RX ADMIN — Medication 6: at 11:14

## 2024-03-22 RX ADMIN — Medication 12.5 MILLIGRAM(S): at 11:14

## 2024-03-22 RX ADMIN — Medication 975 MILLIGRAM(S): at 05:39

## 2024-03-22 RX ADMIN — HUMAN INSULIN 10 UNIT(S): 100 INJECTION, SUSPENSION SUBCUTANEOUS at 11:18

## 2024-03-22 RX ADMIN — AMIODARONE HYDROCHLORIDE 400 MILLIGRAM(S): 400 TABLET ORAL at 05:39

## 2024-03-22 RX ADMIN — Medication 81 MILLIGRAM(S): at 11:13

## 2024-03-22 RX ADMIN — Medication 975 MILLIGRAM(S): at 12:05

## 2024-03-22 RX ADMIN — Medication 975 MILLIGRAM(S): at 19:21

## 2024-03-22 RX ADMIN — Medication 975 MILLIGRAM(S): at 01:00

## 2024-03-22 RX ADMIN — Medication 500 MILLIGRAM(S): at 05:40

## 2024-03-22 RX ADMIN — Medication 500 MILLIGRAM(S): at 18:42

## 2024-03-22 RX ADMIN — GABAPENTIN 100 MILLIGRAM(S): 400 CAPSULE ORAL at 22:45

## 2024-03-22 RX ADMIN — GABAPENTIN 100 MILLIGRAM(S): 400 CAPSULE ORAL at 05:40

## 2024-03-22 RX ADMIN — Medication 975 MILLIGRAM(S): at 06:30

## 2024-03-22 RX ADMIN — Medication 125 MILLILITER(S): at 10:05

## 2024-03-22 RX ADMIN — INSULIN GLARGINE 40 UNIT(S): 100 INJECTION, SOLUTION SUBCUTANEOUS at 22:58

## 2024-03-22 NOTE — PROGRESS NOTE ADULT - SUBJECTIVE AND OBJECTIVE BOX
SUBJECTIVE / INTERVAL HPI: Patient was seen and examined this morning.  White count improved. Afebrile and denies fever/chills. Reports cough yesterday, but none today. Small b/l effusions and sputum with gram + rods. Started on ceftriaxone and azithromycin for PNA yesterday. Eating well, but skipped lunch because she had big breakfast. Glucose still elevated, likely form stress and possible of infection.    CAPILLARY BLOOD GLUCOSE & INSULIN RECEIVED  235 mg/dL (03-21 @ 03:38)  281 mg/dL (03-21 @ 07:17)  350 mg/dL (03-21 @ 09:26)  316 mg/dL (03-21 @ 10:50)  294 mg/dL (03-21 @ 16:04)  284 mg/dL (03-21 @ 16:09)  264 mg/dL (03-21 @ 21:54)  188 mg/dL (03-22 @ 02:49)  272 mg/dL (03-22 @ 07:24)  289 mg/dL (03-22 @ 11:00)  325 mg/dL (03-22 @ 11:06)  298 mg/dL (03-22 @ 11:55)      REVIEW OF SYSTEMS  Constitutional:  Negative fever, chills or loss of appetite.  Eyes:  Negative blurry vision or double vision.  Cardiovascular:  Negative for chest pain or palpitations.  Respiratory:  Negative for cough, wheezing, or shortness of breath.    Gastrointestinal:  Negative for nausea, vomiting, diarrhea, constipation, or abdominal pain.  Genitourinary:  Negative frequency, urgency or dysuria.  Neurologic:  No headache, confusion, dizziness, lightheadedness.    PHYSICAL EXAM  Vital Signs Last 24 Hrs  T(C): 36.3 (22 Mar 2024 08:42), Max: 37 (22 Mar 2024 05:30)  T(F): 97.4 (22 Mar 2024 08:42), Max: 98.6 (22 Mar 2024 05:30)  HR: 52 (22 Mar 2024 13:00) (52 - 142)  BP: --  BP(mean): --  RR: 15 (22 Mar 2024 13:00) (15 - 20)  SpO2: 100% (22 Mar 2024 13:00) (91% - 100%)    Parameters below as of 22 Mar 2024 13:00  Patient On (Oxygen Delivery Method): nasal cannula w/ humidification  O2 Flow (L/min): 3    Constitutional: Awake, alert, in no acute distress.   HEENT: Normocephalic, atraumatic, GEORGETTE, no proptosis or lid retraction.   Neck: supple, no acanthosis, no thyromegaly or palpable thyroid nodules.  Respiratory: Lungs clear to ausculation bilaterally. + CT  Cardiovascular: regular rhythm, normal S1 and S2, + murmur  GI: soft, non-tender, non-distended, bowel sounds present, no masses appreciated.  Extremities: No lower extremity edema, peripheral pulses present.   Skin: no rashes.   Psychiatric: AAO x 3. Normal affect/mood.     LABS  CBC - WBC/HGB/HTC/PLT: 10.48/8.5/26.4/253 (03-22-24)  BMP - Na/K/Cl/Bicarb/BUN/Cr/Gluc/AG/eGFR: 141/4.1/105/25/16/0.88/325/11/70 (03-22-24)  Ca - 9.5 (03-22-24)  Phos - 2.6 (03-22-24)  Mg - 2.1 (03-22-24)  LFT - Alb/Tprot/Tbili/Dbili/AlkPhos/ALT/AST: 4.0/--/0.4/--/56/31/33 (03-22-24)  PT/aPTT/INR: 12.5/30.3/1.10 (03-22-24)   Thyroid Stimulating Hormone, Serum: 1.530 (03-12-24)      MEDICATIONS  MEDICATIONS  (STANDING):  acetaminophen     Tablet .. 975 milliGRAM(s) Oral every 6 hours  aMIOdarone    Tablet   Oral   aMIOdarone    Tablet 400 milliGRAM(s) Oral every 8 hours  ascorbic acid 500 milliGRAM(s) Oral two times a day  aspirin enteric coated 81 milliGRAM(s) Oral daily  azithromycin  IVPB 500 milliGRAM(s) IV Intermittent every 24 hours  cefTRIAXone   IVPB      cefTRIAXone   IVPB 1000 milliGRAM(s) IV Intermittent every 24 hours  chlorhexidine 0.12% Liquid 15 milliLiter(s) Oral Mucosa every 12 hours  chlorhexidine 2% Cloths 1 Application(s) Topical daily  dextrose 5%. 1000 milliLiter(s) (50 mL/Hr) IV Continuous <Continuous>  dextrose 5%. 1000 milliLiter(s) (100 mL/Hr) IV Continuous <Continuous>  dextrose 50% Injectable 25 Gram(s) IV Push once  dextrose 50% Injectable 50 milliLiter(s) IV Push every 15 minutes  dextrose 50% Injectable 25 milliLiter(s) IV Push every 15 minutes  gabapentin 100 milliGRAM(s) Oral three times a day  glucagon  Injectable 1 milliGRAM(s) IntraMuscular once  glycopyrrolate 9 MICROgram(s)/formoterol 4.8 MICROgram(s) Inhaler 2 Puff(s) Inhalation two times a day  heparin   Injectable 5000 Unit(s) SubCutaneous every 8 hours  insulin glargine Injectable (LANTUS) 28 Unit(s) SubCutaneous at bedtime  insulin lispro (ADMELOG) corrective regimen sliding scale   SubCutaneous Before meals and at bedtime  insulin lispro Injectable (ADMELOG) 15 Unit(s) SubCutaneous three times a day before meals  metoprolol tartrate 12.5 milliGRAM(s) Oral every 12 hours  mupirocin 2% Ointment 1 Application(s) Both Nostrils two times a day  pantoprazole    Tablet 40 milliGRAM(s) Oral before breakfast  phenylephrine    Infusion 0.1 MICROgram(s)/kG/Min (2.1 mL/Hr) IV Continuous <Continuous>  polyethylene glycol 3350 17 Gram(s) Oral daily  senna 2 Tablet(s) Oral at bedtime  sodium chloride 0.9%. 1000 milliLiter(s) (10 mL/Hr) IV Continuous <Continuous>    MEDICATIONS  (PRN):  dextrose Oral Gel 15 Gram(s) Oral once PRN Blood Glucose LESS THAN 70 milliGRAM(s)/deciliter  oxyCODONE    IR 5 milliGRAM(s) Oral every 6 hours PRN Moderate Pain (4 - 6)  oxyCODONE    IR 10 milliGRAM(s) Oral every 6 hours PRN Severe Pain (7 - 10)         SUBJECTIVE / INTERVAL HPI: Patient was seen and examined this morning.  White count improved. Afebrile and denies fever/chills. Reports cough yesterday, but none today. Small b/l effusions and sputum with gram + rods. Started on ceftriaxone and azithromycin for PNA yesterday. Eating well, but skipped lunch because she had big breakfast. Glucose still elevated, likely frmm stress and possibly of infection.    CAPILLARY BLOOD GLUCOSE & INSULIN RECEIVED  316 mg/dL (03-21 @ 10:50) - Lispro 5+8. NPH 6  294 mg/dL (03-21 @ 16:04)  284 mg/dL (03-21 @ 16:09) - Lispro 10+6. Ate turkey, sweet potato and cheesecake  264 mg/dL (03-21 @ 21:54) - Lantus 28 + lispro 6  188 mg/dL (03-22 @ 02:49)  272 mg/dL (03-22 @ 07:24) - Lispro 10+6. Ate english muffin, cornflakes, and milk.  289 mg/dL (03-22 @ 11:00)   325 mg/dL (03-22 @ 11:06)  298 mg/dL (03-22 @ 11:55) - NPH 10 + lispro 15 + 6      REVIEW OF SYSTEMS  Constitutional:  Negative fever, chills or loss of appetite.  Eyes:  Negative blurry vision or double vision.  Cardiovascular:  Negative for chest pain or palpitations.  Respiratory:  Negative for cough, wheezing, or shortness of breath.    Gastrointestinal:  Negative for nausea, vomiting, diarrhea, constipation, or abdominal pain.  Genitourinary:  Negative frequency, urgency or dysuria.  Neurologic:  No headache, confusion, dizziness, lightheadedness.    PHYSICAL EXAM  Vital Signs Last 24 Hrs  T(C): 36.3 (22 Mar 2024 08:42), Max: 37 (22 Mar 2024 05:30)  T(F): 97.4 (22 Mar 2024 08:42), Max: 98.6 (22 Mar 2024 05:30)  HR: 52 (22 Mar 2024 13:00) (52 - 142)  BP: --  BP(mean): --  RR: 15 (22 Mar 2024 13:00) (15 - 20)  SpO2: 100% (22 Mar 2024 13:00) (91% - 100%)    Parameters below as of 22 Mar 2024 13:00  Patient On (Oxygen Delivery Method): nasal cannula w/ humidification  O2 Flow (L/min): 3    Constitutional: Awake, alert, in no acute distress.   HEENT: Normocephalic, atraumatic, GEORGETTE, no proptosis or lid retraction.   Neck: supple, no acanthosis, no thyromegaly or palpable thyroid nodules.  Respiratory: Lungs clear to ausculation bilaterally. + CT  Cardiovascular: regular rhythm, normal S1 and S2, + murmur + sternotomy  GI: soft, non-tender, non-distended, bowel sounds present, no masses appreciated.  Extremities: No lower extremity edema, peripheral pulses present.   Skin: no rashes.   Psychiatric: AAO x 3. Normal affect/mood.     LABS  CBC - WBC/HGB/HTC/PLT: 10.48/8.5/26.4/253 (03-22-24)  BMP - Na/K/Cl/Bicarb/BUN/Cr/Gluc/AG/eGFR: 141/4.1/105/25/16/0.88/325/11/70 (03-22-24)  Ca - 9.5 (03-22-24)  Phos - 2.6 (03-22-24)  Mg - 2.1 (03-22-24)  LFT - Alb/Tprot/Tbili/Dbili/AlkPhos/ALT/AST: 4.0/--/0.4/--/56/31/33 (03-22-24)  PT/aPTT/INR: 12.5/30.3/1.10 (03-22-24)   Thyroid Stimulating Hormone, Serum: 1.530 (03-12-24)      MEDICATIONS  MEDICATIONS  (STANDING):  acetaminophen     Tablet .. 975 milliGRAM(s) Oral every 6 hours  aMIOdarone    Tablet   Oral   aMIOdarone    Tablet 400 milliGRAM(s) Oral every 8 hours  ascorbic acid 500 milliGRAM(s) Oral two times a day  aspirin enteric coated 81 milliGRAM(s) Oral daily  azithromycin  IVPB 500 milliGRAM(s) IV Intermittent every 24 hours  cefTRIAXone   IVPB      cefTRIAXone   IVPB 1000 milliGRAM(s) IV Intermittent every 24 hours  chlorhexidine 0.12% Liquid 15 milliLiter(s) Oral Mucosa every 12 hours  chlorhexidine 2% Cloths 1 Application(s) Topical daily  dextrose 5%. 1000 milliLiter(s) (50 mL/Hr) IV Continuous <Continuous>  dextrose 5%. 1000 milliLiter(s) (100 mL/Hr) IV Continuous <Continuous>  dextrose 50% Injectable 25 Gram(s) IV Push once  dextrose 50% Injectable 50 milliLiter(s) IV Push every 15 minutes  dextrose 50% Injectable 25 milliLiter(s) IV Push every 15 minutes  gabapentin 100 milliGRAM(s) Oral three times a day  glucagon  Injectable 1 milliGRAM(s) IntraMuscular once  glycopyrrolate 9 MICROgram(s)/formoterol 4.8 MICROgram(s) Inhaler 2 Puff(s) Inhalation two times a day  heparin   Injectable 5000 Unit(s) SubCutaneous every 8 hours  insulin glargine Injectable (LANTUS) 28 Unit(s) SubCutaneous at bedtime  insulin lispro (ADMELOG) corrective regimen sliding scale   SubCutaneous Before meals and at bedtime  insulin lispro Injectable (ADMELOG) 15 Unit(s) SubCutaneous three times a day before meals  metoprolol tartrate 12.5 milliGRAM(s) Oral every 12 hours  mupirocin 2% Ointment 1 Application(s) Both Nostrils two times a day  pantoprazole    Tablet 40 milliGRAM(s) Oral before breakfast  phenylephrine    Infusion 0.1 MICROgram(s)/kG/Min (2.1 mL/Hr) IV Continuous <Continuous>  polyethylene glycol 3350 17 Gram(s) Oral daily  senna 2 Tablet(s) Oral at bedtime  sodium chloride 0.9%. 1000 milliLiter(s) (10 mL/Hr) IV Continuous <Continuous>    MEDICATIONS  (PRN):  dextrose Oral Gel 15 Gram(s) Oral once PRN Blood Glucose LESS THAN 70 milliGRAM(s)/deciliter  oxyCODONE    IR 5 milliGRAM(s) Oral every 6 hours PRN Moderate Pain (4 - 6)  oxyCODONE    IR 10 milliGRAM(s) Oral every 6 hours PRN Severe Pain (7 - 10)

## 2024-03-22 NOTE — PROGRESS NOTE ADULT - PROBLEM SELECTOR PLAN 1
Type 2 diabetes mellitus with hyperglycemia  - Please increase lantus to   units at bedtime.   - Increase lispro to units with meals.  - Continue lispro moderate dose sliding scale before meals and at bedtime.  - Patient's fingerstick glucose goal is 100-180 mg/dL.    - Consistent carb diet.  - For discharge, patient can ***.    - Patient can follow up at discharge with [facility] by calling [phone] to make an appointment.      Discussed with Dr Huffman. Primary team updated. Type 2 diabetes mellitus with hyperglycemia  - Please increase lantus to 40  units at bedtime.   - Increase lispro to 15 units with meals.  - Continue lispro moderate dose sliding scale before meals and at bedtime.  - Patient's fingerstick glucose goal is 100-180 mg/dL.    - Consistent carb diet.  - For discharge, patient can ***.    - Patient can follow up at discharge with [facility] by calling [phone] to make an appointment.      Discussed with Dr Huffman. Primary team updated.

## 2024-03-22 NOTE — PROGRESS NOTE ADULT - ASSESSMENT
72 y/o female PMH DM, severe AS, EF 60%, HTN, HLD, anemia who was admitted to [facility] with syncope. CT negative for CVA, but echo showed severe AS s/p SAVR 3/19/2024.    Endocrine consulted for T2 diabetes management.     A1C: 9.6 %  C-peptide 1.8 with  - March 2024  BUN: 16  Creatinine: 0.88  GFR: 70  Weight: 55.9  BMI: 22.5  EF: 60%

## 2024-03-22 NOTE — PROGRESS NOTE ADULT - SUBJECTIVE AND OBJECTIVE BOX
CTICU  CRITICAL  CARE  attending     Hand off received 					   Pertinent clinical, laboratory, radiographic, hemodynamic, echocardiographic, respiratory data, microbiologic data and chart were reviewed and analyzed frequently throughout the course of the day and night  Patient seen and examined with CTS/ SH attending at bedside    Pt is a 73y , Female, pod # 1 s/p 3 s/p Bio Brittanyall    post op:    remains extubated    overnight/today:    afib/RVR  acute post hemorrhagic anemia  post thoracotomy pain    74 y/o female PMH DM, severe AS, EF 60%, HTN, HLD, anemia who was admitted to [facility] with syncope. CT negative for CVA. subsequent echo showed severe AS. Transferred to [facility] under Dr. Moon for evaluation for TAVR vs BAV.      Aortic stenosis, severe    HTN (hypertension)    Hyperlipidemia    Type 2 diabetes mellitus        , FAMILY HISTORY:  No pertinent family history in first degree relatives    PAST MEDICAL & SURGICAL HISTORY:  Aortic stenosis      HTN (hypertension)      DM (diabetes mellitus)      Hyperlipidemia      Anemia      No significant past surgical history        Patient is a 73y old  Female who presents with a chief complaint of severe AS (22 Mar 2024 14:10)      14 system review limited 2/2 post op morbidity    Vital signs, hemodynamic and respiratory parameters were reviewed from the bedside nursing flowsheet.  ICU Vital Signs Last 24 Hrs  T(C): 36.1 (22 Mar 2024 16:55), Max: 37 (22 Mar 2024 05:30)  T(F): 96.9 (22 Mar 2024 16:55), Max: 98.6 (22 Mar 2024 05:30)  HR: 59 (22 Mar 2024 16:00) (52 - 142)  BP: --  BP(mean): --  ABP: 119/49 (22 Mar 2024 16:00) (94/49 - 152/59)  ABP(mean): 71 (22 Mar 2024 16:00) (64 - 86)  RR: 18 (22 Mar 2024 16:00) (15 - 20)  SpO2: 100% (22 Mar 2024 16:00) (97% - 100%)    O2 Parameters below as of 22 Mar 2024 16:00  Patient On (Oxygen Delivery Method): nasal cannula w/ humidification  O2 Flow (L/min): 3        Adult Advanced Hemodynamics Last 24 Hrs  CVP(mm Hg): 22 (22 Mar 2024 16:00) (12 - 29)  CVP(cm H2O): --  CO: --  CI: --  PA: --  PA(mean): --  PCWP: --  SVR: --  SVRI: --  PVR: --  PVRI: --, ABG - ( 22 Mar 2024 11:12 )  pH, Arterial: 7.44  pH, Blood: x     /  pCO2: 33    /  pO2: 133   / HCO3: 22    / Base Excess: -1.1  /  SaO2: 99.2                Intake and output was reviewed and the fluid balance was calculated  Daily     Daily   I&O's Summary    21 Mar 2024 07:01  -  22 Mar 2024 07:00  --------------------------------------------------------  IN: 999.9 mL / OUT: 1090 mL / NET: -90.1 mL    22 Mar 2024 07:01  -  22 Mar 2024 17:42  --------------------------------------------------------  IN: 450.7 mL / OUT: 70 mL / NET: 380.7 mL        All lines and drain sites were assessed  [facility]      POCT Blood Glucose.: 78 mg/dL (22 Mar 2024 17:01)    No acute change in mental status  Auscultation of the chest reveals equal bs  Abdomen is soft  Extremities are warm and well perfused  Wounds appear clean and unremarkable  Antibiotics are periop    labs  CBC Full  -  ( 22 Mar 2024 11:06 )  WBC Count : 10.48 K/uL  RBC Count : 3.13 M/uL  Hemoglobin : 8.5 g/dL  Hematocrit : 26.4 %  Platelet Count - Automated : 253 K/uL  Mean Cell Volume : 84.3 fl  Mean Cell Hemoglobin : 27.2 pg  Mean Cell Hemoglobin Concentration : 32.2 gm/dL  Auto Neutrophil # : 8.55 K/uL  Auto Lymphocyte # : 0.81 K/uL  Auto Monocyte # : 1.00 K/uL  Auto Eosinophil # : 0.05 K/uL  Auto Basophil # : 0.02 K/uL  Auto Neutrophil % : 81.6 %  Auto Lymphocyte % : 7.7 %  Auto Monocyte % : 9.5 %  Auto Eosinophil % : 0.5 %  Auto Basophil % : 0.2 %    03-22    141  |  105  |  16  ----------------------------<  325<H>  4.1   |  25  |  0.88    Ca    9.5      22 Mar 2024 11:06  Phos  2.6     03-22  Mg     2.1     03-22    TPro  6.0  /  Alb  4.0  /  TBili  0.4  /  DBili  x   /  AST  33  /  ALT  31  /  AlkPhos  56  03-22    PT/INR - ( 22 Mar 2024 11:06 )   PT: 12.5 sec;   INR: 1.10          PTT - ( 22 Mar 2024 11:06 )  PTT:30.3 sec  The current medications were reviewed   MEDICATIONS  (STANDING):  acetaminophen     Tablet .. 975 milliGRAM(s) Oral every 6 hours  aMIOdarone    Tablet   Oral   aMIOdarone    Tablet 400 milliGRAM(s) Oral every 8 hours  ascorbic acid 500 milliGRAM(s) Oral two times a day  aspirin enteric coated 81 milliGRAM(s) Oral daily  azithromycin  IVPB 500 milliGRAM(s) IV Intermittent every 24 hours  cefTRIAXone   IVPB      cefTRIAXone   IVPB 1000 milliGRAM(s) IV Intermittent every 24 hours  chlorhexidine 0.12% Liquid 15 milliLiter(s) Oral Mucosa every 12 hours  chlorhexidine 2% Cloths 1 Application(s) Topical daily  dextrose 5%. 1000 milliLiter(s) (50 mL/Hr) IV Continuous <Continuous>  dextrose 5%. 1000 milliLiter(s) (100 mL/Hr) IV Continuous <Continuous>  dextrose 50% Injectable 25 milliLiter(s) IV Push every 15 minutes  dextrose 50% Injectable 25 Gram(s) IV Push once  dextrose 50% Injectable 50 milliLiter(s) IV Push every 15 minutes  gabapentin 100 milliGRAM(s) Oral three times a day  glucagon  Injectable 1 milliGRAM(s) IntraMuscular once  glycopyrrolate 9 MICROgram(s)/formoterol 4.8 MICROgram(s) Inhaler 2 Puff(s) Inhalation two times a day  heparin   Injectable 5000 Unit(s) SubCutaneous every 8 hours  insulin glargine Injectable (LANTUS) 40 Unit(s) SubCutaneous at bedtime  insulin lispro (ADMELOG) corrective regimen sliding scale   SubCutaneous Before meals and at bedtime  insulin lispro Injectable (ADMELOG) 15 Unit(s) SubCutaneous three times a day before meals  metoprolol tartrate 12.5 milliGRAM(s) Oral every 12 hours  mupirocin 2% Ointment 1 Application(s) Both Nostrils two times a day  pantoprazole    Tablet 40 milliGRAM(s) Oral before breakfast  polyethylene glycol 3350 17 Gram(s) Oral daily  senna 2 Tablet(s) Oral at bedtime  sodium chloride 0.9%. 1000 milliLiter(s) (10 mL/Hr) IV Continuous <Continuous>    MEDICATIONS  (PRN):  dextrose Oral Gel 15 Gram(s) Oral once PRN Blood Glucose LESS THAN 70 milliGRAM(s)/deciliter  oxyCODONE    IR 5 milliGRAM(s) Oral every 6 hours PRN Moderate Pain (4 - 6)  oxyCODONE    IR 10 milliGRAM(s) Oral every 6 hours PRN Severe Pain (7 - 10)       PROBLEM LIST/ ASSESSMENT:  HEALTH ISSUES - PROBLEM Dx:  Aortic stenosis, severe    HTN (hypertension)    Hyperlipidemia    Type 2 diabetes mellitus        ,   Patient is a 73y old  Female who presents with a chief complaint of severe AS (22 Mar 2024 14:10)     s/p Bio bentall       My plan includes :    Amiodarone loading for  Afib/RVR  beta blockers; titrate as tolerated  Maintain MAP >65-70  Trend H/H  Supplemental O2 as needed  Strict blood sugar control    close hemodynamic, ventilatory and drain monitoring and management per post op routine    Monitor for arrhythmias and monitor parameters for organ perfusion  monitor neurologic status  Head of the bed should remain elevated to 45 deg .   chest PT and IS will be encouraged  monitor adequacy of oxygenation and ventilation and attempt to wean oxygen  Nutritional goals will be met using po eventually , ensure adequate caloric intake and montior the same  Stress ulcer and VTE prophylaxis will be achieved    Glycemic control is satisfactory  Electrolytes have been repleted as necessary and wound care has been carried out. Pain control has been achieved.   agressive physical therapy and early mobility and ambulation goals will be met   The family was updated about the course and plan  CRITICAL CARE TIME SPENT in evaluation and management, reassessments, review and interpretation of labs and x-rays, ventilator and hemodynamic management, formulating a plan and coordinating care: ___30___ MIN.  Time does not include procedural time.  CTICU ATTENDING     					    Saravanan Wilson MD

## 2024-03-22 NOTE — PROGRESS NOTE ADULT - SUBJECTIVE AND OBJECTIVE BOX
CTICU  CRITICAL  CARE  attending     Hand off received 					   Pertinent clinical, laboratory, radiographic, hemodynamic, echocardiographic, respiratory data, microbiologic data and chart were reviewed and analyzed frequently throughout the course of the day and night        73 year old female with severe AS, EF 60%, DM, HTN, HLD, anemia.  She was admitted to [facility] with syncope.   CT Head was negative for CVA.   ECHO showed severe AS.   She was transferred to [facility] under Dr. Moon for evaluation for TAVR vs BAV.  LHC: Non obstructive CAD    S/P AVR.      Major HEALTH ISSUES -  Uncontrolled DM.  Aortic stenosis,  HTN (hypertension)  Hyperlipidemia  Type 2 diabetes mellitus        FAMILY HISTORY:  No pertinent family history in first degree relatives    PAST MEDICAL & SURGICAL HISTORY:  Aortic stenosis  HTN (hypertension)  DM (diabetes mellitus)  Hyperlipidemia  Anemia        14 system review was unremarkable    Vital signs, hemodynamic and respiratory parameters were reviewed from the bedside nursing flow sheet.  ICU Vital Signs Last 24 Hrs  T(C): 36 (22 Mar 2024 21:02), Max: 37 (22 Mar 2024 05:30)  T(F): 96.8 (22 Mar 2024 21:02), Max: 98.6 (22 Mar 2024 05:30)  HR: 53 (22 Mar 2024 22:00) (52 - 142)  BP: 119/58 (22 Mar 2024 22:00) (70/46 - 120/59)  BP(mean): 83 (22 Mar 2024 22:00) (54 - 85)  ABP: 123/53 (22 Mar 2024 18:00) (94/49 - 134/56)  ABP(mean): 74 (22 Mar 2024 18:00) (64 - 80)  RR: 17 (22 Mar 2024 22:00) (15 - 19)  SpO2: 100% (22 Mar 2024 22:00) (97% - 100%)    O2 Parameters below as of 22 Mar 2024 22:00  Patient On (Oxygen Delivery Method): nasal cannula w/ humidification  O2 Flow (L/min): 3        Adult Advanced Hemodynamics Last 24 Hrs  CVP(mm Hg): 44 (22 Mar 2024 18:00) (17 - 53)  CVP(cm H2O): --  CO: --  CI: --  PA: --  PA(mean): --  PCWP: --  SVR: --  SVRI: --  PVR: --  PVRI: --, ABG - ( 22 Mar 2024 11:12 )  pH, Arterial: 7.44  pH, Blood: x     /  pCO2: 33    /  pO2: 133   / HCO3: 22    / Base Excess: -1.1  /  SaO2: 99.2                Intake and output was reviewed and the fluid balance was calculated  Daily     Daily   I&O's Summary    21 Mar 2024 07:01  -  22 Mar 2024 07:00  --------------------------------------------------------  IN: 999.9 mL / OUT: 1090 mL / NET: -90.1 mL    22 Mar 2024 07:01  -  22 Mar 2024 23:01  --------------------------------------------------------  IN: 780.7 mL / OUT: 80 mL / NET: 700.7 mL            Neuro: No change in the Neuro status from the baseline.   Neck: No JVD.  CVS: S1, S2, No S3.  Lungs: Good air entry bilaterally.  Abd: Soft. No tenderness. + Bowel sounds.  Vascular: + DP/PT.  Extremities: Lower Extremity Edema.  Lymphatic: Normal.  Skin: No abnormalities.      labs  CBC Full  -  ( 22 Mar 2024 11:06 )  WBC Count : 10.48 K/uL  RBC Count : 3.13 M/uL  Hemoglobin : 8.5 g/dL  Hematocrit : 26.4 %  Platelet Count - Automated : 253 K/uL  Mean Cell Volume : 84.3 fl  Mean Cell Hemoglobin : 27.2 pg  Mean Cell Hemoglobin Concentration : 32.2 gm/dL  Auto Neutrophil # : 8.55 K/uL  Auto Lymphocyte # : 0.81 K/uL  Auto Monocyte # : 1.00 K/uL  Auto Eosinophil # : 0.05 K/uL  Auto Basophil # : 0.02 K/uL  Auto Neutrophil % : 81.6 %  Auto Lymphocyte % : 7.7 %  Auto Monocyte % : 9.5 %  Auto Eosinophil % : 0.5 %  Auto Basophil % : 0.2 %    03-22    141  |  105  |  16  ----------------------------<  325<H>  4.1   |  25  |  0.88    Ca    9.5      22 Mar 2024 11:06  Phos  2.6     03-22  Mg     2.1     03-22    TPro  6.0  /  Alb  4.0  /  TBili  0.4  /  DBili  x   /  AST  33  /  ALT  31  /  AlkPhos  56  03-22    PT/INR - ( 22 Mar 2024 11:06 )   PT: 12.5 sec;   INR: 1.10          PTT - ( 22 Mar 2024 11:06 )  PTT:30.3 sec  The current medications were reviewed   MEDICATIONS  (STANDING):  acetaminophen     Tablet .. 975 milliGRAM(s) Oral every 6 hours  aMIOdarone    Tablet   Oral   aMIOdarone    Tablet 400 milliGRAM(s) Oral every 8 hours  ascorbic acid 500 milliGRAM(s) Oral two times a day  aspirin enteric coated 81 milliGRAM(s) Oral daily  azithromycin  IVPB 500 milliGRAM(s) IV Intermittent every 24 hours  cefTRIAXone   IVPB      cefTRIAXone   IVPB 1000 milliGRAM(s) IV Intermittent every 24 hours  chlorhexidine 0.12% Liquid 15 milliLiter(s) Oral Mucosa every 12 hours  chlorhexidine 2% Cloths 1 Application(s) Topical daily  dextrose 5%. 1000 milliLiter(s) (50 mL/Hr) IV Continuous <Continuous>  dextrose 5%. 1000 milliLiter(s) (100 mL/Hr) IV Continuous <Continuous>  dextrose 50% Injectable 25 Gram(s) IV Push once  dextrose 50% Injectable 50 milliLiter(s) IV Push every 15 minutes  dextrose 50% Injectable 25 milliLiter(s) IV Push every 15 minutes  gabapentin 100 milliGRAM(s) Oral three times a day  glucagon  Injectable 1 milliGRAM(s) IntraMuscular once  glycopyrrolate 9 MICROgram(s)/formoterol 4.8 MICROgram(s) Inhaler 2 Puff(s) Inhalation two times a day  heparin   Injectable 5000 Unit(s) SubCutaneous every 8 hours  insulin glargine Injectable (LANTUS) 40 Unit(s) SubCutaneous at bedtime  insulin lispro (ADMELOG) corrective regimen sliding scale   SubCutaneous Before meals and at bedtime  insulin lispro Injectable (ADMELOG) 15 Unit(s) SubCutaneous three times a day before meals  metoprolol tartrate 12.5 milliGRAM(s) Oral every 12 hours  mupirocin 2% Ointment 1 Application(s) Both Nostrils two times a day  pantoprazole    Tablet 40 milliGRAM(s) Oral before breakfast  polyethylene glycol 3350 17 Gram(s) Oral daily  senna 2 Tablet(s) Oral at bedtime  sodium chloride 0.9%. 1000 milliLiter(s) (10 mL/Hr) IV Continuous <Continuous>    MEDICATIONS  (PRN):  dextrose Oral Gel 15 Gram(s) Oral once PRN Blood Glucose LESS THAN 70 milliGRAM(s)/deciliter  oxyCODONE    IR 5 milliGRAM(s) Oral every 6 hours PRN Moderate Pain (4 - 6)  oxyCODONE    IR 10 milliGRAM(s) Oral every 6 hours PRN Severe Pain (7 - 10)      PROBLEM LIST/ ASSESSMENT:  HEALTH ISSUES - PROBLEM Dx:  Aortic stenosis, severe  HTN (hypertension)  Hyperlipidemia  Type 2 diabetes mellitus        73 year old  Female admitted with severe Aortic Stenosis  S/P AVR  Uncontrolled DM  Hemodynamically stable.  Good oxygenation.  Fair urine out put.        My plan includes :  OPTIMIZE Glycemic control initially with insulin infusion followed by long acting insulin and insulin with meals.  Afterload reduction.  Statin Rx  Amiodarone and Betablocker.  IV antibiotic Rx.  Close hemodynamic monitoring   Monitor for arrhythmias and monitor parameters for organ perfusion  Monitor neurologic status  Monitor renal function.  Head of the bed should remain elevated to 45 deg .   Chest PT and IS will be encouraged  Monitor adequacy of oxygenation   Nutritional goals will be met using po eventually , ensure adequate caloric intake and monitor the same  Stress ulcer and VTE prophylaxis will be achieved    Electrolytes have been repleted as necessary and wound care has been carried out. Pain control has been achieved.   Aggressive physical therapy and early mobility and ambulation goals will be met   The family was updated about the course and plan  CRITICAL CARE TIME SPENT in evaluation and management, review and interpretation of labs and x-rays, hemodynamic management, formulating a plan and coordinating care: ___30____ MIN.  Time does not include procedural time.  CTICU ATTENDING     					    Subhash Gonzalez MD

## 2024-03-23 ENCOUNTER — RESULT REVIEW (OUTPATIENT)
Age: 74
End: 2024-03-23

## 2024-03-23 DIAGNOSIS — J90 PLEURAL EFFUSION, NOT ELSEWHERE CLASSIFIED: ICD-10-CM

## 2024-03-23 DIAGNOSIS — I51.89 OTHER ILL-DEFINED HEART DISEASES: ICD-10-CM

## 2024-03-23 LAB
ALBUMIN SERPL ELPH-MCNC: 3.4 G/DL — SIGNIFICANT CHANGE UP (ref 3.3–5)
ALBUMIN SERPL ELPH-MCNC: 3.7 G/DL — SIGNIFICANT CHANGE UP (ref 3.3–5)
ALBUMIN SERPL ELPH-MCNC: 3.9 G/DL — SIGNIFICANT CHANGE UP (ref 3.3–5)
ALBUMIN SERPL ELPH-MCNC: 4 G/DL — SIGNIFICANT CHANGE UP (ref 3.3–5)
ALP SERPL-CCNC: 70 U/L — SIGNIFICANT CHANGE UP (ref 40–120)
ALP SERPL-CCNC: 72 U/L — SIGNIFICANT CHANGE UP (ref 40–120)
ALP SERPL-CCNC: 73 U/L — SIGNIFICANT CHANGE UP (ref 40–120)
ALP SERPL-CCNC: 75 U/L — SIGNIFICANT CHANGE UP (ref 40–120)
ALT FLD-CCNC: 133 U/L — HIGH (ref 10–45)
ALT FLD-CCNC: 138 U/L — HIGH (ref 10–45)
ALT FLD-CCNC: 147 U/L — HIGH (ref 10–45)
ALT FLD-CCNC: 184 U/L — HIGH (ref 10–45)
ANION GAP SERPL CALC-SCNC: 11 MMOL/L — SIGNIFICANT CHANGE UP (ref 5–17)
ANION GAP SERPL CALC-SCNC: 11 MMOL/L — SIGNIFICANT CHANGE UP (ref 5–17)
ANION GAP SERPL CALC-SCNC: 12 MMOL/L — SIGNIFICANT CHANGE UP (ref 5–17)
ANION GAP SERPL CALC-SCNC: 13 MMOL/L — SIGNIFICANT CHANGE UP (ref 5–17)
APTT BLD: 28.4 SEC — SIGNIFICANT CHANGE UP (ref 24.5–35.6)
APTT BLD: 29.1 SEC — SIGNIFICANT CHANGE UP (ref 24.5–35.6)
APTT BLD: 30.7 SEC — SIGNIFICANT CHANGE UP (ref 24.5–35.6)
AST SERPL-CCNC: 132 U/L — HIGH (ref 10–40)
AST SERPL-CCNC: 173 U/L — HIGH (ref 10–40)
AST SERPL-CCNC: 193 U/L — HIGH (ref 10–40)
AST SERPL-CCNC: 201 U/L — HIGH (ref 10–40)
BASE EXCESS BLDV CALC-SCNC: 1.2 MMOL/L — SIGNIFICANT CHANGE UP (ref -2–3)
BASOPHILS # BLD AUTO: 0.01 K/UL — SIGNIFICANT CHANGE UP (ref 0–0.2)
BASOPHILS # BLD AUTO: 0.02 K/UL — SIGNIFICANT CHANGE UP (ref 0–0.2)
BASOPHILS # BLD AUTO: 0.03 K/UL — SIGNIFICANT CHANGE UP (ref 0–0.2)
BASOPHILS NFR BLD AUTO: 0.1 % — SIGNIFICANT CHANGE UP (ref 0–2)
BASOPHILS NFR BLD AUTO: 0.2 % — SIGNIFICANT CHANGE UP (ref 0–2)
BASOPHILS NFR BLD AUTO: 0.2 % — SIGNIFICANT CHANGE UP (ref 0–2)
BILIRUB SERPL-MCNC: 0.5 MG/DL — SIGNIFICANT CHANGE UP (ref 0.2–1.2)
BILIRUB SERPL-MCNC: 0.5 MG/DL — SIGNIFICANT CHANGE UP (ref 0.2–1.2)
BILIRUB SERPL-MCNC: 0.6 MG/DL — SIGNIFICANT CHANGE UP (ref 0.2–1.2)
BILIRUB SERPL-MCNC: 0.7 MG/DL — SIGNIFICANT CHANGE UP (ref 0.2–1.2)
BLD GP AB SCN SERPL QL: NEGATIVE — SIGNIFICANT CHANGE UP
BUN SERPL-MCNC: 21 MG/DL — SIGNIFICANT CHANGE UP (ref 7–23)
BUN SERPL-MCNC: 24 MG/DL — HIGH (ref 7–23)
BUN SERPL-MCNC: 25 MG/DL — HIGH (ref 7–23)
BUN SERPL-MCNC: 27 MG/DL — HIGH (ref 7–23)
CA-I SERPL-SCNC: 1.34 MMOL/L — HIGH (ref 1.15–1.33)
CALCIUM SERPL-MCNC: 9.3 MG/DL — SIGNIFICANT CHANGE UP (ref 8.4–10.5)
CALCIUM SERPL-MCNC: 9.6 MG/DL — SIGNIFICANT CHANGE UP (ref 8.4–10.5)
CALCIUM SERPL-MCNC: 9.8 MG/DL — SIGNIFICANT CHANGE UP (ref 8.4–10.5)
CALCIUM SERPL-MCNC: 9.8 MG/DL — SIGNIFICANT CHANGE UP (ref 8.4–10.5)
CHLORIDE SERPL-SCNC: 106 MMOL/L — SIGNIFICANT CHANGE UP (ref 96–108)
CHLORIDE SERPL-SCNC: 106 MMOL/L — SIGNIFICANT CHANGE UP (ref 96–108)
CHLORIDE SERPL-SCNC: 108 MMOL/L — SIGNIFICANT CHANGE UP (ref 96–108)
CHLORIDE SERPL-SCNC: 108 MMOL/L — SIGNIFICANT CHANGE UP (ref 96–108)
CO2 BLDV-SCNC: 27.3 MMOL/L — HIGH (ref 22–26)
CO2 SERPL-SCNC: 20 MMOL/L — LOW (ref 22–31)
CO2 SERPL-SCNC: 22 MMOL/L — SIGNIFICANT CHANGE UP (ref 22–31)
CO2 SERPL-SCNC: 23 MMOL/L — SIGNIFICANT CHANGE UP (ref 22–31)
CO2 SERPL-SCNC: 25 MMOL/L — SIGNIFICANT CHANGE UP (ref 22–31)
CREAT SERPL-MCNC: 0.96 MG/DL — SIGNIFICANT CHANGE UP (ref 0.5–1.3)
CREAT SERPL-MCNC: 1.15 MG/DL — SIGNIFICANT CHANGE UP (ref 0.5–1.3)
CREAT SERPL-MCNC: 1.16 MG/DL — SIGNIFICANT CHANGE UP (ref 0.5–1.3)
CREAT SERPL-MCNC: 1.22 MG/DL — SIGNIFICANT CHANGE UP (ref 0.5–1.3)
CULTURE RESULTS: ABNORMAL
EGFR: 47 ML/MIN/1.73M2 — LOW
EGFR: 50 ML/MIN/1.73M2 — LOW
EGFR: 51 ML/MIN/1.73M2 — LOW
EGFR: 63 ML/MIN/1.73M2 — SIGNIFICANT CHANGE UP
EOSINOPHIL # BLD AUTO: 0.01 K/UL — SIGNIFICANT CHANGE UP (ref 0–0.5)
EOSINOPHIL # BLD AUTO: 0.02 K/UL — SIGNIFICANT CHANGE UP (ref 0–0.5)
EOSINOPHIL # BLD AUTO: 0.02 K/UL — SIGNIFICANT CHANGE UP (ref 0–0.5)
EOSINOPHIL NFR BLD AUTO: 0.1 % — SIGNIFICANT CHANGE UP (ref 0–6)
EOSINOPHIL NFR BLD AUTO: 0.2 % — SIGNIFICANT CHANGE UP (ref 0–6)
EOSINOPHIL NFR BLD AUTO: 0.2 % — SIGNIFICANT CHANGE UP (ref 0–6)
GAS PNL BLDV: 139 MMOL/L — SIGNIFICANT CHANGE UP (ref 136–145)
GAS PNL BLDV: SIGNIFICANT CHANGE UP
GAS PNL BLDV: SIGNIFICANT CHANGE UP
GLUCOSE BLDC GLUCOMTR-MCNC: 161 MG/DL — HIGH (ref 70–99)
GLUCOSE BLDC GLUCOMTR-MCNC: 216 MG/DL — HIGH (ref 70–99)
GLUCOSE BLDC GLUCOMTR-MCNC: 218 MG/DL — HIGH (ref 70–99)
GLUCOSE BLDC GLUCOMTR-MCNC: 238 MG/DL — HIGH (ref 70–99)
GLUCOSE SERPL-MCNC: 152 MG/DL — HIGH (ref 70–99)
GLUCOSE SERPL-MCNC: 215 MG/DL — HIGH (ref 70–99)
GLUCOSE SERPL-MCNC: 256 MG/DL — HIGH (ref 70–99)
GLUCOSE SERPL-MCNC: 330 MG/DL — HIGH (ref 70–99)
HCO3 BLDV-SCNC: 26 MMOL/L — SIGNIFICANT CHANGE UP (ref 22–29)
HCT VFR BLD CALC: 26.2 % — LOW (ref 34.5–45)
HCT VFR BLD CALC: 28.2 % — LOW (ref 34.5–45)
HCT VFR BLD CALC: 28.7 % — LOW (ref 34.5–45)
HCT VFR BLD CALC: 29.7 % — LOW (ref 34.5–45)
HGB BLD-MCNC: 8.1 G/DL — LOW (ref 11.5–15.5)
HGB BLD-MCNC: 8.9 G/DL — LOW (ref 11.5–15.5)
HGB BLD-MCNC: 9 G/DL — LOW (ref 11.5–15.5)
HGB BLD-MCNC: 9.2 G/DL — LOW (ref 11.5–15.5)
IMM GRANULOCYTES NFR BLD AUTO: 0.5 % — SIGNIFICANT CHANGE UP (ref 0–0.9)
IMM GRANULOCYTES NFR BLD AUTO: 0.7 % — SIGNIFICANT CHANGE UP (ref 0–0.9)
IMM GRANULOCYTES NFR BLD AUTO: 0.9 % — SIGNIFICANT CHANGE UP (ref 0–0.9)
INR BLD: 1.1 — SIGNIFICANT CHANGE UP (ref 0.85–1.18)
INR BLD: 1.1 — SIGNIFICANT CHANGE UP (ref 0.85–1.18)
INR BLD: 1.14 — SIGNIFICANT CHANGE UP (ref 0.85–1.18)
INR BLD: 1.17 — SIGNIFICANT CHANGE UP (ref 0.85–1.18)
LACTATE SERPL-SCNC: 1.3 MMOL/L — SIGNIFICANT CHANGE UP (ref 0.5–2)
LACTATE SERPL-SCNC: 1.6 MMOL/L — SIGNIFICANT CHANGE UP (ref 0.5–2)
LACTATE SERPL-SCNC: 3.3 MMOL/L — HIGH (ref 0.5–2)
LYMPHOCYTES # BLD AUTO: 0.71 K/UL — LOW (ref 1–3.3)
LYMPHOCYTES # BLD AUTO: 0.72 K/UL — LOW (ref 1–3.3)
LYMPHOCYTES # BLD AUTO: 0.84 K/UL — LOW (ref 1–3.3)
LYMPHOCYTES # BLD AUTO: 6.4 % — LOW (ref 13–44)
LYMPHOCYTES # BLD AUTO: 6.5 % — LOW (ref 13–44)
LYMPHOCYTES # BLD AUTO: 7 % — LOW (ref 13–44)
MAGNESIUM SERPL-MCNC: 2.2 MG/DL — SIGNIFICANT CHANGE UP (ref 1.6–2.6)
MAGNESIUM SERPL-MCNC: 2.5 MG/DL — SIGNIFICANT CHANGE UP (ref 1.6–2.6)
MCHC RBC-ENTMCNC: 27 PG — SIGNIFICANT CHANGE UP (ref 27–34)
MCHC RBC-ENTMCNC: 27.1 PG — SIGNIFICANT CHANGE UP (ref 27–34)
MCHC RBC-ENTMCNC: 27.3 PG — SIGNIFICANT CHANGE UP (ref 27–34)
MCHC RBC-ENTMCNC: 27.6 PG — SIGNIFICANT CHANGE UP (ref 27–34)
MCHC RBC-ENTMCNC: 30.9 GM/DL — LOW (ref 32–36)
MCHC RBC-ENTMCNC: 31 GM/DL — LOW (ref 32–36)
MCHC RBC-ENTMCNC: 31 GM/DL — LOW (ref 32–36)
MCHC RBC-ENTMCNC: 31.9 GM/DL — LOW (ref 32–36)
MCV RBC AUTO: 86.5 FL — SIGNIFICANT CHANGE UP (ref 80–100)
MCV RBC AUTO: 87.3 FL — SIGNIFICANT CHANGE UP (ref 80–100)
MCV RBC AUTO: 87.5 FL — SIGNIFICANT CHANGE UP (ref 80–100)
MCV RBC AUTO: 88.1 FL — SIGNIFICANT CHANGE UP (ref 80–100)
MONOCYTES # BLD AUTO: 0.88 K/UL — SIGNIFICANT CHANGE UP (ref 0–0.9)
MONOCYTES # BLD AUTO: 0.92 K/UL — HIGH (ref 0–0.9)
MONOCYTES # BLD AUTO: 1.08 K/UL — HIGH (ref 0–0.9)
MONOCYTES NFR BLD AUTO: 7.9 % — SIGNIFICANT CHANGE UP (ref 2–14)
MONOCYTES NFR BLD AUTO: 8.4 % — SIGNIFICANT CHANGE UP (ref 2–14)
MONOCYTES NFR BLD AUTO: 8.9 % — SIGNIFICANT CHANGE UP (ref 2–14)
NEUTROPHILS # BLD AUTO: 10.89 K/UL — HIGH (ref 1.8–7.4)
NEUTROPHILS # BLD AUTO: 8.58 K/UL — HIGH (ref 1.8–7.4)
NEUTROPHILS # BLD AUTO: 9.42 K/UL — HIGH (ref 1.8–7.4)
NEUTROPHILS NFR BLD AUTO: 83 % — HIGH (ref 43–77)
NEUTROPHILS NFR BLD AUTO: 84.2 % — HIGH (ref 43–77)
NEUTROPHILS NFR BLD AUTO: 84.6 % — HIGH (ref 43–77)
NRBC # BLD: 0 /100 WBCS — SIGNIFICANT CHANGE UP (ref 0–0)
PCO2 BLDV: 41 MMHG — SIGNIFICANT CHANGE UP (ref 39–42)
PH BLDV: 7.41 — SIGNIFICANT CHANGE UP (ref 7.32–7.43)
PHOSPHATE SERPL-MCNC: 3.4 MG/DL — SIGNIFICANT CHANGE UP (ref 2.5–4.5)
PHOSPHATE SERPL-MCNC: 3.9 MG/DL — SIGNIFICANT CHANGE UP (ref 2.5–4.5)
PHOSPHATE SERPL-MCNC: 4.6 MG/DL — HIGH (ref 2.5–4.5)
PLATELET # BLD AUTO: 284 K/UL — SIGNIFICANT CHANGE UP (ref 150–400)
PLATELET # BLD AUTO: 328 K/UL — SIGNIFICANT CHANGE UP (ref 150–400)
PLATELET # BLD AUTO: 329 K/UL — SIGNIFICANT CHANGE UP (ref 150–400)
PLATELET # BLD AUTO: 346 K/UL — SIGNIFICANT CHANGE UP (ref 150–400)
PO2 BLDV: 41 MMHG — SIGNIFICANT CHANGE UP (ref 25–45)
POTASSIUM BLDV-SCNC: 3.8 MMOL/L — SIGNIFICANT CHANGE UP (ref 3.5–5.1)
POTASSIUM SERPL-MCNC: 4.2 MMOL/L — SIGNIFICANT CHANGE UP (ref 3.5–5.3)
POTASSIUM SERPL-MCNC: 4.3 MMOL/L — SIGNIFICANT CHANGE UP (ref 3.5–5.3)
POTASSIUM SERPL-MCNC: 4.9 MMOL/L — SIGNIFICANT CHANGE UP (ref 3.5–5.3)
POTASSIUM SERPL-MCNC: 4.9 MMOL/L — SIGNIFICANT CHANGE UP (ref 3.5–5.3)
POTASSIUM SERPL-SCNC: 4.2 MMOL/L — SIGNIFICANT CHANGE UP (ref 3.5–5.3)
POTASSIUM SERPL-SCNC: 4.3 MMOL/L — SIGNIFICANT CHANGE UP (ref 3.5–5.3)
POTASSIUM SERPL-SCNC: 4.9 MMOL/L — SIGNIFICANT CHANGE UP (ref 3.5–5.3)
POTASSIUM SERPL-SCNC: 4.9 MMOL/L — SIGNIFICANT CHANGE UP (ref 3.5–5.3)
PROT SERPL-MCNC: 5.8 G/DL — LOW (ref 6–8.3)
PROT SERPL-MCNC: 5.8 G/DL — LOW (ref 6–8.3)
PROT SERPL-MCNC: 6 G/DL — SIGNIFICANT CHANGE UP (ref 6–8.3)
PROT SERPL-MCNC: 6.1 G/DL — SIGNIFICANT CHANGE UP (ref 6–8.3)
PROTHROM AB SERPL-ACNC: 12.5 SEC — SIGNIFICANT CHANGE UP (ref 9.5–13)
PROTHROM AB SERPL-ACNC: 12.5 SEC — SIGNIFICANT CHANGE UP (ref 9.5–13)
PROTHROM AB SERPL-ACNC: 12.9 SEC — SIGNIFICANT CHANGE UP (ref 9.5–13)
PROTHROM AB SERPL-ACNC: 13.3 SEC — HIGH (ref 9.5–13)
RBC # BLD: 3 M/UL — LOW (ref 3.8–5.2)
RBC # BLD: 3.26 M/UL — LOW (ref 3.8–5.2)
RBC # BLD: 3.28 M/UL — LOW (ref 3.8–5.2)
RBC # BLD: 3.37 M/UL — LOW (ref 3.8–5.2)
RBC # FLD: 15.8 % — HIGH (ref 10.3–14.5)
RBC # FLD: 15.8 % — HIGH (ref 10.3–14.5)
RBC # FLD: 16.1 % — HIGH (ref 10.3–14.5)
RBC # FLD: 16.2 % — HIGH (ref 10.3–14.5)
RH IG SCN BLD-IMP: POSITIVE — SIGNIFICANT CHANGE UP
SAO2 % BLDV: 64.3 % — LOW (ref 67–88)
SODIUM SERPL-SCNC: 140 MMOL/L — SIGNIFICANT CHANGE UP (ref 135–145)
SODIUM SERPL-SCNC: 140 MMOL/L — SIGNIFICANT CHANGE UP (ref 135–145)
SODIUM SERPL-SCNC: 141 MMOL/L — SIGNIFICANT CHANGE UP (ref 135–145)
SODIUM SERPL-SCNC: 144 MMOL/L — SIGNIFICANT CHANGE UP (ref 135–145)
SPECIMEN SOURCE: SIGNIFICANT CHANGE UP
WBC # BLD: 10.33 K/UL — SIGNIFICANT CHANGE UP (ref 3.8–10.5)
WBC # BLD: 11.13 K/UL — HIGH (ref 3.8–10.5)
WBC # BLD: 12.93 K/UL — HIGH (ref 3.8–10.5)
WBC # BLD: 16.91 K/UL — HIGH (ref 3.8–10.5)
WBC # FLD AUTO: 10.33 K/UL — SIGNIFICANT CHANGE UP (ref 3.8–10.5)
WBC # FLD AUTO: 11.13 K/UL — HIGH (ref 3.8–10.5)
WBC # FLD AUTO: 12.93 K/UL — HIGH (ref 3.8–10.5)
WBC # FLD AUTO: 16.91 K/UL — HIGH (ref 3.8–10.5)

## 2024-03-23 PROCEDURE — 36556 INSERT NON-TUNNEL CV CATH: CPT

## 2024-03-23 PROCEDURE — 71045 X-RAY EXAM CHEST 1 VIEW: CPT | Mod: 26,76

## 2024-03-23 PROCEDURE — 76604 US EXAM CHEST: CPT | Mod: 26

## 2024-03-23 PROCEDURE — 99292 CRITICAL CARE ADDL 30 MIN: CPT

## 2024-03-23 PROCEDURE — 93306 TTE W/DOPPLER COMPLETE: CPT | Mod: 26

## 2024-03-23 PROCEDURE — 93010 ELECTROCARDIOGRAM REPORT: CPT

## 2024-03-23 PROCEDURE — 32557 INSERT CATH PLEURA W/ IMAGE: CPT

## 2024-03-23 PROCEDURE — 99291 CRITICAL CARE FIRST HOUR: CPT

## 2024-03-23 PROCEDURE — 32555 ASPIRATE PLEURA W/ IMAGING: CPT

## 2024-03-23 RX ORDER — LABETALOL HCL 100 MG
10 TABLET ORAL ONCE
Refills: 0 | Status: DISCONTINUED | OUTPATIENT
Start: 2024-03-23 | End: 2024-03-23

## 2024-03-23 RX ORDER — ALBUMIN HUMAN 25 %
250 VIAL (ML) INTRAVENOUS ONCE
Refills: 0 | Status: COMPLETED | OUTPATIENT
Start: 2024-03-23 | End: 2024-03-23

## 2024-03-23 RX ORDER — FUROSEMIDE 40 MG
20 TABLET ORAL ONCE
Refills: 0 | Status: COMPLETED | OUTPATIENT
Start: 2024-03-23 | End: 2024-03-23

## 2024-03-23 RX ORDER — AZITHROMYCIN 500 MG/1
250 TABLET, FILM COATED ORAL DAILY
Refills: 0 | Status: COMPLETED | OUTPATIENT
Start: 2024-03-23 | End: 2024-03-26

## 2024-03-23 RX ORDER — DOBUTAMINE HCL 250MG/20ML
5 VIAL (ML) INTRAVENOUS
Qty: 500 | Refills: 0 | Status: DISCONTINUED | OUTPATIENT
Start: 2024-03-23 | End: 2024-03-24

## 2024-03-23 RX ADMIN — Medication 500 MILLIGRAM(S): at 06:50

## 2024-03-23 RX ADMIN — Medication 975 MILLIGRAM(S): at 08:00

## 2024-03-23 RX ADMIN — SENNA PLUS 2 TABLET(S): 8.6 TABLET ORAL at 21:24

## 2024-03-23 RX ADMIN — HEPARIN SODIUM 5000 UNIT(S): 5000 INJECTION INTRAVENOUS; SUBCUTANEOUS at 14:33

## 2024-03-23 RX ADMIN — Medication 20 MILLIGRAM(S): at 23:13

## 2024-03-23 RX ADMIN — MUPIROCIN 1 APPLICATION(S): 20 OINTMENT TOPICAL at 07:59

## 2024-03-23 RX ADMIN — CEFTRIAXONE 100 MILLIGRAM(S): 500 INJECTION, POWDER, FOR SOLUTION INTRAMUSCULAR; INTRAVENOUS at 19:54

## 2024-03-23 RX ADMIN — OXYCODONE HYDROCHLORIDE 10 MILLIGRAM(S): 5 TABLET ORAL at 21:27

## 2024-03-23 RX ADMIN — Medication 125 MILLILITER(S): at 19:05

## 2024-03-23 RX ADMIN — Medication 250 MILLILITER(S): at 09:39

## 2024-03-23 RX ADMIN — GLYCOPYRROLATE AND FORMOTEROL FUMARATE 2 PUFF(S): 9; 4.8 AEROSOL, METERED RESPIRATORY (INHALATION) at 21:26

## 2024-03-23 RX ADMIN — Medication 81 MILLIGRAM(S): at 12:16

## 2024-03-23 RX ADMIN — POLYETHYLENE GLYCOL 3350 17 GRAM(S): 17 POWDER, FOR SOLUTION ORAL at 12:16

## 2024-03-23 RX ADMIN — Medication 12.5 MILLIGRAM(S): at 06:49

## 2024-03-23 RX ADMIN — Medication 4: at 08:10

## 2024-03-23 RX ADMIN — Medication 975 MILLIGRAM(S): at 02:00

## 2024-03-23 RX ADMIN — Medication 4: at 17:41

## 2024-03-23 RX ADMIN — PANTOPRAZOLE SODIUM 40 MILLIGRAM(S): 20 TABLET, DELAYED RELEASE ORAL at 06:49

## 2024-03-23 RX ADMIN — INSULIN GLARGINE 40 UNIT(S): 100 INJECTION, SOLUTION SUBCUTANEOUS at 22:56

## 2024-03-23 RX ADMIN — GABAPENTIN 100 MILLIGRAM(S): 400 CAPSULE ORAL at 21:24

## 2024-03-23 RX ADMIN — AMIODARONE HYDROCHLORIDE 400 MILLIGRAM(S): 400 TABLET ORAL at 06:49

## 2024-03-23 RX ADMIN — Medication 15 UNIT(S): at 12:17

## 2024-03-23 RX ADMIN — Medication 15 UNIT(S): at 08:10

## 2024-03-23 RX ADMIN — GABAPENTIN 100 MILLIGRAM(S): 400 CAPSULE ORAL at 06:49

## 2024-03-23 RX ADMIN — AZITHROMYCIN 250 MILLIGRAM(S): 500 TABLET, FILM COATED ORAL at 17:50

## 2024-03-23 RX ADMIN — HEPARIN SODIUM 5000 UNIT(S): 5000 INJECTION INTRAVENOUS; SUBCUTANEOUS at 07:59

## 2024-03-23 RX ADMIN — Medication 125 MILLILITER(S): at 10:16

## 2024-03-23 RX ADMIN — CHLORHEXIDINE GLUCONATE 15 MILLILITER(S): 213 SOLUTION TOPICAL at 07:59

## 2024-03-23 RX ADMIN — Medication 500 MILLIGRAM(S): at 17:42

## 2024-03-23 RX ADMIN — HEPARIN SODIUM 5000 UNIT(S): 5000 INJECTION INTRAVENOUS; SUBCUTANEOUS at 21:23

## 2024-03-23 RX ADMIN — Medication 2: at 22:56

## 2024-03-23 RX ADMIN — MUPIROCIN 1 APPLICATION(S): 20 OINTMENT TOPICAL at 18:01

## 2024-03-23 RX ADMIN — Medication 15 UNIT(S): at 17:42

## 2024-03-23 RX ADMIN — Medication 4: at 12:16

## 2024-03-23 RX ADMIN — Medication 975 MILLIGRAM(S): at 01:06

## 2024-03-23 RX ADMIN — Medication 8.39 MICROGRAM(S)/KG/MIN: at 17:42

## 2024-03-23 RX ADMIN — OXYCODONE HYDROCHLORIDE 10 MILLIGRAM(S): 5 TABLET ORAL at 20:27

## 2024-03-23 RX ADMIN — Medication 975 MILLIGRAM(S): at 06:49

## 2024-03-23 RX ADMIN — GABAPENTIN 100 MILLIGRAM(S): 400 CAPSULE ORAL at 14:34

## 2024-03-23 NOTE — PROCEDURE NOTE - NSICDXPROBLEMMLMBOX_GEN
IPSTART~^~1~^~[id number]~^~~^~IPEND  PKSTART~^~[id number]~^~PKEND  IPSTART~^~2~^~[id number]~^~~^~IPEND  PKSTART~^~[id number]~^~PKEND  
IPSTART~^~1~^~[id number]~^~~^~IPEND  PKSTART~^~[id number]~^~PKEND

## 2024-03-23 NOTE — PROGRESS NOTE ADULT - SUBJECTIVE AND OBJECTIVE BOX
INTERVAL HPI/OVERNIGHT EVENTS:    3/19:  Bio Bental  EF normal/hyperdynamic    72yo Female Hx DM, severe AS, HTN, HLD, anemia presenting to [facility] with syncope.    CT Head - negative  ECHO: severe AS. Transferred to [facility] for intervention - possible TAVR vs BAV.     not candidate for perc intervention due to anatomy    3/19 - OR - intraop: 2.8 L LR/4 U pRBC/5 cryo/2 FFP/1 platelet  3/20 - dobutamine started (elevated LA) with improvement ; primacor added and Extubated    3/21 - Fib noted -  titrated off; milrinone 0.25  amio started and milrinone titrated down     3/22: ongoing pAFib - amio and metoprolol -   3/23 - bradycardic - drop in UO and rising LA/LFT - concern for low output state  pacing inc (inc HR) and amio/metoprolol held -  trent placed  ECHO obtained - contacted by ECHO reporting RV down     Inotropic support started     ICU Vital Signs Last 24 Hrs  T(C): 36.3 (23 Mar 2024 12:08), Max: 36.6 (22 Mar 2024 14:15)  T(F): 97.4 (23 Mar 2024 12:08), Max: 97.9 (23 Mar 2024 05:31)  HR: 72 (23 Mar 2024 11:00) (49 - 82) paced   BP: 114/63 (23 Mar 2024 11:00) (70/46 - 134/64)  BP(mean): 80 (23 Mar 2024 11:00) (54 - 88)  ABP: 123/53 (22 Mar 2024 18:00) (119/49 - 134/56)  ABP(mean): 74 (22 Mar 2024 18:00) (71 - 79)  RR: 18 (23 Mar 2024 11:00) (15 - 20)  SpO2: 97% (23 Mar 2024 11:00) (92% - 100%) 3L NC    Qtts: None    I&O's Summary    22 Mar 2024 07:01  -  23 Mar 2024 07:00  --------------------------------------------------------  IN: 1080.7 mL / OUT: 80 mL / NET: 1000.7 mL    23 Mar 2024 07:01  -  23 Mar 2024 12:19  --------------------------------------------------------  IN: 710 mL / OUT: 220 mL / NET: 490 mL    Physical Exam    Heart - regular (-)rub/gallop  Lungs - dec BS base - no rhonchi/wheeze  Abd - (+)BS soft NTND (-)r/r/g  Ext - warm to touch; no cyanosis/clubbing  Chest - op bandage in place  Neuro - alert/oriented and interactive - nonfocal   Skin - no rash     LABS:                        9.2    16.91 )-----------( 346      ( 23 Mar 2024 09:21 )             29.7     03-23    141  |  106  |  24<H>  ----------------------------<  330<H>  4.9   |  22  |  1.16    Ca    9.8      23 Mar 2024 09:21  Phos  4.6     03-23  Mg     2.5     23    TPro  6.0  /  Alb  3.7  /  TBili  0.6  /  DBili  x   /  AST  193<H>  /  ALT  138<H>  /  AlkPhos  73  03-23    PT/INR - ( 23 Mar 2024 09:21 )   PT: 12.5 sec;   INR: 1.10     PTT - ( 23 Mar 2024 09:21 )  PTT:29.1 sec    ABG - ( 22 Mar 2024 11:12 )  pH, Arterial: 7.44  pH, Blood: x     /  pCO2: 33    /  pO2: 133   / HCO3: 22    / Base Excess: -1.1  /  SaO2: 99.2                RADIOLOGY & ADDITIONAL STUDIES:    I have spent/provided stated minutes of critical care time to this patient:  INTERVAL HPI/OVERNIGHT EVENTS:    3/19:  Bio Bental  EF normal/hyperdynamic    74yo Female Hx DM, severe AS, HTN, HLD, anemia presenting to [facility] with syncope.    CT Head - negative  ECHO: severe AS. Transferred to [facility] for intervention - possible TAVR vs BAV.     not candidate for perc intervention due to anatomy    3/19 - OR - intraop: 2.8 L LR/4 U pRBC/5 cryo/2 FFP/1 platelet  3/20 - dobutamine started (elevated LA) with improvement ; primacor added and Extubated    3/21 - Fib noted -  titrated off; milrinone 0.25  amio started and milrinone titrated down ; Abx started for tracheobronchitis    3/22: ongoing pAFib - amio and metoprolol -   3/23 - bradycardic - drop in UO and rising LA/LFT - concern for low output state  pacing inc (inc HR) and amio/metoprolol held -  trent placed  ECHO obtained - contacted by ECHO reporting RV down     Inotropic support started     ICU Vital Signs Last 24 Hrs  T(C): 36.3 (23 Mar 2024 12:08), Max: 36.6 (22 Mar 2024 14:15)  T(F): 97.4 (23 Mar 2024 12:08), Max: 97.9 (23 Mar 2024 05:31)  HR: 72 (23 Mar 2024 11:00) (49 - 82) paced   BP: 114/63 (23 Mar 2024 11:00) (70/46 - 134/64)  BP(mean): 80 (23 Mar 2024 11:00) (54 - 88)  ABP: 123/53 (22 Mar 2024 18:00) (119/49 - 134/56)  ABP(mean): 74 (22 Mar 2024 18:00) (71 - 79)  RR: 18 (23 Mar 2024 11:00) (15 - 20)  SpO2: 97% (23 Mar 2024 11:00) (92% - 100%) 3L NC    Qtts: None    I&O's Summary    22 Mar 2024 07:01  -  23 Mar 2024 07:00  --------------------------------------------------------  IN: 1080.7 mL / OUT: 80 mL / NET: 1000.7 mL    23 Mar 2024 07:01  -  23 Mar 2024 12:19  --------------------------------------------------------  IN: 710 mL / OUT: 220 mL / NET: 490 mL    Physical Exam    Heart - regular (-)rub/gallop  Lungs - dec BS base - no rhonchi/wheeze  Abd - (+)BS soft NTND (-)r/r/g  Ext - warm to touch; no cyanosis/clubbing  Chest - op bandage in place  Neuro - alert/oriented and interactive - nonfocal   Skin - no rash     LABS:                        9.2    16.91 )-----------( 346      ( 23 Mar 2024 09:21 )             29.7     03-23    141  |  106  |  24<H>  ----------------------------<  330<H>  4.9   |  22  |  1.16    Ca    9.8      23 Mar 2024 09:21  Phos  4.6     03-23  Mg     2.5     -23    TPro  6.0  /  Alb  3.7  /  TBili  0.6  /  DBili  x   /  AST  193<H>  /  ALT  138<H>  /  AlkPhos  73  03-23    PT/INR - ( 23 Mar 2024 09:21 )   PT: 12.5 sec;   INR: 1.10     PTT - ( 23 Mar 2024 09:21 )  PTT:29.1 sec    ABG - ( 22 Mar 2024 11:12 )  pH, Arterial: 7.44  pH, Blood: x     /  pCO2: 33    /  pO2: 133   / HCO3: 22    / Base Excess: -1.1  /  SaO2: 99.2      RADIOLOGY & ADDITIONAL STUDIES:      Patient with severe symptomatic aortic stenosis s/p Bio Bental - now POD#2 - post-op cardiogenic shock improving - slowly titrated off inotroic support - placed on Amio and metoprolol for atrial fib - noted relative hypotension this am SBP- 80-90. with concern of hypoperfusion - inc LA/LFT and no UO     1. CV  Amio and BB held - pacer inc HR to inc CO  Stat ECHO obtained - Compared to the previous TTE performed on 3/11/2024, patient is s/p   AVR and right ventricle appears mildly reduced.  restart inotropic -  5 - SBP now 108  pacing 70  hold metoprolol and assess ability to cont amio over time   ASA    2. Pulm   incentive spirometry/ambulation with staff assist  titrate supplemental oxygen down to off - on 2L   Abx started for tracheobronchitis - transition to zpak; short course CTX now #3  Sputum 3/21 - normal olayinka     3. Endocrine  poor glycemic control   endocrine following   may need to restart infusion to obtain control then transition back to maintain control    (being started) contains dextose as well     d/w patient/staff and CTS       I have spent/provided stated minutes of critical care time to this patient: 60

## 2024-03-23 NOTE — PROGRESS NOTE ADULT - SUBJECTIVE AND OBJECTIVE BOX
CTICU  CRITICAL  CARE  attending     Hand off received 					   Pertinent clinical, laboratory, radiographic, hemodynamic, echocardiographic, respiratory data, microbiologic data and chart were reviewed and analyzed frequently throughout the course of the day and night  Patient seen and examined with CTS/ SH attending at bedside    Pt is a 73y , Female, pod # 4 s/p Bio carlo;     post op:    uneventful recovery  transferred to floor care    today:    features of low flow state  re-admitted to ICU  Dobutamine for inotrope support  2 L volume resuscitation ( 1L crystalloid/1L colloid)  lactate trending deisy;  LFTs downtrending    72 y/o female PMH DM, severe AS, EF 60%, HTN, HLD, anemia who was admitted to [facility] with syncope. CT negative for CVA. subsequent echo showed severe AS. Transferred to [facility] under Dr. Moon for evaluation for TAVR vs BAV.    Aortic stenosis, severe    HTN (hypertension)    Hyperlipidemia    Type 2 diabetes mellitus    Right ventricular systolic dysfunction without heart failure    Bilateral pleural effusion        , FAMILY HISTORY:  No pertinent family history in first degree relatives    PAST MEDICAL & SURGICAL HISTORY:  Aortic stenosis      HTN (hypertension)      DM (diabetes mellitus)      Hyperlipidemia      Anemia      No significant past surgical history        Patient is a 73y old  Female who presents with a chief complaint of severe AS (23 Mar 2024 12:18)      14 system review limited 2/2 post op morbidity  Vital signs, hemodynamic and respiratory parameters were reviewed from the bedside nursing flowsheet.  ICU Vital Signs Last 24 Hrs  T(C): 36.4 (23 Mar 2024 17:21), Max: 36.6 (23 Mar 2024 05:31)  T(F): 97.5 (23 Mar 2024 17:21), Max: 97.9 (23 Mar 2024 05:31)  HR: 90 (24 Mar 2024 02:00) (49 - 92)  BP: 132/56 (24 Mar 2024 01:00) (83/47 - 155/65)  BP(mean): 81 (24 Mar 2024 01:00) (57 - 93)  ABP: --  ABP(mean): --  RR: 18 (24 Mar 2024 02:00) (16 - 20)  SpO2: 95% (24 Mar 2024 02:00) (92% - 100%)    O2 Parameters below as of 24 Mar 2024 02:00  Patient On (Oxygen Delivery Method): nasal cannula w/ humidification  O2 Flow (L/min): 2        Adult Advanced Hemodynamics Last 24 Hrs  CVP(mm Hg): 4 (24 Mar 2024 01:00) (3 - 306)  CVP(cm H2O): --  CO: --  CI: --  PA: --  PA(mean): --  PCWP: --  SVR: --  SVRI: --  PVR: --  PVRI: --, ABG - ( 22 Mar 2024 11:12 )  pH, Arterial: 7.44  pH, Blood: x     /  pCO2: 33    /  pO2: 133   / HCO3: 22    / Base Excess: -1.1  /  SaO2: 99.2                Intake and output was reviewed and the fluid balance was calculated  Daily     Daily   I&O's Summary    22 Mar 2024 07:01  -  23 Mar 2024 07:00  --------------------------------------------------------  IN: 1080.7 mL / OUT: 80 mL / NET: 1000.7 mL    23 Mar 2024 07:01  -  24 Mar 2024 02:13  --------------------------------------------------------  IN: 1227.6 mL / OUT: 1605 mL / NET: -377.4 mL        All lines and drain sites were assessed  [facility]      POCT Blood Glucose.: 161 mg/dL (23 Mar 2024 22:45)    No acute change in mental status  Auscultation of the chest reveals equal bs  Abdomen is soft  Extremities are warm and well perfused  Wounds appear clean and unremarkable  Antibiotics are periop    labs  CBC Full  -  ( 23 Mar 2024 22:17 )  WBC Count : 10.33 K/uL  RBC Count : 3.26 M/uL  Hemoglobin : 9.0 g/dL  Hematocrit : 28.2 %  Platelet Count - Automated : 328 K/uL  Mean Cell Volume : 86.5 fl  Mean Cell Hemoglobin : 27.6 pg  Mean Cell Hemoglobin Concentration : 31.9 gm/dL  Auto Neutrophil # : 8.58 K/uL  Auto Lymphocyte # : 0.72 K/uL  Auto Monocyte # : 0.92 K/uL  Auto Eosinophil # : 0.02 K/uL  Auto Basophil # : 0.02 K/uL  Auto Neutrophil % : 83.0 %  Auto Lymphocyte % : 7.0 %  Auto Monocyte % : 8.9 %  Auto Eosinophil % : 0.2 %  Auto Basophil % : 0.2 %    03-23    140  |  106  |  25<H>  ----------------------------<  152<H>  4.2   |  23  |  0.96    Ca    9.6      23 Mar 2024 22:17  Phos  3.4     03-23  Mg     2.2     03-23    TPro  5.8<L>  /  Alb  3.9  /  TBili  0.7  /  DBili  x   /  AST  132<H>  /  ALT  147<H>  /  AlkPhos  72  03-23    PT/INR - ( 23 Mar 2024 22:17 )   PT: 13.3 sec;   INR: 1.17          PTT - ( 23 Mar 2024 16:42 )  PTT:30.7 sec  The current medications were reviewed   MEDICATIONS  (STANDING):  ascorbic acid 500 milliGRAM(s) Oral two times a day  aspirin enteric coated 81 milliGRAM(s) Oral daily  azithromycin   Tablet 250 milliGRAM(s) Oral daily  cefTRIAXone   IVPB      cefTRIAXone   IVPB 1000 milliGRAM(s) IV Intermittent every 24 hours  chlorhexidine 0.12% Liquid 15 milliLiter(s) Oral Mucosa every 12 hours  chlorhexidine 2% Cloths 1 Application(s) Topical daily  dextrose 5%. 1000 milliLiter(s) (50 mL/Hr) IV Continuous <Continuous>  dextrose 5%. 1000 milliLiter(s) (100 mL/Hr) IV Continuous <Continuous>  dextrose 50% Injectable 25 Gram(s) IV Push once  dextrose 50% Injectable 50 milliLiter(s) IV Push every 15 minutes  dextrose 50% Injectable 25 milliLiter(s) IV Push every 15 minutes  DOBUTamine Infusion 5 MICROgram(s)/kG/Min (8.39 mL/Hr) IV Continuous <Continuous>  gabapentin 100 milliGRAM(s) Oral three times a day  glucagon  Injectable 1 milliGRAM(s) IntraMuscular once  glycopyrrolate 9 MICROgram(s)/formoterol 4.8 MICROgram(s) Inhaler 2 Puff(s) Inhalation two times a day  heparin   Injectable 5000 Unit(s) SubCutaneous every 8 hours  insulin glargine Injectable (LANTUS) 40 Unit(s) SubCutaneous at bedtime  insulin lispro (ADMELOG) corrective regimen sliding scale   SubCutaneous Before meals and at bedtime  insulin lispro Injectable (ADMELOG) 15 Unit(s) SubCutaneous three times a day before meals  mupirocin 2% Ointment 1 Application(s) Both Nostrils two times a day  pantoprazole    Tablet 40 milliGRAM(s) Oral before breakfast  polyethylene glycol 3350 17 Gram(s) Oral daily  senna 2 Tablet(s) Oral at bedtime  sodium chloride 0.9%. 1000 milliLiter(s) (10 mL/Hr) IV Continuous <Continuous>    MEDICATIONS  (PRN):  dextrose Oral Gel 15 Gram(s) Oral once PRN Blood Glucose LESS THAN 70 milliGRAM(s)/deciliter  oxyCODONE    IR 10 milliGRAM(s) Oral every 6 hours PRN Severe Pain (7 - 10)  oxyCODONE    IR 5 milliGRAM(s) Oral every 6 hours PRN Moderate Pain (4 - 6)       PROBLEM LIST/ ASSESSMENT:  HEALTH ISSUES - PROBLEM Dx:  Aortic stenosis, severe    HTN (hypertension)    Hyperlipidemia    Type 2 diabetes mellitus    Right ventricular systolic dysfunction without heart failure    Bilateral pleural effusion        ,   Patient is a 73y old  Female who presents with a chief complaint of severe AS (23 Mar 2024 12:18)     s/p Bio bentall      My plan includes :    Continue inotrope support with dobutamine  Trend lactate levels  titrate inotrope support to maintain normal lactate/improvement in transaminases  Supplemental O2  Titrate Fio2 to maintain Sao2 >95%  Strict blood sugar control    close hemodynamic, ventilatory and drain monitoring and management per post op routine    Monitor for arrhythmias and monitor parameters for organ perfusion  monitor neurologic status  Head of the bed should remain elevated to 45 deg .   chest PT and IS will be encouraged  monitor adequacy of oxygenation and ventilation and attempt to wean oxygen  Nutritional goals will be met using po eventually , ensure adequate caloric intake and montior the same  Stress ulcer and VTE prophylaxis will be achieved    Glycemic control is satisfactory  Electrolytes have been repleted as necessary and wound care has been carried out. Pain control has been achieved.   agressive physical therapy and early mobility and ambulation goals will be met   The family was updated about the course and plan  CRITICAL CARE TIME SPENT in evaluation and management, reassessments, review and interpretation of labs and x-rays, ventilator and hemodynamic management, formulating a plan and coordinating care: ___30___ MIN.  Time does not include procedural time.  CTICU ATTENDING     					    Saravanan Wilson MD

## 2024-03-24 LAB
ALBUMIN SERPL ELPH-MCNC: 3.8 G/DL — SIGNIFICANT CHANGE UP (ref 3.3–5)
ALBUMIN SERPL ELPH-MCNC: 3.8 G/DL — SIGNIFICANT CHANGE UP (ref 3.3–5)
ALBUMIN SERPL ELPH-MCNC: 3.9 G/DL — SIGNIFICANT CHANGE UP (ref 3.3–5)
ALP SERPL-CCNC: 170 U/L — HIGH (ref 40–120)
ALP SERPL-CCNC: 71 U/L — SIGNIFICANT CHANGE UP (ref 40–120)
ALP SERPL-CCNC: 78 U/L — SIGNIFICANT CHANGE UP (ref 40–120)
ALT FLD-CCNC: 124 U/L — HIGH (ref 10–45)
ALT FLD-CCNC: 146 U/L — HIGH (ref 10–45)
ALT FLD-CCNC: 191 U/L — HIGH (ref 10–45)
ANION GAP SERPL CALC-SCNC: 10 MMOL/L — SIGNIFICANT CHANGE UP (ref 5–17)
ANION GAP SERPL CALC-SCNC: 12 MMOL/L — SIGNIFICANT CHANGE UP (ref 5–17)
ANION GAP SERPL CALC-SCNC: 9 MMOL/L — SIGNIFICANT CHANGE UP (ref 5–17)
APTT BLD: 31.8 SEC — SIGNIFICANT CHANGE UP (ref 24.5–35.6)
AST SERPL-CCNC: 161 U/L — HIGH (ref 10–40)
AST SERPL-CCNC: 79 U/L — HIGH (ref 10–40)
AST SERPL-CCNC: 97 U/L — HIGH (ref 10–40)
BASE EXCESS BLDV CALC-SCNC: 1.8 MMOL/L — SIGNIFICANT CHANGE UP (ref -2–3)
BASE EXCESS BLDV CALC-SCNC: 3 MMOL/L — SIGNIFICANT CHANGE UP (ref -2–3)
BASOPHILS # BLD AUTO: 0.02 K/UL — SIGNIFICANT CHANGE UP (ref 0–0.2)
BASOPHILS # BLD AUTO: 0.02 K/UL — SIGNIFICANT CHANGE UP (ref 0–0.2)
BASOPHILS # BLD AUTO: 0.03 K/UL — SIGNIFICANT CHANGE UP (ref 0–0.2)
BASOPHILS NFR BLD AUTO: 0.2 % — SIGNIFICANT CHANGE UP (ref 0–2)
BILIRUB SERPL-MCNC: 0.6 MG/DL — SIGNIFICANT CHANGE UP (ref 0.2–1.2)
BILIRUB SERPL-MCNC: 0.7 MG/DL — SIGNIFICANT CHANGE UP (ref 0.2–1.2)
BILIRUB SERPL-MCNC: 0.7 MG/DL — SIGNIFICANT CHANGE UP (ref 0.2–1.2)
BUN SERPL-MCNC: 22 MG/DL — SIGNIFICANT CHANGE UP (ref 7–23)
BUN SERPL-MCNC: 22 MG/DL — SIGNIFICANT CHANGE UP (ref 7–23)
BUN SERPL-MCNC: 24 MG/DL — HIGH (ref 7–23)
CA-I SERPL-SCNC: 1.29 MMOL/L — SIGNIFICANT CHANGE UP (ref 1.15–1.33)
CALCIUM SERPL-MCNC: 9.5 MG/DL — SIGNIFICANT CHANGE UP (ref 8.4–10.5)
CALCIUM SERPL-MCNC: 9.8 MG/DL — SIGNIFICANT CHANGE UP (ref 8.4–10.5)
CALCIUM SERPL-MCNC: 9.9 MG/DL — SIGNIFICANT CHANGE UP (ref 8.4–10.5)
CHLORIDE SERPL-SCNC: 105 MMOL/L — SIGNIFICANT CHANGE UP (ref 96–108)
CHLORIDE SERPL-SCNC: 106 MMOL/L — SIGNIFICANT CHANGE UP (ref 96–108)
CHLORIDE SERPL-SCNC: 107 MMOL/L — SIGNIFICANT CHANGE UP (ref 96–108)
CO2 BLDV-SCNC: 26.8 MMOL/L — HIGH (ref 22–26)
CO2 BLDV-SCNC: 28.3 MMOL/L — HIGH (ref 22–26)
CO2 SERPL-SCNC: 24 MMOL/L — SIGNIFICANT CHANGE UP (ref 22–31)
CO2 SERPL-SCNC: 25 MMOL/L — SIGNIFICANT CHANGE UP (ref 22–31)
CO2 SERPL-SCNC: 25 MMOL/L — SIGNIFICANT CHANGE UP (ref 22–31)
CREAT SERPL-MCNC: 0.84 MG/DL — SIGNIFICANT CHANGE UP (ref 0.5–1.3)
CREAT SERPL-MCNC: 0.84 MG/DL — SIGNIFICANT CHANGE UP (ref 0.5–1.3)
CREAT SERPL-MCNC: 0.95 MG/DL — SIGNIFICANT CHANGE UP (ref 0.5–1.3)
EGFR: 64 ML/MIN/1.73M2 — SIGNIFICANT CHANGE UP
EGFR: 74 ML/MIN/1.73M2 — SIGNIFICANT CHANGE UP
EGFR: 74 ML/MIN/1.73M2 — SIGNIFICANT CHANGE UP
EOSINOPHIL # BLD AUTO: 0.01 K/UL — SIGNIFICANT CHANGE UP (ref 0–0.5)
EOSINOPHIL # BLD AUTO: 0.02 K/UL — SIGNIFICANT CHANGE UP (ref 0–0.5)
EOSINOPHIL # BLD AUTO: 0.03 K/UL — SIGNIFICANT CHANGE UP (ref 0–0.5)
EOSINOPHIL NFR BLD AUTO: 0.1 % — SIGNIFICANT CHANGE UP (ref 0–6)
EOSINOPHIL NFR BLD AUTO: 0.2 % — SIGNIFICANT CHANGE UP (ref 0–6)
EOSINOPHIL NFR BLD AUTO: 0.2 % — SIGNIFICANT CHANGE UP (ref 0–6)
GAS PNL BLDV: 138 MMOL/L — SIGNIFICANT CHANGE UP (ref 136–145)
GAS PNL BLDV: SIGNIFICANT CHANGE UP
GLUCOSE BLDC GLUCOMTR-MCNC: 100 MG/DL — HIGH (ref 70–99)
GLUCOSE BLDC GLUCOMTR-MCNC: 106 MG/DL — HIGH (ref 70–99)
GLUCOSE BLDC GLUCOMTR-MCNC: 223 MG/DL — HIGH (ref 70–99)
GLUCOSE BLDC GLUCOMTR-MCNC: 252 MG/DL — HIGH (ref 70–99)
GLUCOSE SERPL-MCNC: 245 MG/DL — HIGH (ref 70–99)
GLUCOSE SERPL-MCNC: 91 MG/DL — SIGNIFICANT CHANGE UP (ref 70–99)
GLUCOSE SERPL-MCNC: 96 MG/DL — SIGNIFICANT CHANGE UP (ref 70–99)
HCO3 BLDV-SCNC: 26 MMOL/L — SIGNIFICANT CHANGE UP (ref 22–29)
HCO3 BLDV-SCNC: 27 MMOL/L — SIGNIFICANT CHANGE UP (ref 22–29)
HCT VFR BLD CALC: 28.2 % — LOW (ref 34.5–45)
HCT VFR BLD CALC: 28.5 % — LOW (ref 34.5–45)
HCT VFR BLD CALC: 28.9 % — LOW (ref 34.5–45)
HGB BLD-MCNC: 9 G/DL — LOW (ref 11.5–15.5)
HGB BLD-MCNC: 9 G/DL — LOW (ref 11.5–15.5)
HGB BLD-MCNC: 9.2 G/DL — LOW (ref 11.5–15.5)
IMM GRANULOCYTES NFR BLD AUTO: 0.4 % — SIGNIFICANT CHANGE UP (ref 0–0.9)
IMM GRANULOCYTES NFR BLD AUTO: 0.6 % — SIGNIFICANT CHANGE UP (ref 0–0.9)
IMM GRANULOCYTES NFR BLD AUTO: 0.7 % — SIGNIFICANT CHANGE UP (ref 0–0.9)
INR BLD: 1.18 — SIGNIFICANT CHANGE UP (ref 0.85–1.18)
INR BLD: 1.25 — HIGH (ref 0.85–1.18)
INR BLD: 1.31 — HIGH (ref 0.85–1.18)
LACTATE SERPL-SCNC: 0.9 MMOL/L — SIGNIFICANT CHANGE UP (ref 0.5–2)
LACTATE SERPL-SCNC: 0.9 MMOL/L — SIGNIFICANT CHANGE UP (ref 0.5–2)
LACTATE SERPL-SCNC: 1.2 MMOL/L — SIGNIFICANT CHANGE UP (ref 0.5–2)
LYMPHOCYTES # BLD AUTO: 0.73 K/UL — LOW (ref 1–3.3)
LYMPHOCYTES # BLD AUTO: 0.82 K/UL — LOW (ref 1–3.3)
LYMPHOCYTES # BLD AUTO: 0.93 K/UL — LOW (ref 1–3.3)
LYMPHOCYTES # BLD AUTO: 5 % — LOW (ref 13–44)
LYMPHOCYTES # BLD AUTO: 7.2 % — LOW (ref 13–44)
LYMPHOCYTES # BLD AUTO: 7.3 % — LOW (ref 13–44)
MAGNESIUM SERPL-MCNC: 2.2 MG/DL — SIGNIFICANT CHANGE UP (ref 1.6–2.6)
MAGNESIUM SERPL-MCNC: 2.2 MG/DL — SIGNIFICANT CHANGE UP (ref 1.6–2.6)
MAGNESIUM SERPL-MCNC: 2.3 MG/DL — SIGNIFICANT CHANGE UP (ref 1.6–2.6)
MCHC RBC-ENTMCNC: 27.1 PG — SIGNIFICANT CHANGE UP (ref 27–34)
MCHC RBC-ENTMCNC: 27.2 PG — SIGNIFICANT CHANGE UP (ref 27–34)
MCHC RBC-ENTMCNC: 27.4 PG — SIGNIFICANT CHANGE UP (ref 27–34)
MCHC RBC-ENTMCNC: 31.6 GM/DL — LOW (ref 32–36)
MCHC RBC-ENTMCNC: 31.8 GM/DL — LOW (ref 32–36)
MCHC RBC-ENTMCNC: 31.9 GM/DL — LOW (ref 32–36)
MCV RBC AUTO: 85.2 FL — SIGNIFICANT CHANGE UP (ref 80–100)
MCV RBC AUTO: 85.8 FL — SIGNIFICANT CHANGE UP (ref 80–100)
MCV RBC AUTO: 86 FL — SIGNIFICANT CHANGE UP (ref 80–100)
MONOCYTES # BLD AUTO: 1.21 K/UL — HIGH (ref 0–0.9)
MONOCYTES # BLD AUTO: 1.25 K/UL — HIGH (ref 0–0.9)
MONOCYTES # BLD AUTO: 1.29 K/UL — HIGH (ref 0–0.9)
MONOCYTES NFR BLD AUTO: 11.3 % — SIGNIFICANT CHANGE UP (ref 2–14)
MONOCYTES NFR BLD AUTO: 8.2 % — SIGNIFICANT CHANGE UP (ref 2–14)
MONOCYTES NFR BLD AUTO: 9.8 % — SIGNIFICANT CHANGE UP (ref 2–14)
NEUTROPHILS # BLD AUTO: 10.42 K/UL — HIGH (ref 1.8–7.4)
NEUTROPHILS # BLD AUTO: 12.63 K/UL — HIGH (ref 1.8–7.4)
NEUTROPHILS # BLD AUTO: 9.25 K/UL — HIGH (ref 1.8–7.4)
NEUTROPHILS NFR BLD AUTO: 80.7 % — HIGH (ref 43–77)
NEUTROPHILS NFR BLD AUTO: 81.9 % — HIGH (ref 43–77)
NEUTROPHILS NFR BLD AUTO: 85.8 % — HIGH (ref 43–77)
NRBC # BLD: 0 /100 WBCS — SIGNIFICANT CHANGE UP (ref 0–0)
PCO2 BLDV: 37 MMHG — LOW (ref 39–42)
PCO2 BLDV: 39 MMHG — SIGNIFICANT CHANGE UP (ref 39–42)
PH BLDV: 7.45 — HIGH (ref 7.32–7.43)
PH BLDV: 7.45 — HIGH (ref 7.32–7.43)
PHOSPHATE SERPL-MCNC: 3.7 MG/DL — SIGNIFICANT CHANGE UP (ref 2.5–4.5)
PHOSPHATE SERPL-MCNC: 3.9 MG/DL — SIGNIFICANT CHANGE UP (ref 2.5–4.5)
PHOSPHATE SERPL-MCNC: 4 MG/DL — SIGNIFICANT CHANGE UP (ref 2.5–4.5)
PLATELET # BLD AUTO: 351 K/UL — SIGNIFICANT CHANGE UP (ref 150–400)
PLATELET # BLD AUTO: 351 K/UL — SIGNIFICANT CHANGE UP (ref 150–400)
PLATELET # BLD AUTO: 383 K/UL — SIGNIFICANT CHANGE UP (ref 150–400)
PO2 BLDV: 40 MMHG — SIGNIFICANT CHANGE UP (ref 25–45)
PO2 BLDV: 42 MMHG — SIGNIFICANT CHANGE UP (ref 25–45)
POTASSIUM BLDV-SCNC: 3.7 MMOL/L — SIGNIFICANT CHANGE UP (ref 3.5–5.1)
POTASSIUM SERPL-MCNC: 3.8 MMOL/L — SIGNIFICANT CHANGE UP (ref 3.5–5.3)
POTASSIUM SERPL-MCNC: 4.2 MMOL/L — SIGNIFICANT CHANGE UP (ref 3.5–5.3)
POTASSIUM SERPL-MCNC: 4.8 MMOL/L — SIGNIFICANT CHANGE UP (ref 3.5–5.3)
POTASSIUM SERPL-SCNC: 3.8 MMOL/L — SIGNIFICANT CHANGE UP (ref 3.5–5.3)
POTASSIUM SERPL-SCNC: 4.2 MMOL/L — SIGNIFICANT CHANGE UP (ref 3.5–5.3)
POTASSIUM SERPL-SCNC: 4.8 MMOL/L — SIGNIFICANT CHANGE UP (ref 3.5–5.3)
PROT SERPL-MCNC: 5.9 G/DL — LOW (ref 6–8.3)
PROT SERPL-MCNC: 6 G/DL — SIGNIFICANT CHANGE UP (ref 6–8.3)
PROT SERPL-MCNC: 6 G/DL — SIGNIFICANT CHANGE UP (ref 6–8.3)
PROTHROM AB SERPL-ACNC: 13.4 SEC — HIGH (ref 9.5–13)
PROTHROM AB SERPL-ACNC: 14.2 SEC — HIGH (ref 9.5–13)
PROTHROM AB SERPL-ACNC: 14.8 SEC — HIGH (ref 9.5–13)
RBC # BLD: 3.31 M/UL — LOW (ref 3.8–5.2)
RBC # BLD: 3.32 M/UL — LOW (ref 3.8–5.2)
RBC # BLD: 3.36 M/UL — LOW (ref 3.8–5.2)
RBC # FLD: 16.1 % — HIGH (ref 10.3–14.5)
RBC # FLD: 16.4 % — HIGH (ref 10.3–14.5)
RBC # FLD: 16.5 % — HIGH (ref 10.3–14.5)
SAO2 % BLDV: 68.2 % — SIGNIFICANT CHANGE UP (ref 67–88)
SAO2 % BLDV: 69.1 % — SIGNIFICANT CHANGE UP (ref 67–88)
SODIUM SERPL-SCNC: 139 MMOL/L — SIGNIFICANT CHANGE UP (ref 135–145)
SODIUM SERPL-SCNC: 142 MMOL/L — SIGNIFICANT CHANGE UP (ref 135–145)
SODIUM SERPL-SCNC: 142 MMOL/L — SIGNIFICANT CHANGE UP (ref 135–145)
WBC # BLD: 11.45 K/UL — HIGH (ref 3.8–10.5)
WBC # BLD: 12.72 K/UL — HIGH (ref 3.8–10.5)
WBC # BLD: 14.72 K/UL — HIGH (ref 3.8–10.5)
WBC # FLD AUTO: 11.45 K/UL — HIGH (ref 3.8–10.5)
WBC # FLD AUTO: 12.72 K/UL — HIGH (ref 3.8–10.5)
WBC # FLD AUTO: 14.72 K/UL — HIGH (ref 3.8–10.5)

## 2024-03-24 PROCEDURE — 99291 CRITICAL CARE FIRST HOUR: CPT

## 2024-03-24 PROCEDURE — 32551 INSERTION OF CHEST TUBE: CPT

## 2024-03-24 PROCEDURE — 71045 X-RAY EXAM CHEST 1 VIEW: CPT | Mod: 26

## 2024-03-24 PROCEDURE — 76604 US EXAM CHEST: CPT | Mod: 26

## 2024-03-24 RX ORDER — DOBUTAMINE HCL 250MG/20ML
1 VIAL (ML) INTRAVENOUS
Qty: 500 | Refills: 0 | Status: DISCONTINUED | OUTPATIENT
Start: 2024-03-24 | End: 2024-03-26

## 2024-03-24 RX ORDER — AMIODARONE HYDROCHLORIDE 400 MG/1
150 TABLET ORAL ONCE
Refills: 0 | Status: COMPLETED | OUTPATIENT
Start: 2024-03-24 | End: 2024-03-24

## 2024-03-24 RX ORDER — POTASSIUM CHLORIDE 20 MEQ
20 PACKET (EA) ORAL ONCE
Refills: 0 | Status: COMPLETED | OUTPATIENT
Start: 2024-03-24 | End: 2024-03-24

## 2024-03-24 RX ORDER — FUROSEMIDE 40 MG
20 TABLET ORAL ONCE
Refills: 0 | Status: COMPLETED | OUTPATIENT
Start: 2024-03-24 | End: 2024-03-24

## 2024-03-24 RX ORDER — POTASSIUM CHLORIDE 20 MEQ
40 PACKET (EA) ORAL ONCE
Refills: 0 | Status: COMPLETED | OUTPATIENT
Start: 2024-03-24 | End: 2024-03-24

## 2024-03-24 RX ORDER — AMIODARONE HYDROCHLORIDE 400 MG/1
200 TABLET ORAL DAILY
Refills: 0 | Status: DISCONTINUED | OUTPATIENT
Start: 2024-03-24 | End: 2024-03-28

## 2024-03-24 RX ORDER — METOPROLOL TARTRATE 50 MG
2.5 TABLET ORAL
Refills: 0 | Status: COMPLETED | OUTPATIENT
Start: 2024-03-24 | End: 2024-03-24

## 2024-03-24 RX ADMIN — AMIODARONE HYDROCHLORIDE 600 MILLIGRAM(S): 400 TABLET ORAL at 23:01

## 2024-03-24 RX ADMIN — CHLORHEXIDINE GLUCONATE 1 APPLICATION(S): 213 SOLUTION TOPICAL at 18:22

## 2024-03-24 RX ADMIN — Medication 100 MILLIEQUIVALENT(S): at 06:33

## 2024-03-24 RX ADMIN — GABAPENTIN 100 MILLIGRAM(S): 400 CAPSULE ORAL at 05:53

## 2024-03-24 RX ADMIN — Medication 15 UNIT(S): at 08:01

## 2024-03-24 RX ADMIN — CHLORHEXIDINE GLUCONATE 15 MILLILITER(S): 213 SOLUTION TOPICAL at 05:54

## 2024-03-24 RX ADMIN — HEPARIN SODIUM 5000 UNIT(S): 5000 INJECTION INTRAVENOUS; SUBCUTANEOUS at 14:18

## 2024-03-24 RX ADMIN — GABAPENTIN 100 MILLIGRAM(S): 400 CAPSULE ORAL at 14:18

## 2024-03-24 RX ADMIN — POLYETHYLENE GLYCOL 3350 17 GRAM(S): 17 POWDER, FOR SOLUTION ORAL at 11:13

## 2024-03-24 RX ADMIN — Medication 500 MILLIGRAM(S): at 05:53

## 2024-03-24 RX ADMIN — AMIODARONE HYDROCHLORIDE 600 MILLIGRAM(S): 400 TABLET ORAL at 14:30

## 2024-03-24 RX ADMIN — Medication 4: at 22:37

## 2024-03-24 RX ADMIN — AZITHROMYCIN 250 MILLIGRAM(S): 500 TABLET, FILM COATED ORAL at 11:14

## 2024-03-24 RX ADMIN — GLYCOPYRROLATE AND FORMOTEROL FUMARATE 2 PUFF(S): 9; 4.8 AEROSOL, METERED RESPIRATORY (INHALATION) at 10:15

## 2024-03-24 RX ADMIN — CHLORHEXIDINE GLUCONATE 1 APPLICATION(S): 213 SOLUTION TOPICAL at 05:53

## 2024-03-24 RX ADMIN — Medication 2.5 MILLIGRAM(S): at 16:20

## 2024-03-24 RX ADMIN — Medication 2.5 MILLIGRAM(S): at 15:19

## 2024-03-24 RX ADMIN — PANTOPRAZOLE SODIUM 40 MILLIGRAM(S): 20 TABLET, DELAYED RELEASE ORAL at 05:53

## 2024-03-24 RX ADMIN — AMIODARONE HYDROCHLORIDE 300 MILLIGRAM(S): 400 TABLET ORAL at 13:45

## 2024-03-24 RX ADMIN — GLYCOPYRROLATE AND FORMOTEROL FUMARATE 2 PUFF(S): 9; 4.8 AEROSOL, METERED RESPIRATORY (INHALATION) at 21:55

## 2024-03-24 RX ADMIN — INSULIN GLARGINE 40 UNIT(S): 100 INJECTION, SOLUTION SUBCUTANEOUS at 22:36

## 2024-03-24 RX ADMIN — HEPARIN SODIUM 5000 UNIT(S): 5000 INJECTION INTRAVENOUS; SUBCUTANEOUS at 05:53

## 2024-03-24 RX ADMIN — SENNA PLUS 2 TABLET(S): 8.6 TABLET ORAL at 22:36

## 2024-03-24 RX ADMIN — Medication 20 MILLIGRAM(S): at 22:36

## 2024-03-24 RX ADMIN — Medication 81 MILLIGRAM(S): at 11:14

## 2024-03-24 RX ADMIN — Medication 6: at 18:54

## 2024-03-24 RX ADMIN — AMIODARONE HYDROCHLORIDE 200 MILLIGRAM(S): 400 TABLET ORAL at 10:13

## 2024-03-24 RX ADMIN — MUPIROCIN 1 APPLICATION(S): 20 OINTMENT TOPICAL at 06:54

## 2024-03-24 RX ADMIN — HEPARIN SODIUM 5000 UNIT(S): 5000 INJECTION INTRAVENOUS; SUBCUTANEOUS at 23:01

## 2024-03-24 RX ADMIN — GABAPENTIN 100 MILLIGRAM(S): 400 CAPSULE ORAL at 22:37

## 2024-03-24 NOTE — PROGRESS NOTE ADULT - SUBJECTIVE AND OBJECTIVE BOX
INTERVAL HPI/OVERNIGHT EVENTS:    3/19:  Bio Bental  EF normal/hyperdynamic    74yo Female Hx DM, severe AS, HTN, HLD, anemia presenting to [facility] with syncope.    CT Head - negative  ECHO: severe AS. Transferred to [facility] for intervention - possible TAVR vs BAV.     not candidate for perc intervention due to anatomy    3/19 - OR - intraop: 2.8 L LR/4 U pRBC/5 cryo/2 FFP/1 platelet  3/20 - dobutamine started (elevated LA) with improvement ; primacor added and Extubated    3/21 - Fib noted -  titrated off; milrinone 0.25  amio started and milrinone titrated down ; Abx started for tracheobronchitis    3/22: ongoing pAFib - amio and metoprolol -   3/23 - bradycardic - drop in UO and rising LA/LFT - concern for low output state  pacing inc (inc HR) and amio/metoprolol held -  trent placed  ECHO obtained - contacted by ECHO reporting RV down     Inotropic support started ; anemia addresed with transfusion support     right pigtail placed - 900 cc out; planned L - patient requested to be performed today     overall improvement - LA normal; making urine     no acute events reported overnight       ICU Vital Signs Last 24 Hrs  T(C): 36.7 (24 Mar 2024 09:24), Max: 36.9 (24 Mar 2024 01:01)  T(F): 98.1 (24 Mar 2024 09:24), Max: 98.4 (24 Mar 2024 01:01)  HR: 93 (24 Mar 2024 11:00) (78 - 97) sinus   BP: 137/60 (24 Mar 2024 11:00) (119/58 - 155/65)  BP(mean): 86 (24 Mar 2024 11:00) (79 - 101)  RR: 14 (24 Mar 2024 11:00) (14 - 19)  SpO2: 98% (24 Mar 2024 11:00) (92% - 98%) 4L NC    Qtts:  3    I&O's Summary    23 Mar 2024 07:01  -  24 Mar 2024 07:00  --------------------------------------------------------  IN: 1916.2 mL / OUT: 2050 mL / NET: -133.8 mL    24 Mar 2024 07:01  -  24 Mar 2024 12:20  --------------------------------------------------------  IN: 35 mL / OUT: 895 mL / NET: -860 mL    Physical Exam    Heart - regular (-)rub/gallop  Lungs - dec BS base - no rhonchi/wheeze  Abd - (+)BS soft NTND (-)r/r/g  Ext - warm to touch; no cyanosis/clubbing  Chest - incision site clean adn dry   Neuro - alert/oriented and interactive - nonfocal   Skin - no rash   LABS:                        9.0    12.72 )-----------( 351      ( 24 Mar 2024 11:10 )             28.5     -    142  |  107  |  22  ----------------------------<  91  4.2   |  25  |  0.84    Ca    9.9      24 Mar 2024 11:10  Phos  3.7     -  Mg     2.2         TPro  6.0  /  Alb  3.9  /  TBili  0.7  /  DBili  x   /  AST  79<H>  /  ALT  124<H>  /  AlkPhos  78  -24    PT/INR - ( 24 Mar 2024 11:10 )   PT: 14.2 sec;   INR: 1.25     pTT - ( 23 Mar 2024 16:42 )  PTT:30.7 sec    RADIOLOGY & ADDITIONAL STUDIES:  Patient with severe symptomatic aortic stenosis s/p Bio Bental - now POD#2 - post-op cardiogenic shock improving - slowly titrated off inotroic support - placed on Amio and metoprolol for atrial fib - noted relative hypotension this am SBP- 80-90. with concern of hypoperfusion - inc LA/LFT and no UO  - now improved with colloid resuscitation and inotrope    1. CV  Amio and BB held - pacer inc HR to inc CO 3/23  pacer now on backup   restart amio at 200 qd to avoid re-emergence of fib; hold BB and avoid hypotension  ECHO 3/23: s/p AVR and right ventricle appears mildly reduced.  restarted inotropic -  5 - now dec to 3  ASA    2. Pulm   incentive spirometry/ambulation with staff assist  titrate supplemental oxygen down to off -   tap on right 3/23 - plan tap no left today - sizable effusion   Abx started for tracheobronchitis - transition to zpak; short course CTX now #4 - d/c   Sputum 3/21 - normal olayinka     3. Endocrine  poor glycemic control - improved; /106  endocrine following    contains dextose as well     d/w patient/staff and CTS     I have spent/provided stated minutes of critical care time to this patient: 60

## 2024-03-25 ENCOUNTER — RESULT REVIEW (OUTPATIENT)
Age: 74
End: 2024-03-25

## 2024-03-25 LAB
ALBUMIN SERPL ELPH-MCNC: 3.6 G/DL — SIGNIFICANT CHANGE UP (ref 3.3–5)
ALBUMIN SERPL ELPH-MCNC: 3.7 G/DL — SIGNIFICANT CHANGE UP (ref 3.3–5)
ALBUMIN SERPL ELPH-MCNC: 4.1 G/DL — SIGNIFICANT CHANGE UP (ref 3.3–5)
ALP SERPL-CCNC: 126 U/L — HIGH (ref 40–120)
ALP SERPL-CCNC: 140 U/L — HIGH (ref 40–120)
ALP SERPL-CCNC: 147 U/L — HIGH (ref 40–120)
ALT FLD-CCNC: 132 U/L — HIGH (ref 10–45)
ALT FLD-CCNC: 158 U/L — HIGH (ref 10–45)
ALT FLD-CCNC: 181 U/L — HIGH (ref 10–45)
ANION GAP SERPL CALC-SCNC: 10 MMOL/L — SIGNIFICANT CHANGE UP (ref 5–17)
ANION GAP SERPL CALC-SCNC: 13 MMOL/L — SIGNIFICANT CHANGE UP (ref 5–17)
ANION GAP SERPL CALC-SCNC: 4 MMOL/L — LOW (ref 5–17)
APTT BLD: 30.6 SEC — SIGNIFICANT CHANGE UP (ref 24.5–35.6)
APTT BLD: 31.4 SEC — SIGNIFICANT CHANGE UP (ref 24.5–35.6)
APTT BLD: 31.5 SEC — SIGNIFICANT CHANGE UP (ref 24.5–35.6)
AST SERPL-CCNC: 109 U/L — HIGH (ref 10–40)
AST SERPL-CCNC: 65 U/L — HIGH (ref 10–40)
AST SERPL-CCNC: 78 U/L — HIGH (ref 10–40)
BASE EXCESS BLDV CALC-SCNC: 4.6 MMOL/L — HIGH (ref -2–3)
BASE EXCESS BLDV CALC-SCNC: 4.9 MMOL/L — HIGH (ref -2–3)
BASE EXCESS BLDV CALC-SCNC: 6.6 MMOL/L — HIGH (ref -2–3)
BASOPHILS # BLD AUTO: 0.02 K/UL — SIGNIFICANT CHANGE UP (ref 0–0.2)
BASOPHILS NFR BLD AUTO: 0.1 % — SIGNIFICANT CHANGE UP (ref 0–2)
BILIRUB SERPL-MCNC: 0.5 MG/DL — SIGNIFICANT CHANGE UP (ref 0.2–1.2)
BILIRUB SERPL-MCNC: 0.6 MG/DL — SIGNIFICANT CHANGE UP (ref 0.2–1.2)
BILIRUB SERPL-MCNC: 0.6 MG/DL — SIGNIFICANT CHANGE UP (ref 0.2–1.2)
BUN SERPL-MCNC: 18 MG/DL — SIGNIFICANT CHANGE UP (ref 7–23)
BUN SERPL-MCNC: 20 MG/DL — SIGNIFICANT CHANGE UP (ref 7–23)
BUN SERPL-MCNC: 21 MG/DL — SIGNIFICANT CHANGE UP (ref 7–23)
CALCIUM SERPL-MCNC: 10 MG/DL — SIGNIFICANT CHANGE UP (ref 8.4–10.5)
CALCIUM SERPL-MCNC: 9.4 MG/DL — SIGNIFICANT CHANGE UP (ref 8.4–10.5)
CALCIUM SERPL-MCNC: 9.7 MG/DL — SIGNIFICANT CHANGE UP (ref 8.4–10.5)
CHLORIDE SERPL-SCNC: 105 MMOL/L — SIGNIFICANT CHANGE UP (ref 96–108)
CHLORIDE SERPL-SCNC: 105 MMOL/L — SIGNIFICANT CHANGE UP (ref 96–108)
CHLORIDE SERPL-SCNC: 110 MMOL/L — HIGH (ref 96–108)
CO2 BLDV-SCNC: 29.5 MMOL/L — HIGH (ref 22–26)
CO2 BLDV-SCNC: 30.5 MMOL/L — HIGH (ref 22–26)
CO2 BLDV-SCNC: 31.7 MMOL/L — HIGH (ref 22–26)
CO2 SERPL-SCNC: 25 MMOL/L — SIGNIFICANT CHANGE UP (ref 22–31)
CO2 SERPL-SCNC: 26 MMOL/L — SIGNIFICANT CHANGE UP (ref 22–31)
CO2 SERPL-SCNC: 26 MMOL/L — SIGNIFICANT CHANGE UP (ref 22–31)
CREAT SERPL-MCNC: 0.75 MG/DL — SIGNIFICANT CHANGE UP (ref 0.5–1.3)
CREAT SERPL-MCNC: 0.79 MG/DL — SIGNIFICANT CHANGE UP (ref 0.5–1.3)
CREAT SERPL-MCNC: 0.81 MG/DL — SIGNIFICANT CHANGE UP (ref 0.5–1.3)
CULTURE RESULTS: ABNORMAL
EGFR: 78 ML/MIN/1.73M2 — SIGNIFICANT CHANGE UP
EGFR: 80 ML/MIN/1.73M2 — SIGNIFICANT CHANGE UP
EGFR: 85 ML/MIN/1.73M2 — SIGNIFICANT CHANGE UP
EOSINOPHIL # BLD AUTO: 0.03 K/UL — SIGNIFICANT CHANGE UP (ref 0–0.5)
EOSINOPHIL # BLD AUTO: 0.04 K/UL — SIGNIFICANT CHANGE UP (ref 0–0.5)
EOSINOPHIL # BLD AUTO: 0.05 K/UL — SIGNIFICANT CHANGE UP (ref 0–0.5)
EOSINOPHIL NFR BLD AUTO: 0.2 % — SIGNIFICANT CHANGE UP (ref 0–6)
EOSINOPHIL NFR BLD AUTO: 0.3 % — SIGNIFICANT CHANGE UP (ref 0–6)
EOSINOPHIL NFR BLD AUTO: 0.4 % — SIGNIFICANT CHANGE UP (ref 0–6)
GAS PNL BLDA: SIGNIFICANT CHANGE UP
GAS PNL BLDV: SIGNIFICANT CHANGE UP
GAS PNL BLDV: SIGNIFICANT CHANGE UP
GLUCOSE BLDC GLUCOMTR-MCNC: 112 MG/DL — HIGH (ref 70–99)
GLUCOSE BLDC GLUCOMTR-MCNC: 148 MG/DL — HIGH (ref 70–99)
GLUCOSE BLDC GLUCOMTR-MCNC: 176 MG/DL — HIGH (ref 70–99)
GLUCOSE BLDC GLUCOMTR-MCNC: 59 MG/DL — LOW (ref 70–99)
GLUCOSE BLDC GLUCOMTR-MCNC: 74 MG/DL — SIGNIFICANT CHANGE UP (ref 70–99)
GLUCOSE BLDC GLUCOMTR-MCNC: 81 MG/DL — SIGNIFICANT CHANGE UP (ref 70–99)
GLUCOSE BLDC GLUCOMTR-MCNC: 86 MG/DL — SIGNIFICANT CHANGE UP (ref 70–99)
GLUCOSE BLDC GLUCOMTR-MCNC: 96 MG/DL — SIGNIFICANT CHANGE UP (ref 70–99)
GLUCOSE BLDC GLUCOMTR-MCNC: 99 MG/DL — SIGNIFICANT CHANGE UP (ref 70–99)
GLUCOSE SERPL-MCNC: 148 MG/DL — HIGH (ref 70–99)
GLUCOSE SERPL-MCNC: 165 MG/DL — HIGH (ref 70–99)
GLUCOSE SERPL-MCNC: 77 MG/DL — SIGNIFICANT CHANGE UP (ref 70–99)
GRAM STN FLD: ABNORMAL
HCO3 BLDV-SCNC: 28 MMOL/L — SIGNIFICANT CHANGE UP (ref 22–29)
HCO3 BLDV-SCNC: 29 MMOL/L — SIGNIFICANT CHANGE UP (ref 22–29)
HCO3 BLDV-SCNC: 30 MMOL/L — HIGH (ref 22–29)
HCT VFR BLD CALC: 27.2 % — LOW (ref 34.5–45)
HCT VFR BLD CALC: 27.2 % — LOW (ref 34.5–45)
HCT VFR BLD CALC: 29.7 % — LOW (ref 34.5–45)
HGB BLD-MCNC: 8.7 G/DL — LOW (ref 11.5–15.5)
HGB BLD-MCNC: 8.8 G/DL — LOW (ref 11.5–15.5)
HGB BLD-MCNC: 9.6 G/DL — LOW (ref 11.5–15.5)
IMM GRANULOCYTES NFR BLD AUTO: 0.7 % — SIGNIFICANT CHANGE UP (ref 0–0.9)
INR BLD: 1.22 — HIGH (ref 0.85–1.18)
INR BLD: 1.26 — HIGH (ref 0.85–1.18)
INR BLD: 1.28 — HIGH (ref 0.85–1.18)
LACTATE SERPL-SCNC: 1.1 MMOL/L — SIGNIFICANT CHANGE UP (ref 0.5–2)
LACTATE SERPL-SCNC: 1.1 MMOL/L — SIGNIFICANT CHANGE UP (ref 0.5–2)
LACTATE SERPL-SCNC: 1.3 MMOL/L — SIGNIFICANT CHANGE UP (ref 0.5–2)
LYMPHOCYTES # BLD AUTO: 0.85 K/UL — LOW (ref 1–3.3)
LYMPHOCYTES # BLD AUTO: 0.96 K/UL — LOW (ref 1–3.3)
LYMPHOCYTES # BLD AUTO: 0.99 K/UL — LOW (ref 1–3.3)
LYMPHOCYTES # BLD AUTO: 5.7 % — LOW (ref 13–44)
LYMPHOCYTES # BLD AUTO: 7 % — LOW (ref 13–44)
LYMPHOCYTES # BLD AUTO: 7.2 % — LOW (ref 13–44)
MAGNESIUM SERPL-MCNC: 2.2 MG/DL — SIGNIFICANT CHANGE UP (ref 1.6–2.6)
MAGNESIUM SERPL-MCNC: 2.2 MG/DL — SIGNIFICANT CHANGE UP (ref 1.6–2.6)
MAGNESIUM SERPL-MCNC: 2.3 MG/DL — SIGNIFICANT CHANGE UP (ref 1.6–2.6)
MCHC RBC-ENTMCNC: 26.9 PG — LOW (ref 27–34)
MCHC RBC-ENTMCNC: 26.9 PG — LOW (ref 27–34)
MCHC RBC-ENTMCNC: 27.6 PG — SIGNIFICANT CHANGE UP (ref 27–34)
MCHC RBC-ENTMCNC: 32 GM/DL — SIGNIFICANT CHANGE UP (ref 32–36)
MCHC RBC-ENTMCNC: 32.3 GM/DL — SIGNIFICANT CHANGE UP (ref 32–36)
MCHC RBC-ENTMCNC: 32.4 GM/DL — SIGNIFICANT CHANGE UP (ref 32–36)
MCV RBC AUTO: 83.2 FL — SIGNIFICANT CHANGE UP (ref 80–100)
MCV RBC AUTO: 84.2 FL — SIGNIFICANT CHANGE UP (ref 80–100)
MCV RBC AUTO: 85.3 FL — SIGNIFICANT CHANGE UP (ref 80–100)
MONOCYTES # BLD AUTO: 1.08 K/UL — HIGH (ref 0–0.9)
MONOCYTES # BLD AUTO: 1.23 K/UL — HIGH (ref 0–0.9)
MONOCYTES # BLD AUTO: 1.36 K/UL — HIGH (ref 0–0.9)
MONOCYTES NFR BLD AUTO: 7.9 % — SIGNIFICANT CHANGE UP (ref 2–14)
MONOCYTES NFR BLD AUTO: 8.2 % — SIGNIFICANT CHANGE UP (ref 2–14)
MONOCYTES NFR BLD AUTO: 9.9 % — SIGNIFICANT CHANGE UP (ref 2–14)
NEUTROPHILS # BLD AUTO: 11.2 K/UL — HIGH (ref 1.8–7.4)
NEUTROPHILS # BLD AUTO: 11.43 K/UL — HIGH (ref 1.8–7.4)
NEUTROPHILS # BLD AUTO: 12.72 K/UL — HIGH (ref 1.8–7.4)
NEUTROPHILS NFR BLD AUTO: 81.8 % — HIGH (ref 43–77)
NEUTROPHILS NFR BLD AUTO: 83.9 % — HIGH (ref 43–77)
NEUTROPHILS NFR BLD AUTO: 85.1 % — HIGH (ref 43–77)
NRBC # BLD: 0 /100 WBCS — SIGNIFICANT CHANGE UP (ref 0–0)
PCO2 BLDV: 38 MMHG — LOW (ref 39–42)
PCO2 BLDV: 40 MMHG — SIGNIFICANT CHANGE UP (ref 39–42)
PCO2 BLDV: 41 MMHG — SIGNIFICANT CHANGE UP (ref 39–42)
PH BLDV: 7.46 — HIGH (ref 7.32–7.43)
PH BLDV: 7.48 — HIGH (ref 7.32–7.43)
PH BLDV: 7.49 — HIGH (ref 7.32–7.43)
PHOSPHATE SERPL-MCNC: 3.9 MG/DL — SIGNIFICANT CHANGE UP (ref 2.5–4.5)
PHOSPHATE SERPL-MCNC: 4 MG/DL — SIGNIFICANT CHANGE UP (ref 2.5–4.5)
PHOSPHATE SERPL-MCNC: 4 MG/DL — SIGNIFICANT CHANGE UP (ref 2.5–4.5)
PLATELET # BLD AUTO: 362 K/UL — SIGNIFICANT CHANGE UP (ref 150–400)
PLATELET # BLD AUTO: 363 K/UL — SIGNIFICANT CHANGE UP (ref 150–400)
PLATELET # BLD AUTO: 383 K/UL — SIGNIFICANT CHANGE UP (ref 150–400)
PO2 BLDV: 34 MMHG — SIGNIFICANT CHANGE UP (ref 25–45)
PO2 BLDV: 36 MMHG — SIGNIFICANT CHANGE UP (ref 25–45)
PO2 BLDV: 42 MMHG — SIGNIFICANT CHANGE UP (ref 25–45)
POTASSIUM SERPL-MCNC: 3.5 MMOL/L — SIGNIFICANT CHANGE UP (ref 3.5–5.3)
POTASSIUM SERPL-MCNC: 4.2 MMOL/L — SIGNIFICANT CHANGE UP (ref 3.5–5.3)
POTASSIUM SERPL-MCNC: 4.4 MMOL/L — SIGNIFICANT CHANGE UP (ref 3.5–5.3)
POTASSIUM SERPL-SCNC: 3.5 MMOL/L — SIGNIFICANT CHANGE UP (ref 3.5–5.3)
POTASSIUM SERPL-SCNC: 4.2 MMOL/L — SIGNIFICANT CHANGE UP (ref 3.5–5.3)
POTASSIUM SERPL-SCNC: 4.4 MMOL/L — SIGNIFICANT CHANGE UP (ref 3.5–5.3)
PROT SERPL-MCNC: 5.7 G/DL — LOW (ref 6–8.3)
PROT SERPL-MCNC: 6.1 G/DL — SIGNIFICANT CHANGE UP (ref 6–8.3)
PROT SERPL-MCNC: 6.3 G/DL — SIGNIFICANT CHANGE UP (ref 6–8.3)
PROTHROM AB SERPL-ACNC: 13.8 SEC — HIGH (ref 9.5–13)
PROTHROM AB SERPL-ACNC: 14.3 SEC — HIGH (ref 9.5–13)
PROTHROM AB SERPL-ACNC: 14.5 SEC — HIGH (ref 9.5–13)
RBC # BLD: 3.23 M/UL — LOW (ref 3.8–5.2)
RBC # BLD: 3.27 M/UL — LOW (ref 3.8–5.2)
RBC # BLD: 3.48 M/UL — LOW (ref 3.8–5.2)
RBC # FLD: 16.5 % — HIGH (ref 10.3–14.5)
RBC # FLD: 16.6 % — HIGH (ref 10.3–14.5)
RBC # FLD: 16.6 % — HIGH (ref 10.3–14.5)
SAO2 % BLDV: 59.2 % — LOW (ref 67–88)
SAO2 % BLDV: 62.3 % — LOW (ref 67–88)
SAO2 % BLDV: 69.1 % — SIGNIFICANT CHANGE UP (ref 67–88)
SODIUM SERPL-SCNC: 139 MMOL/L — SIGNIFICANT CHANGE UP (ref 135–145)
SODIUM SERPL-SCNC: 141 MMOL/L — SIGNIFICANT CHANGE UP (ref 135–145)
SODIUM SERPL-SCNC: 144 MMOL/L — SIGNIFICANT CHANGE UP (ref 135–145)
SPECIMEN SOURCE: SIGNIFICANT CHANGE UP
WBC # BLD: 13.63 K/UL — HIGH (ref 3.8–10.5)
WBC # BLD: 13.7 K/UL — HIGH (ref 3.8–10.5)
WBC # BLD: 14.96 K/UL — HIGH (ref 3.8–10.5)
WBC # FLD AUTO: 13.63 K/UL — HIGH (ref 3.8–10.5)
WBC # FLD AUTO: 13.7 K/UL — HIGH (ref 3.8–10.5)
WBC # FLD AUTO: 14.96 K/UL — HIGH (ref 3.8–10.5)

## 2024-03-25 PROCEDURE — 99292 CRITICAL CARE ADDL 30 MIN: CPT | Mod: 25

## 2024-03-25 PROCEDURE — 71045 X-RAY EXAM CHEST 1 VIEW: CPT | Mod: 26

## 2024-03-25 PROCEDURE — 99232 SBSQ HOSP IP/OBS MODERATE 35: CPT

## 2024-03-25 PROCEDURE — 31645 BRNCHSC W/THER ASPIR 1ST: CPT

## 2024-03-25 PROCEDURE — 36620 INSERTION CATHETER ARTERY: CPT | Mod: RT

## 2024-03-25 PROCEDURE — 71045 X-RAY EXAM CHEST 1 VIEW: CPT | Mod: 26,77

## 2024-03-25 PROCEDURE — 93306 TTE W/DOPPLER COMPLETE: CPT | Mod: 26

## 2024-03-25 PROCEDURE — 76937 US GUIDE VASCULAR ACCESS: CPT | Mod: 26,RT

## 2024-03-25 PROCEDURE — 99291 CRITICAL CARE FIRST HOUR: CPT | Mod: 25

## 2024-03-25 PROCEDURE — 99152 MOD SED SAME PHYS/QHP 5/>YRS: CPT

## 2024-03-25 RX ORDER — AMIODARONE HYDROCHLORIDE 400 MG/1
1 TABLET ORAL
Qty: 450 | Refills: 0 | Status: DISCONTINUED | OUTPATIENT
Start: 2024-03-25 | End: 2024-03-26

## 2024-03-25 RX ORDER — FUROSEMIDE 40 MG
40 TABLET ORAL ONCE
Refills: 0 | Status: COMPLETED | OUTPATIENT
Start: 2024-03-25 | End: 2024-03-25

## 2024-03-25 RX ORDER — INSULIN GLARGINE 100 [IU]/ML
35 INJECTION, SOLUTION SUBCUTANEOUS AT BEDTIME
Refills: 0 | Status: DISCONTINUED | OUTPATIENT
Start: 2024-03-25 | End: 2024-03-25

## 2024-03-25 RX ORDER — DIGOXIN 250 MCG
250 TABLET ORAL ONCE
Refills: 0 | Status: COMPLETED | OUTPATIENT
Start: 2024-03-25 | End: 2024-03-25

## 2024-03-25 RX ORDER — INSULIN LISPRO 100/ML
7 VIAL (ML) SUBCUTANEOUS ONCE
Refills: 0 | Status: COMPLETED | OUTPATIENT
Start: 2024-03-25 | End: 2024-03-25

## 2024-03-25 RX ORDER — MILRINONE LACTATE 1 MG/ML
0.25 INJECTION, SOLUTION INTRAVENOUS
Qty: 20 | Refills: 0 | Status: DISCONTINUED | OUTPATIENT
Start: 2024-03-25 | End: 2024-03-26

## 2024-03-25 RX ORDER — ALBUMIN HUMAN 25 %
50 VIAL (ML) INTRAVENOUS ONCE
Refills: 0 | Status: COMPLETED | OUTPATIENT
Start: 2024-03-25 | End: 2024-03-25

## 2024-03-25 RX ORDER — FUROSEMIDE 40 MG
20 TABLET ORAL ONCE
Refills: 0 | Status: COMPLETED | OUTPATIENT
Start: 2024-03-25 | End: 2024-03-25

## 2024-03-25 RX ORDER — DIGOXIN 250 MCG
500 TABLET ORAL ONCE
Refills: 0 | Status: COMPLETED | OUTPATIENT
Start: 2024-03-25 | End: 2024-03-25

## 2024-03-25 RX ORDER — BUMETANIDE 0.25 MG/ML
2 INJECTION INTRAMUSCULAR; INTRAVENOUS ONCE
Refills: 0 | Status: COMPLETED | OUTPATIENT
Start: 2024-03-25 | End: 2024-03-25

## 2024-03-25 RX ORDER — CEFTRIAXONE 500 MG/1
1000 INJECTION, POWDER, FOR SOLUTION INTRAMUSCULAR; INTRAVENOUS EVERY 24 HOURS
Refills: 0 | Status: DISCONTINUED | OUTPATIENT
Start: 2024-03-25 | End: 2024-03-26

## 2024-03-25 RX ORDER — AMIODARONE HYDROCHLORIDE 400 MG/1
150 TABLET ORAL ONCE
Refills: 0 | Status: COMPLETED | OUTPATIENT
Start: 2024-03-25 | End: 2024-03-25

## 2024-03-25 RX ORDER — POTASSIUM CHLORIDE 20 MEQ
20 PACKET (EA) ORAL
Refills: 0 | Status: COMPLETED | OUTPATIENT
Start: 2024-03-25 | End: 2024-03-25

## 2024-03-25 RX ORDER — INSULIN GLARGINE 100 [IU]/ML
12 INJECTION, SOLUTION SUBCUTANEOUS AT BEDTIME
Refills: 0 | Status: DISCONTINUED | OUTPATIENT
Start: 2024-03-25 | End: 2024-03-26

## 2024-03-25 RX ORDER — MIDAZOLAM HYDROCHLORIDE 1 MG/ML
2 INJECTION, SOLUTION INTRAMUSCULAR; INTRAVENOUS ONCE
Refills: 0 | Status: DISCONTINUED | OUTPATIENT
Start: 2024-03-25 | End: 2024-03-25

## 2024-03-25 RX ORDER — INSULIN LISPRO 100/ML
5 VIAL (ML) SUBCUTANEOUS
Refills: 0 | Status: DISCONTINUED | OUTPATIENT
Start: 2024-03-25 | End: 2024-03-27

## 2024-03-25 RX ORDER — DILTIAZEM HCL 120 MG
10 CAPSULE, EXT RELEASE 24 HR ORAL ONCE
Refills: 0 | Status: COMPLETED | OUTPATIENT
Start: 2024-03-25 | End: 2024-03-25

## 2024-03-25 RX ADMIN — AMIODARONE HYDROCHLORIDE 600 MILLIGRAM(S): 400 TABLET ORAL at 17:00

## 2024-03-25 RX ADMIN — MILRINONE LACTATE 4.19 MICROGRAM(S)/KG/MIN: 1 INJECTION, SOLUTION INTRAVENOUS at 13:00

## 2024-03-25 RX ADMIN — POLYETHYLENE GLYCOL 3350 17 GRAM(S): 17 POWDER, FOR SOLUTION ORAL at 13:38

## 2024-03-25 RX ADMIN — Medication 15 UNIT(S): at 13:48

## 2024-03-25 RX ADMIN — HEPARIN SODIUM 5000 UNIT(S): 5000 INJECTION INTRAVENOUS; SUBCUTANEOUS at 21:50

## 2024-03-25 RX ADMIN — PANTOPRAZOLE SODIUM 40 MILLIGRAM(S): 20 TABLET, DELAYED RELEASE ORAL at 06:52

## 2024-03-25 RX ADMIN — Medication 81 MILLIGRAM(S): at 13:41

## 2024-03-25 RX ADMIN — Medication 7 UNIT(S): at 07:51

## 2024-03-25 RX ADMIN — CEFTRIAXONE 100 MILLIGRAM(S): 500 INJECTION, POWDER, FOR SOLUTION INTRAMUSCULAR; INTRAVENOUS at 10:34

## 2024-03-25 RX ADMIN — Medication 40 MILLIGRAM(S): at 15:05

## 2024-03-25 RX ADMIN — AMIODARONE HYDROCHLORIDE 600 MILLIGRAM(S): 400 TABLET ORAL at 13:00

## 2024-03-25 RX ADMIN — GABAPENTIN 100 MILLIGRAM(S): 400 CAPSULE ORAL at 06:52

## 2024-03-25 RX ADMIN — Medication 50 MILLILITER(S): at 13:39

## 2024-03-25 RX ADMIN — Medication 20 MILLIGRAM(S): at 10:34

## 2024-03-25 RX ADMIN — AZITHROMYCIN 250 MILLIGRAM(S): 500 TABLET, FILM COATED ORAL at 13:38

## 2024-03-25 RX ADMIN — AMIODARONE HYDROCHLORIDE 200 MILLIGRAM(S): 400 TABLET ORAL at 04:35

## 2024-03-25 RX ADMIN — GLYCOPYRROLATE AND FORMOTEROL FUMARATE 2 PUFF(S): 9; 4.8 AEROSOL, METERED RESPIRATORY (INHALATION) at 22:43

## 2024-03-25 RX ADMIN — OXYCODONE HYDROCHLORIDE 5 MILLIGRAM(S): 5 TABLET ORAL at 17:48

## 2024-03-25 RX ADMIN — INSULIN GLARGINE 12 UNIT(S): 100 INJECTION, SOLUTION SUBCUTANEOUS at 23:36

## 2024-03-25 RX ADMIN — Medication 20 MILLIGRAM(S): at 03:30

## 2024-03-25 RX ADMIN — Medication 50 MILLIEQUIVALENT(S): at 21:49

## 2024-03-25 RX ADMIN — Medication 250 MICROGRAM(S): at 13:39

## 2024-03-25 RX ADMIN — Medication 500 MICROGRAM(S): at 05:21

## 2024-03-25 RX ADMIN — Medication 50 MILLILITER(S): at 11:11

## 2024-03-25 RX ADMIN — AMIODARONE HYDROCHLORIDE 33.3 MG/MIN: 400 TABLET ORAL at 21:51

## 2024-03-25 RX ADMIN — GABAPENTIN 100 MILLIGRAM(S): 400 CAPSULE ORAL at 21:50

## 2024-03-25 RX ADMIN — HEPARIN SODIUM 5000 UNIT(S): 5000 INJECTION INTRAVENOUS; SUBCUTANEOUS at 06:52

## 2024-03-25 RX ADMIN — HEPARIN SODIUM 5000 UNIT(S): 5000 INJECTION INTRAVENOUS; SUBCUTANEOUS at 13:39

## 2024-03-25 RX ADMIN — Medication 250 MICROGRAM(S): at 21:50

## 2024-03-25 RX ADMIN — BUMETANIDE 2 MILLIGRAM(S): 0.25 INJECTION INTRAMUSCULAR; INTRAVENOUS at 23:36

## 2024-03-25 RX ADMIN — Medication 3.35 MICROGRAM(S)/KG/MIN: at 10:33

## 2024-03-25 RX ADMIN — Medication 10 MILLIGRAM(S): at 06:07

## 2024-03-25 RX ADMIN — Medication 2: at 11:15

## 2024-03-25 RX ADMIN — Medication 50 MILLIEQUIVALENT(S): at 18:37

## 2024-03-25 RX ADMIN — MIDAZOLAM HYDROCHLORIDE 2 MILLIGRAM(S): 1 INJECTION, SOLUTION INTRAMUSCULAR; INTRAVENOUS at 12:10

## 2024-03-25 RX ADMIN — GLYCOPYRROLATE AND FORMOTEROL FUMARATE 2 PUFF(S): 9; 4.8 AEROSOL, METERED RESPIRATORY (INHALATION) at 09:21

## 2024-03-25 RX ADMIN — Medication 50 MILLIEQUIVALENT(S): at 19:09

## 2024-03-25 RX ADMIN — OXYCODONE HYDROCHLORIDE 5 MILLIGRAM(S): 5 TABLET ORAL at 18:45

## 2024-03-25 RX ADMIN — AMIODARONE HYDROCHLORIDE 200 MILLIGRAM(S): 400 TABLET ORAL at 03:22

## 2024-03-25 RX ADMIN — GABAPENTIN 100 MILLIGRAM(S): 400 CAPSULE ORAL at 13:38

## 2024-03-25 NOTE — PROGRESS NOTE ADULT - ASSESSMENT
72 y/o female PMH DM, severe AS, EF 60%, HTN, HLD, anemia who was admitted to [facility] with syncope. CT negative for CVA, but echo showed severe AS s/p SAVR 3/19/2024.    Endocrine consulted for T2 diabetes management.     A1C: 9.6 %  C-peptide 1.8 with  - March 2024  BUN: 18  Creatinine: 0.79  GFR: 80  Weight: 55.9  BMI: 22.5  EF: 60%

## 2024-03-25 NOTE — PROGRESS NOTE ADULT - SUBJECTIVE AND OBJECTIVE BOX
Name of procedure – Flexible fiberoptic bronchoscopy        Flexible fiberoptic bronchoscopy was performed underversed consc sedn     Pre-procedural assessment reveals no risk of Tb    The scope was advanced nasotracheally   VC were sharp and moving well…    The nakita was sharp  Right LL and RML air way were patent , scant secretions  LLL and JULIETA air way were patent , scant secretions    CXR confirmed no pneumothorax post bronch  There were no complications  The patient tolerated the procedure well

## 2024-03-25 NOTE — PROGRESS NOTE ADULT - PROBLEM SELECTOR PLAN 1
Type 2 diabetes mellitus with hyperglycemia  - Please decrease lantus to 35  units at bedtime.   - Decrease lispro to 5 units with meals.  - Continue lispro moderate dose sliding scale before meals and at bedtime.  - Patient's fingerstick glucose goal is 100-180 mg/dL.    - Consistent carb diet. Order Glucerna.  - For discharge, patient can ***.    - Patient can follow up at discharge with [facility] by calling [phone] to make an appointment.      Discussed with Dr Huffman. Primary team updated.

## 2024-03-25 NOTE — PROGRESS NOTE ADULT - SUBJECTIVE AND OBJECTIVE BOX
SUBJECTIVE / INTERVAL HPI: Patient was seen and examined this morning. Events of weekend reviewed. Returned to ICU over weekend with pleural effusion, a fib, av pacing and tracheobronchitis. Bedside bronch today. Appetite very decreased today.     CAPILLARY BLOOD GLUCOSE & INSULIN RECEIVED  252 mg/dL (03-24 @ 18:51) - Lispro 6  245 mg/dL (03-24 @ 20:55)  223 mg/dL (03-24 @ 22:08) - Lantus 40 + lispro 4  165 mg/dL (03-25 @ 02:42)  148 mg/dL (03-25 @ 07:41) - Lispro 7  148 mg/dL (03-25 @ 09:49)  176 mg/dL (03-25 @ 10:55)  112 mg/dL (03-25 @ 13:44) - Lispro 15  81 mg/dL (03-25 @ 16:18)  77 mg/dL (03-25 @ 16:29)  86 mg/dL (03-25 @ 17:43)      REVIEW OF SYSTEMS  Constitutional:  Negative fever, chills + loss of appetite.  Eyes:  Negative blurry vision or double vision.  Cardiovascular:  Negative for chest pain or palpitations.  Respiratory:  Negative for cough, wheezing, + shortness of breath.    Gastrointestinal:  Negative for nausea, vomiting, diarrhea, constipation, or abdominal pain.  Genitourinary:  Negative frequency, urgency or dysuria.  Neurologic:  No headache, confusion, dizziness, lightheadedness.    PHYSICAL EXAM  Vital Signs Last 24 Hrs  T(C): 36.5 (25 Mar 2024 18:30), Max: 37.6 (24 Mar 2024 21:15)  T(F): 97.7 (25 Mar 2024 18:30), Max: 99.6 (24 Mar 2024 21:15)  HR: 116 (25 Mar 2024 18:00) (85 - 143)  BP: 123/82 (25 Mar 2024 11:00) (110/54 - 138/63)  BP(mean): 90 (25 Mar 2024 11:00) (77 - 92)  RR: 16 (25 Mar 2024 18:00) (14 - 20)  SpO2: 98% (25 Mar 2024 18:00) (92% - 100%)    Parameters below as of 25 Mar 2024 19:00  Patient On (Oxygen Delivery Method): nasal cannula, high flow  O2 Flow (L/min): 40  O2 Concentration (%): 50    Constitutional: Awake, alert, in no acute distress.   HEENT: Normocephalic, atraumatic, GEORGETTE, no proptosis or lid retraction.   Neck: supple, no acanthosis, no thyromegaly or palpable thyroid nodules.  Respiratory: Lungs clear to ausculation bilaterally. + CT. HFNC  Cardiovascular: regular rhythm, normal S1 and S2, + murmur + sternotomy  GI: soft, non-tender, non-distended, bowel sounds present, no masses appreciated.  Extremities: No lower extremity edema, peripheral pulses present.   Skin: no rashes.   Psychiatric: AAO x 3. Normal affect/mood.       LABS  CBC - WBC/HGB/HTC/PLT: 13.63/8.7/27.2/362 (03-25-24)  BMP - Na/K/Cl/Bicarb/BUN/Cr/Gluc/AG/eGFR: 144/3.5/105/26/18/0.79/77/13/80 (03-25-24)  Ca - 9.4 (03-25-24)  Phos - 3.9 (03-25-24)  Mg - 2.2 (03-25-24)  LFT - Alb/Tprot/Tbili/Dbili/AlkPhos/ALT/AST: 4.1/--/0.6/--/126/132/65 (03-25-24)  PT/aPTT/INR: 14.3/31.5/1.26 (03-25-24)   Thyroid Stimulating Hormone, Serum: 1.530 (03-12-24)      MEDICATIONS  MEDICATIONS  (STANDING):  aMIOdarone    Tablet 200 milliGRAM(s) Oral daily  aspirin enteric coated 81 milliGRAM(s) Oral daily  azithromycin   Tablet 250 milliGRAM(s) Oral daily  cefTRIAXone   IVPB 1000 milliGRAM(s) IV Intermittent every 24 hours  chlorhexidine 2% Cloths 1 Application(s) Topical daily  dextrose 5%. 1000 milliLiter(s) (50 mL/Hr) IV Continuous <Continuous>  dextrose 5%. 1000 milliLiter(s) (100 mL/Hr) IV Continuous <Continuous>  dextrose 50% Injectable 25 Gram(s) IV Push once  dextrose 50% Injectable 50 milliLiter(s) IV Push every 15 minutes  dextrose 50% Injectable 25 milliLiter(s) IV Push every 15 minutes  digoxin  Injectable 250 MICROGram(s) IV Push once  DOBUTamine Infusion 1 MICROgram(s)/kG/Min (1.68 mL/Hr) IV Continuous <Continuous>  gabapentin 100 milliGRAM(s) Oral three times a day  glucagon  Injectable 1 milliGRAM(s) IntraMuscular once  glycopyrrolate 9 MICROgram(s)/formoterol 4.8 MICROgram(s) Inhaler 2 Puff(s) Inhalation two times a day  heparin   Injectable 5000 Unit(s) SubCutaneous every 8 hours  insulin glargine Injectable (LANTUS) 35 Unit(s) SubCutaneous at bedtime  insulin lispro (ADMELOG) corrective regimen sliding scale   SubCutaneous Before meals and at bedtime  insulin lispro Injectable (ADMELOG) 5 Unit(s) SubCutaneous three times a day before meals  milrinone Infusion 0.25 MICROgram(s)/kG/Min (4.19 mL/Hr) IV Continuous <Continuous>  pantoprazole    Tablet 40 milliGRAM(s) Oral before breakfast  polyethylene glycol 3350 17 Gram(s) Oral daily  potassium chloride  20 mEq/100 mL IVPB 20 milliEquivalent(s) IV Intermittent every 2 hours  senna 2 Tablet(s) Oral at bedtime  sodium chloride 0.9%. 1000 milliLiter(s) (10 mL/Hr) IV Continuous <Continuous>    MEDICATIONS  (PRN):  dextrose Oral Gel 15 Gram(s) Oral once PRN Blood Glucose LESS THAN 70 milliGRAM(s)/deciliter  oxyCODONE    IR 5 milliGRAM(s) Oral every 6 hours PRN Moderate Pain (4 - 6)  oxyCODONE    IR 10 milliGRAM(s) Oral every 6 hours PRN Severe Pain (7 - 10)

## 2024-03-25 NOTE — PROGRESS NOTE ADULT - SUBJECTIVE AND OBJECTIVE BOX
CTICU  CRITICAL  CARE  attending     Hand off received 					   Pertinent clinical, laboratory, radiographic, hemodynamic, echocardiographic, respiratory data, microbiologic data and chart were reviewed and analyzed frequently throughout the course of the day and night  Patient seen and examined with CTS/ SH attending at bedside  Pt is a 73y , Female, HEALTH ISSUES - PROBLEM Dx:  Aortic stenosis, severe    HTN (hypertension)    Hyperlipidemia    Type 2 diabetes mellitus    Right ventricular systolic dysfunction without heart failure    Bilateral pleural effusion        , FAMILY HISTORY:  No pertinent family history in first degree relatives    PAST MEDICAL & SURGICAL HISTORY:  Aortic stenosis      HTN (hypertension)      DM (diabetes mellitus)      Hyperlipidemia      Anemia      No significant past surgical history        Patient is a 73y old  Female who presents with a chief complaint of severe AS (25 Mar 2024 18:57)      14 system review was unremarkable    Vital signs, hemodynamic and respiratory parameters were reviewed from the bedside nursing flowsheet.  ICU Vital Signs Last 24 Hrs  T(C): 36.2 (25 Mar 2024 21:01), Max: 36.8 (25 Mar 2024 08:57)  T(F): 97.1 (25 Mar 2024 21:01), Max: 98.3 (25 Mar 2024 08:57)  HR: 124 (25 Mar 2024 20:53) (85 - 143)  BP: 123/82 (25 Mar 2024 11:00) (110/54 - 138/63)  BP(mean): 90 (25 Mar 2024 11:00) (77 - 92)  ABP: 110/50 (25 Mar 2024 20:00) (96/57 - 135/55)  ABP(mean): 68 (25 Mar 2024 20:00) (67 - 78)  RR: 19 (25 Mar 2024 20:53) (15 - 20)  SpO2: 98% (25 Mar 2024 20:53) (92% - 100%)    O2 Parameters below as of 25 Mar 2024 20:53  Patient On (Oxygen Delivery Method): nasal cannula, high flow  O2 Flow (L/min): 40  O2 Concentration (%): 50      Adult Advanced Hemodynamics Last 24 Hrs  CVP(mm Hg): 16 (25 Mar 2024 20:00) (5 - 19)  CVP(cm H2O): --  CO: 5.3 (25 Mar 2024 20:00) (3.4 - 6)  CI: 3.4 (25 Mar 2024 20:00) (2.2 - 3.8)  PA: --  PA(mean): --  PCWP: --  SVR: 953 (25 Mar 2024 20:00) (732 - 972)  SVRI: 1222 (25 Mar 2024 20:00) (1156 - 1438)  PVR: --  PVRI: --, ABG - ( 25 Mar 2024 16:25 )  pH, Arterial: 7.50  pH, Blood: x     /  pCO2: 35    /  pO2: 101   / HCO3: 27    / Base Excess: 4.2   /  SaO2: 99.2                Intake and output was reviewed and the fluid balance was calculated  Daily     Daily   I&O's Summary    24 Mar 2024 07:01  -  25 Mar 2024 07:00  --------------------------------------------------------  IN: 1002.8 mL / OUT: 3075 mL / NET: -2072.2 mL    25 Mar 2024 07:01  -  25 Mar 2024 22:43  --------------------------------------------------------  IN: 1228.5 mL / OUT: 1390 mL / NET: -161.5 mL        All lines and drain sites were assessed  Glycemic trend was reviewedCAPILLARY BLOOD GLUCOSE      POCT Blood Glucose.: 74 mg/dL (25 Mar 2024 22:10)    No acute change in mental status  Auscultation of the chest reveals equal bs  Abdomen is soft  Extremities are warm and well perfused  Wounds appear clean and unremarkable  Antibiotics are periop    labs  CBC Full  -  ( 25 Mar 2024 16:29 )  WBC Count : 13.63 K/uL  RBC Count : 3.23 M/uL  Hemoglobin : 8.7 g/dL  Hematocrit : 27.2 %  Platelet Count - Automated : 362 K/uL  Mean Cell Volume : 84.2 fl  Mean Cell Hemoglobin : 26.9 pg  Mean Cell Hemoglobin Concentration : 32.0 gm/dL  Auto Neutrophil # : 11.43 K/uL  Auto Lymphocyte # : 0.96 K/uL  Auto Monocyte # : 1.08 K/uL  Auto Eosinophil # : 0.05 K/uL  Auto Basophil # : 0.02 K/uL  Auto Neutrophil % : 83.9 %  Auto Lymphocyte % : 7.0 %  Auto Monocyte % : 7.9 %  Auto Eosinophil % : 0.4 %  Auto Basophil % : 0.1 %    03-25    144  |  105  |  18  ----------------------------<  77  3.5   |  26  |  0.79    Ca    9.4      25 Mar 2024 16:29  Phos  3.9     03-25  Mg     2.2     03-25    TPro  6.1  /  Alb  4.1  /  TBili  0.6  /  DBili  x   /  AST  65<H>  /  ALT  132<H>  /  AlkPhos  126<H>  03-25    PT/INR - ( 25 Mar 2024 16:29 )   PT: 14.3 sec;   INR: 1.26          PTT - ( 25 Mar 2024 16:29 )  PTT:31.5 sec  The current medications were reviewed   MEDICATIONS  (STANDING):  aMIOdarone    Tablet 200 milliGRAM(s) Oral daily  aMIOdarone Infusion 1 mG/Min (33.3 mL/Hr) IV Continuous <Continuous>  aspirin enteric coated 81 milliGRAM(s) Oral daily  azithromycin   Tablet 250 milliGRAM(s) Oral daily  cefTRIAXone   IVPB 1000 milliGRAM(s) IV Intermittent every 24 hours  chlorhexidine 2% Cloths 1 Application(s) Topical daily  dextrose 5%. 1000 milliLiter(s) (50 mL/Hr) IV Continuous <Continuous>  dextrose 5%. 1000 milliLiter(s) (100 mL/Hr) IV Continuous <Continuous>  dextrose 50% Injectable 25 Gram(s) IV Push once  dextrose 50% Injectable 50 milliLiter(s) IV Push every 15 minutes  dextrose 50% Injectable 25 milliLiter(s) IV Push every 15 minutes  DOBUTamine Infusion 1 MICROgram(s)/kG/Min (1.68 mL/Hr) IV Continuous <Continuous>  gabapentin 100 milliGRAM(s) Oral three times a day  glucagon  Injectable 1 milliGRAM(s) IntraMuscular once  glycopyrrolate 9 MICROgram(s)/formoterol 4.8 MICROgram(s) Inhaler 2 Puff(s) Inhalation two times a day  heparin   Injectable 5000 Unit(s) SubCutaneous every 8 hours  insulin glargine Injectable (LANTUS) 35 Unit(s) SubCutaneous at bedtime  insulin lispro (ADMELOG) corrective regimen sliding scale   SubCutaneous Before meals and at bedtime  insulin lispro Injectable (ADMELOG) 5 Unit(s) SubCutaneous three times a day before meals  milrinone Infusion 0.25 MICROgram(s)/kG/Min (4.19 mL/Hr) IV Continuous <Continuous>  pantoprazole    Tablet 40 milliGRAM(s) Oral before breakfast  polyethylene glycol 3350 17 Gram(s) Oral daily  senna 2 Tablet(s) Oral at bedtime  sodium chloride 0.9%. 1000 milliLiter(s) (10 mL/Hr) IV Continuous <Continuous>    MEDICATIONS  (PRN):  dextrose Oral Gel 15 Gram(s) Oral once PRN Blood Glucose LESS THAN 70 milliGRAM(s)/deciliter  oxyCODONE    IR 5 milliGRAM(s) Oral every 6 hours PRN Moderate Pain (4 - 6)  oxyCODONE    IR 10 milliGRAM(s) Oral every 6 hours PRN Severe Pain (7 - 10)       PROBLEM LIST/ ASSESSMENT:  HEALTH ISSUES - PROBLEM Dx:  Aortic stenosis, severe    HTN (hypertension)    Hyperlipidemia    Type 2 diabetes mellitus    Right ventricular systolic dysfunction without heart failure    Bilateral pleural effusion        ,   Patient is a 73y old  Female who presents with a chief complaint of severe AS (25 Mar 2024 18:57)     s/p cardiac surgery    Acute systolic and diastolic heart failure evidenced by SOB and parenchymal infiltrates; will treat with diuresis    Cardiogenic shock on ionotropy      Acute blood loss anemia with relative hypotension treated with > 1 unit PC    Acute post operative pulmonary insufficiency ruled in due to hypoxemia, O2 sats < 91% on RA treated with HFNC      Acidosis evidenced by anion gap and negative base excess      My plan includes :  close hemodynamic, ventilatory and drain monitoring and management per post op routine    Monitor for arrhythmias and monitor parameters for organ perfusion  beta blockade not administered due to hemodynamic instability and bradycardia  monitor neurologic status  Head of the bed should remain elevated to 45 deg .   chest PT and IS will be encouraged  monitor adequacy of oxygenation and ventilation and attempt to wean oxygen  antibiotic regimen will be tailored to the clinical, laboratory and microbiologic data  Nutritional goals will be met using po eventually , ensure adequate caloric intake and montior the same  Stress ulcer and VTE prophylaxis will be achieved    Glycemic control is satisfactory  Electrolytes have been repleted as necessary and wound care has been carried out. Pain control has been achieved.   agressive physical therapy and early mobility and ambulation goals will be met   The family was updated about the course and plan  CRITICAL CARE TIME Upon my evaluation, this patient had a high probability of imminent or life-threatening deterioration due to the above problems which required my direct attention, intervention, and personal management.  I have personally provided 150 minutes of critical care time exclusive of time spent on separately billable procedures. Time included review of laboratory data, radiology results, discussion with consultants, and monitoring for potential decompensation. Interventions were performed as documented abovepersonally provided by me  in evaluation and management, reassessments, review and interpretation of labs and x-rays, ventilator and hemodynamic management, formulating a plan and coordinating care: ___150___ MIN.  Time does not include procedural time.    CTICU ATTENDING     					    Axel Sinha MD

## 2024-03-25 NOTE — PROCEDURE NOTE - NSPROCDETAILS_GEN_ALL_CORE
positive blood return obtained via catheter/connected to a pressurized flush line/sutured in place/all materials/supplies accounted for at end of procedure
Seldinger technique/dressing applied/secured in place/sterile dressing applied/percutaneous/ultrasound assessment of fluid (location)
guidewire recovered/lumen(s) aspirated and flushed/sterile dressing applied/sterile technique, catheter placed/ultrasound guidance with use of sterile gel and probe cove
Seldinger technique

## 2024-03-25 NOTE — PROCEDURE NOTE - NSPROCNAME_GEN_A_CORE
Central Line Insertion
Point of Care Ultrasound Lung
Arterial Puncture/Cannulation
Chest Tube
Chest Tube

## 2024-03-25 NOTE — PROGRESS NOTE ADULT - SUBJECTIVE AND OBJECTIVE BOX
Moderate sedation was performed by me using propofol midazolam and fentanyl  for the procedure of bronchy  continuous hemodynamic monitoring was performed   continuous monitoring of adequacy of oxygenation and ventilation was performed  patient remained stable thorughout the procedure  post - sedation monitoring of hemodynamics and oxygenation and ventilation was performed  Time required for moderate sedation was 30 minutes  time does not include proceural time or critical care time

## 2024-03-26 LAB
ALBUMIN SERPL ELPH-MCNC: 3.6 G/DL — SIGNIFICANT CHANGE UP (ref 3.3–5)
ALBUMIN SERPL ELPH-MCNC: 3.6 G/DL — SIGNIFICANT CHANGE UP (ref 3.3–5)
ALP SERPL-CCNC: 115 U/L — SIGNIFICANT CHANGE UP (ref 40–120)
ALP SERPL-CCNC: 123 U/L — HIGH (ref 40–120)
ALT FLD-CCNC: 107 U/L — HIGH (ref 10–45)
ALT FLD-CCNC: 120 U/L — HIGH (ref 10–45)
ANION GAP SERPL CALC-SCNC: 10 MMOL/L — SIGNIFICANT CHANGE UP (ref 5–17)
ANION GAP SERPL CALC-SCNC: 11 MMOL/L — SIGNIFICANT CHANGE UP (ref 5–17)
APTT BLD: 30.4 SEC — SIGNIFICANT CHANGE UP (ref 24.5–35.6)
APTT BLD: 31.3 SEC — SIGNIFICANT CHANGE UP (ref 24.5–35.6)
AST SERPL-CCNC: 54 U/L — HIGH (ref 10–40)
AST SERPL-CCNC: 63 U/L — HIGH (ref 10–40)
BASE EXCESS BLDV CALC-SCNC: 5.8 MMOL/L — HIGH (ref -2–3)
BASE EXCESS BLDV CALC-SCNC: 8 MMOL/L — HIGH (ref -2–3)
BASOPHILS # BLD AUTO: 0.03 K/UL — SIGNIFICANT CHANGE UP (ref 0–0.2)
BASOPHILS # BLD AUTO: 0.05 K/UL — SIGNIFICANT CHANGE UP (ref 0–0.2)
BASOPHILS NFR BLD AUTO: 0.2 % — SIGNIFICANT CHANGE UP (ref 0–2)
BASOPHILS NFR BLD AUTO: 0.3 % — SIGNIFICANT CHANGE UP (ref 0–2)
BILIRUB SERPL-MCNC: 0.7 MG/DL — SIGNIFICANT CHANGE UP (ref 0.2–1.2)
BILIRUB SERPL-MCNC: 0.7 MG/DL — SIGNIFICANT CHANGE UP (ref 0.2–1.2)
BUN SERPL-MCNC: 15 MG/DL — SIGNIFICANT CHANGE UP (ref 7–23)
BUN SERPL-MCNC: 16 MG/DL — SIGNIFICANT CHANGE UP (ref 7–23)
CALCIUM SERPL-MCNC: 9.3 MG/DL — SIGNIFICANT CHANGE UP (ref 8.4–10.5)
CALCIUM SERPL-MCNC: 9.4 MG/DL — SIGNIFICANT CHANGE UP (ref 8.4–10.5)
CHLORIDE SERPL-SCNC: 103 MMOL/L — SIGNIFICANT CHANGE UP (ref 96–108)
CHLORIDE SERPL-SCNC: 107 MMOL/L — SIGNIFICANT CHANGE UP (ref 96–108)
CO2 BLDV-SCNC: 32 MMOL/L — HIGH (ref 22–26)
CO2 BLDV-SCNC: 34.2 MMOL/L — HIGH (ref 22–26)
CO2 SERPL-SCNC: 28 MMOL/L — SIGNIFICANT CHANGE UP (ref 22–31)
CO2 SERPL-SCNC: 28 MMOL/L — SIGNIFICANT CHANGE UP (ref 22–31)
CREAT SERPL-MCNC: 0.71 MG/DL — SIGNIFICANT CHANGE UP (ref 0.5–1.3)
CREAT SERPL-MCNC: 0.77 MG/DL — SIGNIFICANT CHANGE UP (ref 0.5–1.3)
EGFR: 82 ML/MIN/1.73M2 — SIGNIFICANT CHANGE UP
EGFR: 91 ML/MIN/1.73M2 — SIGNIFICANT CHANGE UP
EOSINOPHIL # BLD AUTO: 0.13 K/UL — SIGNIFICANT CHANGE UP (ref 0–0.5)
EOSINOPHIL # BLD AUTO: 0.23 K/UL — SIGNIFICANT CHANGE UP (ref 0–0.5)
EOSINOPHIL NFR BLD AUTO: 0.8 % — SIGNIFICANT CHANGE UP (ref 0–6)
EOSINOPHIL NFR BLD AUTO: 1.6 % — SIGNIFICANT CHANGE UP (ref 0–6)
GAS PNL BLDA: SIGNIFICANT CHANGE UP
GAS PNL BLDA: SIGNIFICANT CHANGE UP
GAS PNL BLDV: SIGNIFICANT CHANGE UP
GAS PNL BLDV: SIGNIFICANT CHANGE UP
GLUCOSE BLDC GLUCOMTR-MCNC: 100 MG/DL — HIGH (ref 70–99)
GLUCOSE BLDC GLUCOMTR-MCNC: 106 MG/DL — HIGH (ref 70–99)
GLUCOSE BLDC GLUCOMTR-MCNC: 111 MG/DL — HIGH (ref 70–99)
GLUCOSE BLDC GLUCOMTR-MCNC: 184 MG/DL — HIGH (ref 70–99)
GLUCOSE BLDC GLUCOMTR-MCNC: 66 MG/DL — LOW (ref 70–99)
GLUCOSE BLDC GLUCOMTR-MCNC: 72 MG/DL — SIGNIFICANT CHANGE UP (ref 70–99)
GLUCOSE SERPL-MCNC: 103 MG/DL — HIGH (ref 70–99)
GLUCOSE SERPL-MCNC: 79 MG/DL — SIGNIFICANT CHANGE UP (ref 70–99)
HCO3 BLDV-SCNC: 31 MMOL/L — HIGH (ref 22–29)
HCO3 BLDV-SCNC: 33 MMOL/L — HIGH (ref 22–29)
HCT VFR BLD CALC: 26.6 % — LOW (ref 34.5–45)
HCT VFR BLD CALC: 27.1 % — LOW (ref 34.5–45)
HGB BLD-MCNC: 8.6 G/DL — LOW (ref 11.5–15.5)
HGB BLD-MCNC: 8.7 G/DL — LOW (ref 11.5–15.5)
IMM GRANULOCYTES NFR BLD AUTO: 0.4 % — SIGNIFICANT CHANGE UP (ref 0–0.9)
IMM GRANULOCYTES NFR BLD AUTO: 0.8 % — SIGNIFICANT CHANGE UP (ref 0–0.9)
INR BLD: 1.17 — SIGNIFICANT CHANGE UP (ref 0.85–1.18)
INR BLD: 1.24 — HIGH (ref 0.85–1.18)
LACTATE SERPL-SCNC: 0.7 MMOL/L — SIGNIFICANT CHANGE UP (ref 0.5–2)
LACTATE SERPL-SCNC: 0.8 MMOL/L — SIGNIFICANT CHANGE UP (ref 0.5–2)
LYMPHOCYTES # BLD AUTO: 0.82 K/UL — LOW (ref 1–3.3)
LYMPHOCYTES # BLD AUTO: 0.86 K/UL — LOW (ref 1–3.3)
LYMPHOCYTES # BLD AUTO: 5.5 % — LOW (ref 13–44)
LYMPHOCYTES # BLD AUTO: 5.7 % — LOW (ref 13–44)
MAGNESIUM SERPL-MCNC: 2.1 MG/DL — SIGNIFICANT CHANGE UP (ref 1.6–2.6)
MAGNESIUM SERPL-MCNC: 2.1 MG/DL — SIGNIFICANT CHANGE UP (ref 1.6–2.6)
MCHC RBC-ENTMCNC: 27.2 PG — SIGNIFICANT CHANGE UP (ref 27–34)
MCHC RBC-ENTMCNC: 27.5 PG — SIGNIFICANT CHANGE UP (ref 27–34)
MCHC RBC-ENTMCNC: 32.1 GM/DL — SIGNIFICANT CHANGE UP (ref 32–36)
MCHC RBC-ENTMCNC: 32.3 GM/DL — SIGNIFICANT CHANGE UP (ref 32–36)
MCV RBC AUTO: 84.7 FL — SIGNIFICANT CHANGE UP (ref 80–100)
MCV RBC AUTO: 85 FL — SIGNIFICANT CHANGE UP (ref 80–100)
MONOCYTES # BLD AUTO: 1.25 K/UL — HIGH (ref 0–0.9)
MONOCYTES # BLD AUTO: 1.34 K/UL — HIGH (ref 0–0.9)
MONOCYTES NFR BLD AUTO: 8 % — SIGNIFICANT CHANGE UP (ref 2–14)
MONOCYTES NFR BLD AUTO: 9.2 % — SIGNIFICANT CHANGE UP (ref 2–14)
NEUTROPHILS # BLD AUTO: 11.97 K/UL — HIGH (ref 1.8–7.4)
NEUTROPHILS # BLD AUTO: 13.23 K/UL — HIGH (ref 1.8–7.4)
NEUTROPHILS NFR BLD AUTO: 82.5 % — HIGH (ref 43–77)
NEUTROPHILS NFR BLD AUTO: 85 % — HIGH (ref 43–77)
NRBC # BLD: 0 /100 WBCS — SIGNIFICANT CHANGE UP (ref 0–0)
NRBC # BLD: 0 /100 WBCS — SIGNIFICANT CHANGE UP (ref 0–0)
PCO2 BLDV: 44 MMHG — HIGH (ref 39–42)
PCO2 BLDV: 45 MMHG — HIGH (ref 39–42)
PH BLDV: 7.45 — HIGH (ref 7.32–7.43)
PH BLDV: 7.47 — HIGH (ref 7.32–7.43)
PHOSPHATE SERPL-MCNC: 3.9 MG/DL — SIGNIFICANT CHANGE UP (ref 2.5–4.5)
PHOSPHATE SERPL-MCNC: 4 MG/DL — SIGNIFICANT CHANGE UP (ref 2.5–4.5)
PLATELET # BLD AUTO: 375 K/UL — SIGNIFICANT CHANGE UP (ref 150–400)
PLATELET # BLD AUTO: 406 K/UL — HIGH (ref 150–400)
PO2 BLDV: 34 MMHG — SIGNIFICANT CHANGE UP (ref 25–45)
PO2 BLDV: 42 MMHG — SIGNIFICANT CHANGE UP (ref 25–45)
POTASSIUM SERPL-MCNC: 4 MMOL/L — SIGNIFICANT CHANGE UP (ref 3.5–5.3)
POTASSIUM SERPL-MCNC: 4.2 MMOL/L — SIGNIFICANT CHANGE UP (ref 3.5–5.3)
POTASSIUM SERPL-SCNC: 4 MMOL/L — SIGNIFICANT CHANGE UP (ref 3.5–5.3)
POTASSIUM SERPL-SCNC: 4.2 MMOL/L — SIGNIFICANT CHANGE UP (ref 3.5–5.3)
PROT SERPL-MCNC: 5.9 G/DL — LOW (ref 6–8.3)
PROT SERPL-MCNC: 6.1 G/DL — SIGNIFICANT CHANGE UP (ref 6–8.3)
PROTHROM AB SERPL-ACNC: 13.3 SEC — HIGH (ref 9.5–13)
PROTHROM AB SERPL-ACNC: 14 SEC — HIGH (ref 9.5–13)
RBC # BLD: 3.13 M/UL — LOW (ref 3.8–5.2)
RBC # BLD: 3.2 M/UL — LOW (ref 3.8–5.2)
RBC # FLD: 16.3 % — HIGH (ref 10.3–14.5)
RBC # FLD: 16.3 % — HIGH (ref 10.3–14.5)
SAO2 % BLDV: 64.3 % — LOW (ref 67–88)
SAO2 % BLDV: 67.3 % — SIGNIFICANT CHANGE UP (ref 67–88)
SODIUM SERPL-SCNC: 141 MMOL/L — SIGNIFICANT CHANGE UP (ref 135–145)
SODIUM SERPL-SCNC: 146 MMOL/L — HIGH (ref 135–145)
WBC # BLD: 14.5 K/UL — HIGH (ref 3.8–10.5)
WBC # BLD: 15.59 K/UL — HIGH (ref 3.8–10.5)
WBC # FLD AUTO: 14.5 K/UL — HIGH (ref 3.8–10.5)
WBC # FLD AUTO: 15.59 K/UL — HIGH (ref 3.8–10.5)

## 2024-03-26 PROCEDURE — 99232 SBSQ HOSP IP/OBS MODERATE 35: CPT

## 2024-03-26 PROCEDURE — 71045 X-RAY EXAM CHEST 1 VIEW: CPT | Mod: 26

## 2024-03-26 PROCEDURE — 99291 CRITICAL CARE FIRST HOUR: CPT

## 2024-03-26 RX ORDER — DEXTROSE 50 % IN WATER 50 %
15 SYRINGE (ML) INTRAVENOUS ONCE
Refills: 0 | Status: COMPLETED | OUTPATIENT
Start: 2024-03-26 | End: 2024-03-26

## 2024-03-26 RX ORDER — POTASSIUM CHLORIDE 20 MEQ
40 PACKET (EA) ORAL ONCE
Refills: 0 | Status: COMPLETED | OUTPATIENT
Start: 2024-03-26 | End: 2024-03-26

## 2024-03-26 RX ORDER — MILRINONE LACTATE 1 MG/ML
0.12 INJECTION, SOLUTION INTRAVENOUS
Qty: 20 | Refills: 0 | Status: DISCONTINUED | OUTPATIENT
Start: 2024-03-26 | End: 2024-03-27

## 2024-03-26 RX ORDER — FUROSEMIDE 40 MG
20 TABLET ORAL ONCE
Refills: 0 | Status: COMPLETED | OUTPATIENT
Start: 2024-03-26 | End: 2024-03-26

## 2024-03-26 RX ORDER — INSULIN GLARGINE 100 [IU]/ML
8 INJECTION, SOLUTION SUBCUTANEOUS AT BEDTIME
Refills: 0 | Status: DISCONTINUED | OUTPATIENT
Start: 2024-03-26 | End: 2024-03-27

## 2024-03-26 RX ORDER — FUROSEMIDE 40 MG
20 TABLET ORAL
Refills: 0 | Status: DISCONTINUED | OUTPATIENT
Start: 2024-03-26 | End: 2024-03-27

## 2024-03-26 RX ADMIN — Medication 5 UNIT(S): at 17:15

## 2024-03-26 RX ADMIN — Medication 5 UNIT(S): at 07:49

## 2024-03-26 RX ADMIN — GABAPENTIN 100 MILLIGRAM(S): 400 CAPSULE ORAL at 06:42

## 2024-03-26 RX ADMIN — SENNA PLUS 2 TABLET(S): 8.6 TABLET ORAL at 21:43

## 2024-03-26 RX ADMIN — POLYETHYLENE GLYCOL 3350 17 GRAM(S): 17 POWDER, FOR SOLUTION ORAL at 11:31

## 2024-03-26 RX ADMIN — PANTOPRAZOLE SODIUM 40 MILLIGRAM(S): 20 TABLET, DELAYED RELEASE ORAL at 06:42

## 2024-03-26 RX ADMIN — HEPARIN SODIUM 5000 UNIT(S): 5000 INJECTION INTRAVENOUS; SUBCUTANEOUS at 21:43

## 2024-03-26 RX ADMIN — HEPARIN SODIUM 5000 UNIT(S): 5000 INJECTION INTRAVENOUS; SUBCUTANEOUS at 13:24

## 2024-03-26 RX ADMIN — OXYCODONE HYDROCHLORIDE 10 MILLIGRAM(S): 5 TABLET ORAL at 22:09

## 2024-03-26 RX ADMIN — Medication 2: at 11:54

## 2024-03-26 RX ADMIN — AZITHROMYCIN 250 MILLIGRAM(S): 500 TABLET, FILM COATED ORAL at 11:31

## 2024-03-26 RX ADMIN — MILRINONE LACTATE 2.1 MICROGRAM(S)/KG/MIN: 1 INJECTION, SOLUTION INTRAVENOUS at 14:00

## 2024-03-26 RX ADMIN — Medication 20 MILLIGRAM(S): at 14:00

## 2024-03-26 RX ADMIN — GLYCOPYRROLATE AND FORMOTEROL FUMARATE 2 PUFF(S): 9; 4.8 AEROSOL, METERED RESPIRATORY (INHALATION) at 09:10

## 2024-03-26 RX ADMIN — Medication 15 GRAM(S): at 21:43

## 2024-03-26 RX ADMIN — CHLORHEXIDINE GLUCONATE 1 APPLICATION(S): 213 SOLUTION TOPICAL at 06:42

## 2024-03-26 RX ADMIN — Medication 20 MILLIGRAM(S): at 19:31

## 2024-03-26 RX ADMIN — Medication 81 MILLIGRAM(S): at 11:31

## 2024-03-26 RX ADMIN — MILRINONE LACTATE 4.19 MICROGRAM(S)/KG/MIN: 1 INJECTION, SOLUTION INTRAVENOUS at 11:32

## 2024-03-26 RX ADMIN — GABAPENTIN 100 MILLIGRAM(S): 400 CAPSULE ORAL at 13:24

## 2024-03-26 RX ADMIN — CEFTRIAXONE 100 MILLIGRAM(S): 500 INJECTION, POWDER, FOR SOLUTION INTRAMUSCULAR; INTRAVENOUS at 11:32

## 2024-03-26 RX ADMIN — OXYCODONE HYDROCHLORIDE 10 MILLIGRAM(S): 5 TABLET ORAL at 23:00

## 2024-03-26 RX ADMIN — HEPARIN SODIUM 5000 UNIT(S): 5000 INJECTION INTRAVENOUS; SUBCUTANEOUS at 06:42

## 2024-03-26 RX ADMIN — GLYCOPYRROLATE AND FORMOTEROL FUMARATE 2 PUFF(S): 9; 4.8 AEROSOL, METERED RESPIRATORY (INHALATION) at 21:44

## 2024-03-26 RX ADMIN — Medication 5 UNIT(S): at 11:54

## 2024-03-26 RX ADMIN — AMIODARONE HYDROCHLORIDE 200 MILLIGRAM(S): 400 TABLET ORAL at 06:42

## 2024-03-26 NOTE — OCCUPATIONAL THERAPY INITIAL EVALUATION ADULT - PERTINENT HX OF CURRENT PROBLEM, REHAB EVAL
74 y/o female PMH DM, severe AS, EF 60%, HTN, HLD, anemia who was admitted to [facility] with syncope. CT negative for CVA. subsequent echo showed severe AS. Transferred to [facility] under Dr. Moon for evaluation for TAVR vs BAV.

## 2024-03-26 NOTE — OCCUPATIONAL THERAPY INITIAL EVALUATION ADULT - MD ORDER
Severe Aortic Stenosis  Transferred for evaluation for TAVR vs BAV  Now s/p Aortic root replacement (AVR) on 3/19/24

## 2024-03-26 NOTE — OCCUPATIONAL THERAPY INITIAL EVALUATION ADULT - GENERAL OBSERVATIONS, REHAB EVAL
Pt's RN Cinthia aware of intent to eval/tx; cleared Pt. Pt received in recliner - +02NC 6L, telemetry, radial a-line, chest tubes x3 (off suction per RN for activities), trent, TPM, TLC, heplock, sternal incision intact. PT Brandi present. Pt agreeable to eval.

## 2024-03-26 NOTE — OCCUPATIONAL THERAPY INITIAL EVALUATION ADULT - ADDITIONAL COMMENTS
Pt states that she lives alone in 4th floor walk up apt. Pt reports that she was independent prior to admission w/o prior use of AEs/DMEs.

## 2024-03-26 NOTE — PROGRESS NOTE ADULT - SUBJECTIVE AND OBJECTIVE BOX
INTERVAL HPI/OVERNIGHT EVENTS:    3/19:  Bio Bental  EF normal/hyperdynamic    72yo Female Hx DM, severe AS, HTN, HLD, anemia presenting to [facility] with syncope.    CT Head - negative  ECHO: severe AS. Transferred to [facility] for intervention - possible TAVR vs BAV.     not candidate for perc intervention due to anatomy    3/19 - OR - intraop: 2.8 L LR/4 U pRBC/5 cryo/2 FFP/1 platelet  3/20 - dobutamine started (elevated LA) with improvement ; primacor added and Extubated    3/21 - Fib noted -  titrated off; milrinone 0.25  amio started and milrinone titrated down ; Abx started for tracheobronchitis    3/22: ongoing pAFib - amio and metoprolol -   3/23 - bradycardic - drop in UO and rising LA/LFT - concern for low output state  pacing inc (inc HR) and amio/metoprolol held -  trent placed  ECHO obtained - contacted by ECHO reporting RV down     Inotropic support started ; anemia addresed with transfusion support     right pigtail placed - 900 cc out;  Left pigtail placed 600     overall improvement - LA normal; making urine     ICU Vital Signs Last 24 Hrs  T(C): 36.3 (26 Mar 2024 09:18), Max: 36.7 (26 Mar 2024 05:17)  T(F): 97.3 (26 Mar 2024 09:18), Max: 98.1 (26 Mar 2024 05:17)  HR: 87 (26 Mar 2024 12:00) (86 - 130) sinus   BP: 121/56 (26 Mar 2024 12:00) (115/55 - 127/59)  BP(mean): 80 (26 Mar 2024 12:00) (79 - 85)  ABP: 139/55 (26 Mar 2024 12:00) (96/57 - 148/52)  ABP(mean): 79 (26 Mar 2024 12:00) (57 - 85)  RR: 20 (26 Mar 2024 10:00) (15 - 20)  SpO2: 99% (26 Mar 2024 12:00) (95% - 100%) HFNC     Qtts:     I&O's Summary    25 Mar 2024 07:01  -  26 Mar 2024 07:00  --------------------------------------------------------  IN: 1869.7 mL / OUT: 3365 mL / NET: -1495.3 mL    26 Mar 2024 07:01  -  26 Mar 2024 12:18  --------------------------------------------------------  IN: 190.9 mL / OUT: 290 mL / NET: -99.1 mL    Physical Exam    Heart - regular (-)rub/gallop  Lungs - dec BS base - no rhonchi/wheeze  Abd - (+)BS soft NTND (-)r/r/g  Ext - warm to touch; no cyanosis/clubbing  Chest - incision site clean adn dry   Neuro - alert/oriented and interactive - nonfocal   Skin - no rash     LABS:                        8.6    15.59 )-----------( 375      ( 26 Mar 2024 02:57 )             26.6     03-26    146<H>  |  107  |  16  ----------------------------<  79  4.2   |  28  |  0.77    Ca    9.3      26 Mar 2024 02:57  Phos  4.0       Mg     2.1         TPro  5.9<L>  /  Alb  3.6  /  TBili  0.7  /  DBili  x   /  AST  63<H>  /  ALT  120<H>  /  AlkPhos  123<H>  03-26    PT/INR - ( 26 Mar 2024 02:57 )   PT: 14.0 sec;   INR: 1.24     PTT - ( 26 Mar 2024 02:57 )  PTT:31.3 sec    ABG - ( 26 Mar 2024 03:27 )  pH, Arterial: 7.50  pH, Blood: x     /  pCO2: 36    /  pO2: 110   / HCO3: 28    / Base Excess: 4.9   /  SaO2: 99.1      RADIOLOGY & ADDITIONAL STUDIES:    Patient with severe symptomatic aortic stenosis s/p Bio Bental - now POD#2 - post-op cardiogenic shock improving - slowly titrated off inotroic support - placed on Amio and metoprolol for atrial fib - noted relative hypotension this am SBP- 80-90. with concern of hypoperfusion - inc LA/LFT and no UO  - now improved with colloid resuscitation and inotrope    1. CV  Amio and BB held - pacer inc HR to inc CO 3/23  pacer now on backup   restart amio at 200 qd to avoid re-emergence of fib; hold BB and avoid hypotension  ECHO 3/23: s/p AVR and right ventricle appears mildly reduced.  restarted inotropic -  5 - now dec to 3  ASA    2. Pulm   incentive spirometry/ambulation with staff assist  titrate supplemental oxygen down to off -   tap on right 3/23 - plan tap no left today - sizable effusion   Abx started for tracheobronchitis - transition to zpak; short course CTX now #4 - d/c   Sputum 3 - normal olayinka     3. Endocrine  poor glycemic control - improved; /106  endocrine following    contains dextose as well     d/w patient/staff and CTS         I have spent/provided stated minutes of critical care time to this patient:  INTERVAL HPI/OVERNIGHT EVENTS:    3/19:  Bio Bental  EF normal/hyperdynamic    74yo Female Hx DM, severe AS, HTN, HLD, anemia presenting to [facility] with syncope.    CT Head - negative  ECHO: severe AS. Transferred to [facility] for intervention - possible TAVR vs BAV.     not candidate for perc intervention due to anatomy    3/19 - OR - intraop: 2.8 L LR/4 U pRBC/5 cryo/2 FFP/1 platelet  3/20 - dobutamine started (elevated LA) with improvement ; primacor added and Extubated    3/21 - Fib noted -  titrated off; milrinone 0.25  amio started and milrinone titrated down ; Abx started for tracheobronchitis    3/22: ongoing pAFib - amio and metoprolol -   3/23 - bradycardic - drop in UO and rising LA/LFT - concern for low output state  pacing inc (inc HR) and amio/metoprolol held -  trent placed  ECHO obtained - contacted by ECHO reporting RV down     Inotropic support started ; anemia addresed with transfusion support     right pigtail placed - 900 cc out;  Left pigtail placed 600   overall improvement - LA normal; making urine   3/24 - Abx de-escalated based on bronch specimen - normal olayinka -   alternating  fib/sinus     ceftriaxone redosed - now 5 days given - d/c     3/25:   new lines; swan replaced   /primacor     ECHO - Normal left and right ventricular size and systolic function. A bioprosthetic valve noted in the aortic position. The peak transvalvular velocity is 2.73 m/s, the mean transvalvular gradient is   11.00 mmHg, and the LVOT/AV velocity ratio is 0.51. The peak transaortic gradient is 29.81 mmHg. There is no evidence of aortic regurgitation.  No pericardial effusion.    currently in sinus     ICU Vital Signs Last 24 Hrs  T(C): 36.3 (26 Mar 2024 09:18), Max: 36.7 (26 Mar 2024 05:17)  T(F): 97.3 (26 Mar 2024 09:18), Max: 98.1 (26 Mar 2024 05:17)  HR: 87 (26 Mar 2024 12:00) (86 - 130) sinus   BP: 121/56 (26 Mar 2024 12:00) (115/55 - 127/59)  BP(mean): 80 (26 Mar 2024 12:00) (79 - 85)  ABP: 139/55 (26 Mar 2024 12:00) (96/57 - 148/52)  ABP(mean): 79 (26 Mar 2024 12:00) (57 - 85)  RR: 20 (26 Mar 2024 10:00) (15 - 20)  SpO2: 99% (26 Mar 2024 12:00) (95% - 100%) HFNC   CI 3.6    Qtts:    1  Primacor 0.25    I&O's Summary    25 Mar 2024 07:01  -  26 Mar 2024 07:00  --------------------------------------------------------  IN: 1869.7 mL / OUT: 3365 mL / NET: -1495.3 mL    26 Mar 2024 07:01  -  26 Mar 2024 12:18  --------------------------------------------------------  IN: 190.9 mL / OUT: 290 mL / NET: -99.1 mL    Physical Exam    Heart - regular (-)rub/gallop  Lungs - dec BS base - no rhonchi/wheeze  Abd - (+)BS soft NTND (-)r/r/g  Ext - warm to touch; no cyanosis/clubbing  Chest - incision site clean and dry   Neuro - alert/oriented and interactive - nonfocal   Skin - no rash     LABS:                        8.6    15.59 )-----------( 375      ( 26 Mar 2024 02:57 )             26.6     03-26    146<H>  |  107  |  16  ----------------------------<  79  4.2   |  28  |  0.77    Ca    9.3      26 Mar 2024 02:57  Phos  4.0     03-26  Mg     2.1         TPro  5.9<L>  /  Alb  3.6  /  TBili  0.7  /  DBili  x   /  AST  63<H>  /  ALT  120<H>  /  AlkPhos  123<H>      PT/INR - ( 26 Mar 2024 02:57 )   PT: 14.0 sec;   INR: 1.24     PTT - ( 26 Mar 2024 02:57 )  PTT:31.3 sec    ABG - ( 26 Mar 2024 03:27 )  pH, Arterial: 7.50  pH, Blood: x     /  pCO2: 36    /  pO2: 110   / HCO3: 28    / Base Excess: 4.9   /  SaO2: 99.1      RADIOLOGY & ADDITIONAL STUDIES:    Patient with severe symptomatic aortic stenosis s/p Bio Bental - now POD#2 - post-op cardiogenic shock improving - slowly titrated off inotroic support - placed on Amio and metoprolol for atrial fib - noted relative hypotension this am SBP- 80-90. with concern of hypoperfusion - inc LA/LFT and no UO  - now improved with colloid resuscitation and inotrope    1. CV  Amio and BB held - pacer inc HR to inc CO 3/23  pacer now on backup   restart amio at 200 qd to avoid re-emergence of fib; hold BB and avoid hypotension  ECHO 3/23: s/p AVR and right ventricle appears mildly reduced.   1  ASA    2. Pulm   incentive spirometry/ambulation with staff assist  titrate supplemental oxygen down to off -   tap on right 3/23 - plan tap no left today - sizable effusion   Abx started for tracheobronchitis - transition to zpak; short course CTX now #4 - d/c   Sputum 3/21 - normal olayinka     3. Endocrine  poor glycemic control - improved; /106  endocrine following    contains dextose as well     d/w patient/staff and CTS         I have spent/provided stated minutes of critical care time to this patient:  INTERVAL HPI/OVERNIGHT EVENTS:    3/19:  Bio Bental  EF normal/hyperdynamic    74yo Female Hx DM, severe AS, HTN, HLD, anemia presenting to [facility] with syncope.    CT Head - negative  ECHO: severe AS. Transferred to [facility] for intervention - possible TAVR vs BAV.     not candidate for perc intervention due to anatomy    3/19 - OR - intraop: 2.8 L LR/4 U pRBC/5 cryo/2 FFP/1 platelet  3/20 - dobutamine started (elevated LA) with improvement ; primacor added and Extubated    3/21 - Fib noted -  titrated off; milrinone 0.25  amio started and milrinone titrated down ; Abx started for tracheobronchitis    3/22: ongoing pAFib - amio and metoprolol -   3/23 - bradycardic - drop in UO and rising LA/LFT - concern for low output state  pacing inc (inc HR) and amio/metoprolol held -  trent placed  ECHO obtained - contacted by ECHO reporting RV down     Inotropic support started ; anemia addresed with transfusion support     right pigtail placed - 900 cc out;  Left pigtail placed 600   overall improvement - LA normal; making urine   3/24 - Abx de-escalated based on bronch specimen - normal olayinka -   alternating  fib/sinus     ceftriaxone redosed - now 5 days given - d/c     3/25:   new lines; swan replaced   /primacor     ECHO - Normal left and right ventricular size and systolic function. A bioprosthetic valve noted in the aortic position. The peak transvalvular velocity is 2.73 m/s, the mean transvalvular gradient is   11.00 mmHg, and the LVOT/AV velocity ratio is 0.51. The peak transaortic gradient is 29.81 mmHg. There is no evidence of aortic regurgitation.  No pericardial effusion.    currently in sinus   OOB in chair - remains on HFNC    No acute events reported overnight     ICU Vital Signs Last 24 Hrs  T(C): 36.3 (26 Mar 2024 09:18), Max: 36.7 (26 Mar 2024 05:17)  T(F): 97.3 (26 Mar 2024 09:18), Max: 98.1 (26 Mar 2024 05:17)  HR: 87 (26 Mar 2024 12:00) (86 - 130) sinus   BP: 121/56 (26 Mar 2024 12:00) (115/55 - 127/59)  BP(mean): 80 (26 Mar 2024 12:00) (79 - 85)  ABP: 139/55 (26 Mar 2024 12:00) (96/57 - 148/52)  ABP(mean): 79 (26 Mar 2024 12:00) (57 - 85)  RR: 20 (26 Mar 2024 10:00) (15 - 20)  SpO2: 99% (26 Mar 2024 12:00) (95% - 100%) HFNC   CI 3.6    Qtts:    1  Primacor 0.25    I&O's Summary    25 Mar 2024 07:01  -  26 Mar 2024 07:00  --------------------------------------------------------  IN: 1869.7 mL / OUT: 3365 mL / NET: -1495.3 mL    26 Mar 2024 07:01  -  26 Mar 2024 12:18  --------------------------------------------------------  IN: 190.9 mL / OUT: 290 mL / NET: -99.1 mL    Physical Exam    Heart - regular (-)rub/gallop  Lungs - dec BS base - no rhonchi/wheeze  Abd - (+)BS soft NTND (-)r/r/g  Ext - warm to touch; no cyanosis/clubbing  Chest - incision site clean and dry   Neuro - alert/oriented and interactive - nonfocal   Skin - no rash     LABS:                        8.6    15.59 )-----------( 375      ( 26 Mar 2024 02:57 )             26.6     03-26    146<H>  |  107  |  16  ----------------------------<  79  4.2   |  28  |  0.77    Ca    9.3      26 Mar 2024 02:57  Phos  4.0     03-  Mg     2.1     -    TPro  5.9<L>  /  Alb  3.6  /  TBili  0.7  /  DBili  x   /  AST  63<H>  /  ALT  120<H>  /  AlkPhos  123<H>  -    PT/INR - ( 26 Mar 2024 02:57 )   PT: 14.0 sec;   INR: 1.24     PTT - ( 26 Mar 2024 02:57 )  PTT:31.3 sec    ABG - ( 26 Mar 2024 03:27 )  pH, Arterial: 7.50  pH, Blood: x     /  pCO2: 36    /  pO2: 110   / HCO3: 28    / Base Excess: 4.9   /  SaO2: 99.1      RADIOLOGY & ADDITIONAL STUDIES:    Patient with severe symptomatic aortic stenosis s/p Bio Bental - post-op cardiogenic shock improving - slowly titrated off inotroic support - placed on Amio and metoprolol for atrial fib - noted relative hypotension SBP- 80-90. with concern of hypoperfusion - inc LA/LFT and no UO  - now improved with transient pacing/colloid resuscitation and inotrope    1. CV  currently in sinus; paroxysmal fib   on amio infusion and po - hold infusion and cont po - has received load prior   judicious use of BB - prior prompted relative hypotension and low flow  CI 3.6 -  1 and primacor - d/c  and assess need for inotropic support   ECHO noted   pacer now on backup   ECHO 3/23: s/p AVR and right ventricle appears mildly reduced.  ASA  to d/w CTS systemic AC if fib recurs    2. Pulm   transition off HFNC  incentive spirometry/ambulation with staff assist  titrate supplemental oxygen down to off -   pigtails bilaterally for effusions -= monitor output ; assess for removal over time  Abx  completed for tracheobronchitis - bronch specmen - normal olayinka   Sputum 3/21 - normal olayinka     3. Endocrine  poor glycemic control - improved; /100  endocrine following     d/w patient/staff and CTS     I have spent/provided stated minutes of critical care time to this patient: 60

## 2024-03-26 NOTE — PROGRESS NOTE ADULT - PROBLEM SELECTOR PLAN 1
Type 2 diabetes mellitus with hyperglycemia  - Please decrease lantus to  units at bedtime.   - Decrease lispro to units with meals.  - Continue lispro moderate dose sliding scale before meals and at bedtime.  - Patient's fingerstick glucose goal is 100-180 mg/dL.    - Consistent carb diet. Order Glucerna.  - For discharge, patient can ***.    - Patient can follow up at discharge with [facility] by calling [phone] to make an appointment.      Discussed with Dr Huffman. Primary team updated. Type 2 diabetes mellitus with hyperglycemia  - Please decrease lantus to 8 units at bedtime.   - Continue lispro 5 units with meals.  - Continue lispro moderate dose sliding scale before meals and at bedtime.  - Patient's fingerstick glucose goal is 100-180 mg/dL.    - Consistent carb diet. Order Glucerna.  - For discharge, patient can ***.    - Patient can follow up at discharge with [facility] by calling [phone] to make an appointment.      Discussed with Dr Huffman. Primary team updated.

## 2024-03-26 NOTE — PROGRESS NOTE ADULT - ASSESSMENT
74 y/o female PMH DM, severe AS, EF 60%, HTN, HLD, anemia who was admitted to [facility] with syncope. CT negative for CVA, but echo showed severe AS s/p SAVR 3/19/2024.    Endocrine consulted for T2 diabetes management.     A1C: 9.6 %  C-peptide 1.8 with  - March 2024  BUN: 16  Creatinine: 0.77  GFR: 82  Weight: 55.9  BMI: 22.5  EF: 60%

## 2024-03-26 NOTE — PROGRESS NOTE ADULT - SUBJECTIVE AND OBJECTIVE BOX
SUBJECTIVE / INTERVAL HPI: Patient was seen and examined this morning. Eating somewhat better today. Still on HFNC with CT. Abx course complete. Started back on milrinone.    CAPILLARY BLOOD GLUCOSE & INSULIN RECEIVED  165 mg/dL (03-25 @ 02:42)  148 mg/dL (03-25 @ 07:41)  148 mg/dL (03-25 @ 09:49)  176 mg/dL (03-25 @ 10:55)  112 mg/dL (03-25 @ 13:44) - Lispro 15  81 mg/dL (03-25 @ 16:18)  77 mg/dL (03-25 @ 16:29)  86 mg/dL (03-25 @ 17:43)  59 mg/dL (03-25 @ 21:45)  74 mg/dL (03-25 @ 22:10)  99 mg/dL (03-25 @ 22:47) - Lantus 12  96 mg/dL (03-25 @ 23:35)  79 mg/dL (03-26 @ 02:57)  100 mg/dL (03-26 @ 06:32) - Lispro 5  184 mg/dL (03-26 @ 11:53)      REVIEW OF SYSTEMS  Constitutional:  Negative fever, chills or loss of appetite.  Eyes:  Negative blurry vision or double vision.  Cardiovascular:  Negative for chest pain or palpitations.  Respiratory:  Negative for cough, wheezing, or shortness of breath.    Gastrointestinal:  Negative for nausea, vomiting, diarrhea, constipation, or abdominal pain.  Genitourinary:  Negative frequency, urgency or dysuria.  Neurologic:  No headache, confusion, dizziness, lightheadedness.    PHYSICAL EXAM  Vital Signs Last 24 Hrs  T(C): 36.4 (26 Mar 2024 12:38), Max: 36.7 (26 Mar 2024 05:17)  T(F): 97.6 (26 Mar 2024 12:38), Max: 98.1 (26 Mar 2024 05:17)  HR: 89 (26 Mar 2024 13:00) (86 - 130)  BP: 120/58 (26 Mar 2024 13:00) (115/55 - 127/59)  BP(mean): 83 (26 Mar 2024 13:00) (79 - 85)  RR: 16 (26 Mar 2024 13:00) (15 - 20)  SpO2: 100% (26 Mar 2024 13:00) (95% - 100%)    Parameters below as of 26 Mar 2024 14:00  Patient On (Oxygen Delivery Method): nasal cannula, high flow    Constitutional: Awake, alert, in no acute distress.   HEENT: Normocephalic, atraumatic, GEORGETTE, no proptosis or lid retraction.   Neck: supple, no acanthosis, no thyromegaly or palpable thyroid nodules.  Respiratory: Lungs clear to ausculation bilaterally. + CT. HFNC  Cardiovascular: regular rhythm, normal S1 and S2, + murmur + sternotomy  GI: soft, non-tender, non-distended, bowel sounds present, no masses appreciated.  Extremities: No lower extremity edema, peripheral pulses present.   Skin: no rashes.   Psychiatric: AAO x 3. Normal affect/mood.     LABS  CBC - WBC/HGB/HTC/PLT: 15.59/8.6/26.6/375 (03-26-24)  BMP - Na/K/Cl/Bicarb/BUN/Cr/Gluc/AG/eGFR: 146/4.2/107/28/16/0.77/79/11/82 (03-26-24)  Ca - 9.3 (03-26-24)  Phos - 4.0 (03-26-24)  Mg - 2.1 (03-26-24)  LFT - Alb/Tprot/Tbili/Dbili/AlkPhos/ALT/AST: 3.6/--/0.7/--/123/120/63 (03-26-24)  PT/aPTT/INR: 14.0/31.3/1.24 (03-26-24)   Thyroid Stimulating Hormone, Serum: 1.530 (03-12-24)      MEDICATIONS  MEDICATIONS  (STANDING):  aMIOdarone    Tablet 200 milliGRAM(s) Oral daily  aspirin enteric coated 81 milliGRAM(s) Oral daily  chlorhexidine 2% Cloths 1 Application(s) Topical daily  dextrose 5%. 1000 milliLiter(s) (50 mL/Hr) IV Continuous <Continuous>  dextrose 5%. 1000 milliLiter(s) (100 mL/Hr) IV Continuous <Continuous>  dextrose 50% Injectable 25 Gram(s) IV Push once  dextrose 50% Injectable 50 milliLiter(s) IV Push every 15 minutes  dextrose 50% Injectable 25 milliLiter(s) IV Push every 15 minutes  gabapentin 100 milliGRAM(s) Oral three times a day  glucagon  Injectable 1 milliGRAM(s) IntraMuscular once  glycopyrrolate 9 MICROgram(s)/formoterol 4.8 MICROgram(s) Inhaler 2 Puff(s) Inhalation two times a day  heparin   Injectable 5000 Unit(s) SubCutaneous every 8 hours  insulin glargine Injectable (LANTUS) 12 Unit(s) SubCutaneous at bedtime  insulin lispro (ADMELOG) corrective regimen sliding scale   SubCutaneous Before meals and at bedtime  insulin lispro Injectable (ADMELOG) 5 Unit(s) SubCutaneous three times a day before meals  milrinone Infusion 0.125 MICROgram(s)/kG/Min (2.1 mL/Hr) IV Continuous <Continuous>  pantoprazole    Tablet 40 milliGRAM(s) Oral before breakfast  polyethylene glycol 3350 17 Gram(s) Oral daily  senna 2 Tablet(s) Oral at bedtime  sodium chloride 0.9%. 1000 milliLiter(s) (10 mL/Hr) IV Continuous <Continuous>    MEDICATIONS  (PRN):  dextrose Oral Gel 15 Gram(s) Oral once PRN Blood Glucose LESS THAN 70 milliGRAM(s)/deciliter  oxyCODONE    IR 5 milliGRAM(s) Oral every 6 hours PRN Moderate Pain (4 - 6)  oxyCODONE    IR 10 milliGRAM(s) Oral every 6 hours PRN Severe Pain (7 - 10)   SUBJECTIVE / INTERVAL HPI: Patient was seen and examined this morning. Eating somewhat better today. Still on HFNC with CT. Abx course complete. Started back on milrinone.    CAPILLARY BLOOD GLUCOSE & INSULIN RECEIVED  165 mg/dL (03-25 @ 02:42)  148 mg/dL (03-25 @ 07:41)  148 mg/dL (03-25 @ 09:49)  176 mg/dL (03-25 @ 10:55)  112 mg/dL (03-25 @ 13:44) - Lispro 15  81 mg/dL (03-25 @ 16:18)  77 mg/dL (03-25 @ 16:29)  86 mg/dL (03-25 @ 17:43)  59 mg/dL (03-25 @ 21:45)  74 mg/dL (03-25 @ 22:10)  99 mg/dL (03-25 @ 22:47) - Lantus 12  96 mg/dL (03-25 @ 23:35)  79 mg/dL (03-26 @ 02:57)  100 mg/dL (03-26 @ 06:32) - Lispro 5 Ate cornflakes.  184 mg/dL (03-26 @ 11:53) - Lispro 5+2. Ate about 1/3 of rice on plate.      REVIEW OF SYSTEMS  Constitutional:  Negative fever, chills or loss of appetite.  Eyes:  Negative blurry vision or double vision.  Cardiovascular:  Negative for chest pain or palpitations.  Respiratory:  Negative for cough, wheezing, or shortness of breath.    Gastrointestinal:  Negative for nausea, vomiting, diarrhea, constipation, or abdominal pain.  Genitourinary:  Negative frequency, urgency or dysuria.  Neurologic:  No headache, confusion, dizziness, lightheadedness.    PHYSICAL EXAM  Vital Signs Last 24 Hrs  T(C): 36.4 (26 Mar 2024 12:38), Max: 36.7 (26 Mar 2024 05:17)  T(F): 97.6 (26 Mar 2024 12:38), Max: 98.1 (26 Mar 2024 05:17)  HR: 89 (26 Mar 2024 13:00) (86 - 130)  BP: 120/58 (26 Mar 2024 13:00) (115/55 - 127/59)  BP(mean): 83 (26 Mar 2024 13:00) (79 - 85)  RR: 16 (26 Mar 2024 13:00) (15 - 20)  SpO2: 100% (26 Mar 2024 13:00) (95% - 100%)    Parameters below as of 26 Mar 2024 14:00  Patient On (Oxygen Delivery Method): nasal cannula, high flow    Constitutional: Awake, alert, in no acute distress.   HEENT: Normocephalic, atraumatic, GEORGETTE, no proptosis or lid retraction.   Neck: supple, no acanthosis, no thyromegaly or palpable thyroid nodules.  Respiratory: Lungs clear to ausculation bilaterally. + CT. HFNC  Cardiovascular: regular rhythm, normal S1 and S2, + murmur + sternotomy  GI: soft, non-tender, non-distended, bowel sounds present, no masses appreciated.  Extremities: No lower extremity edema, peripheral pulses present.   Skin: no rashes.   Psychiatric: AAO x 3. Normal affect/mood.     LABS  CBC - WBC/HGB/HTC/PLT: 15.59/8.6/26.6/375 (03-26-24)  BMP - Na/K/Cl/Bicarb/BUN/Cr/Gluc/AG/eGFR: 146/4.2/107/28/16/0.77/79/11/82 (03-26-24)  Ca - 9.3 (03-26-24)  Phos - 4.0 (03-26-24)  Mg - 2.1 (03-26-24)  LFT - Alb/Tprot/Tbili/Dbili/AlkPhos/ALT/AST: 3.6/--/0.7/--/123/120/63 (03-26-24)  PT/aPTT/INR: 14.0/31.3/1.24 (03-26-24)   Thyroid Stimulating Hormone, Serum: 1.530 (03-12-24)      MEDICATIONS  MEDICATIONS  (STANDING):  aMIOdarone    Tablet 200 milliGRAM(s) Oral daily  aspirin enteric coated 81 milliGRAM(s) Oral daily  chlorhexidine 2% Cloths 1 Application(s) Topical daily  dextrose 5%. 1000 milliLiter(s) (50 mL/Hr) IV Continuous <Continuous>  dextrose 5%. 1000 milliLiter(s) (100 mL/Hr) IV Continuous <Continuous>  dextrose 50% Injectable 25 Gram(s) IV Push once  dextrose 50% Injectable 50 milliLiter(s) IV Push every 15 minutes  dextrose 50% Injectable 25 milliLiter(s) IV Push every 15 minutes  gabapentin 100 milliGRAM(s) Oral three times a day  glucagon  Injectable 1 milliGRAM(s) IntraMuscular once  glycopyrrolate 9 MICROgram(s)/formoterol 4.8 MICROgram(s) Inhaler 2 Puff(s) Inhalation two times a day  heparin   Injectable 5000 Unit(s) SubCutaneous every 8 hours  insulin glargine Injectable (LANTUS) 12 Unit(s) SubCutaneous at bedtime  insulin lispro (ADMELOG) corrective regimen sliding scale   SubCutaneous Before meals and at bedtime  insulin lispro Injectable (ADMELOG) 5 Unit(s) SubCutaneous three times a day before meals  milrinone Infusion 0.125 MICROgram(s)/kG/Min (2.1 mL/Hr) IV Continuous <Continuous>  pantoprazole    Tablet 40 milliGRAM(s) Oral before breakfast  polyethylene glycol 3350 17 Gram(s) Oral daily  senna 2 Tablet(s) Oral at bedtime  sodium chloride 0.9%. 1000 milliLiter(s) (10 mL/Hr) IV Continuous <Continuous>    MEDICATIONS  (PRN):  dextrose Oral Gel 15 Gram(s) Oral once PRN Blood Glucose LESS THAN 70 milliGRAM(s)/deciliter  oxyCODONE    IR 5 milliGRAM(s) Oral every 6 hours PRN Moderate Pain (4 - 6)  oxyCODONE    IR 10 milliGRAM(s) Oral every 6 hours PRN Severe Pain (7 - 10)

## 2024-03-27 LAB
ADD ON TEST-SPECIMEN IN LAB: SIGNIFICANT CHANGE UP
ALBUMIN SERPL ELPH-MCNC: 3.5 G/DL — SIGNIFICANT CHANGE UP (ref 3.3–5)
ALBUMIN SERPL ELPH-MCNC: 3.6 G/DL — SIGNIFICANT CHANGE UP (ref 3.3–5)
ALP SERPL-CCNC: 102 U/L — SIGNIFICANT CHANGE UP (ref 40–120)
ALP SERPL-CCNC: 119 U/L — SIGNIFICANT CHANGE UP (ref 40–120)
ALT FLD-CCNC: 76 U/L — HIGH (ref 10–45)
ALT FLD-CCNC: 77 U/L — HIGH (ref 10–45)
ANION GAP SERPL CALC-SCNC: 10 MMOL/L — SIGNIFICANT CHANGE UP (ref 5–17)
ANION GAP SERPL CALC-SCNC: 12 MMOL/L — SIGNIFICANT CHANGE UP (ref 5–17)
APPEARANCE UR: ABNORMAL
APTT BLD: 28.9 SEC — SIGNIFICANT CHANGE UP (ref 24.5–35.6)
APTT BLD: 29.5 SEC — SIGNIFICANT CHANGE UP (ref 24.5–35.6)
APTT BLD: 32.9 SEC — SIGNIFICANT CHANGE UP (ref 24.5–35.6)
AST SERPL-CCNC: 34 U/L — SIGNIFICANT CHANGE UP (ref 10–40)
AST SERPL-CCNC: 41 U/L — HIGH (ref 10–40)
BACTERIA # UR AUTO: ABNORMAL /HPF
BASE EXCESS BLDV CALC-SCNC: 5.9 MMOL/L — HIGH (ref -2–3)
BASOPHILS # BLD AUTO: 0.04 K/UL — SIGNIFICANT CHANGE UP (ref 0–0.2)
BASOPHILS # BLD AUTO: 0.04 K/UL — SIGNIFICANT CHANGE UP (ref 0–0.2)
BASOPHILS NFR BLD AUTO: 0.3 % — SIGNIFICANT CHANGE UP (ref 0–2)
BASOPHILS NFR BLD AUTO: 0.4 % — SIGNIFICANT CHANGE UP (ref 0–2)
BILIRUB SERPL-MCNC: 0.6 MG/DL — SIGNIFICANT CHANGE UP (ref 0.2–1.2)
BILIRUB SERPL-MCNC: 0.6 MG/DL — SIGNIFICANT CHANGE UP (ref 0.2–1.2)
BILIRUB UR-MCNC: NEGATIVE — SIGNIFICANT CHANGE UP
BUN SERPL-MCNC: 15 MG/DL — SIGNIFICANT CHANGE UP (ref 7–23)
BUN SERPL-MCNC: 17 MG/DL — SIGNIFICANT CHANGE UP (ref 7–23)
CALCIUM SERPL-MCNC: 9.5 MG/DL — SIGNIFICANT CHANGE UP (ref 8.4–10.5)
CALCIUM SERPL-MCNC: 9.7 MG/DL — SIGNIFICANT CHANGE UP (ref 8.4–10.5)
CAST: 0 /LPF — SIGNIFICANT CHANGE UP (ref 0–4)
CHLORIDE SERPL-SCNC: 102 MMOL/L — SIGNIFICANT CHANGE UP (ref 96–108)
CHLORIDE SERPL-SCNC: 103 MMOL/L — SIGNIFICANT CHANGE UP (ref 96–108)
CO2 BLDV-SCNC: 30.9 MMOL/L — HIGH (ref 22–26)
CO2 SERPL-SCNC: 28 MMOL/L — SIGNIFICANT CHANGE UP (ref 22–31)
CO2 SERPL-SCNC: 29 MMOL/L — SIGNIFICANT CHANGE UP (ref 22–31)
COLOR SPEC: YELLOW — SIGNIFICANT CHANGE UP
CREAT SERPL-MCNC: 0.73 MG/DL — SIGNIFICANT CHANGE UP (ref 0.5–1.3)
CREAT SERPL-MCNC: 0.8 MG/DL — SIGNIFICANT CHANGE UP (ref 0.5–1.3)
DIFF PNL FLD: ABNORMAL
EGFR: 79 ML/MIN/1.73M2 — SIGNIFICANT CHANGE UP
EGFR: 88 ML/MIN/1.73M2 — SIGNIFICANT CHANGE UP
EOSINOPHIL # BLD AUTO: 0.23 K/UL — SIGNIFICANT CHANGE UP (ref 0–0.5)
EOSINOPHIL # BLD AUTO: 0.26 K/UL — SIGNIFICANT CHANGE UP (ref 0–0.5)
EOSINOPHIL NFR BLD AUTO: 1.7 % — SIGNIFICANT CHANGE UP (ref 0–6)
EOSINOPHIL NFR BLD AUTO: 2.3 % — SIGNIFICANT CHANGE UP (ref 0–6)
GAS PNL BLDA: SIGNIFICANT CHANGE UP
GAS PNL BLDA: SIGNIFICANT CHANGE UP
GLUCOSE BLDC GLUCOMTR-MCNC: 166 MG/DL — HIGH (ref 70–99)
GLUCOSE BLDC GLUCOMTR-MCNC: 190 MG/DL — HIGH (ref 70–99)
GLUCOSE BLDC GLUCOMTR-MCNC: 83 MG/DL — SIGNIFICANT CHANGE UP (ref 70–99)
GLUCOSE BLDC GLUCOMTR-MCNC: 97 MG/DL — SIGNIFICANT CHANGE UP (ref 70–99)
GLUCOSE SERPL-MCNC: 104 MG/DL — HIGH (ref 70–99)
GLUCOSE SERPL-MCNC: 182 MG/DL — HIGH (ref 70–99)
GLUCOSE UR QL: NEGATIVE MG/DL — SIGNIFICANT CHANGE UP
HCO3 BLDV-SCNC: 30 MMOL/L — HIGH (ref 22–29)
HCT VFR BLD CALC: 27.2 % — LOW (ref 34.5–45)
HCT VFR BLD CALC: 27.9 % — LOW (ref 34.5–45)
HCT VFR BLD CALC: 28.8 % — LOW (ref 34.5–45)
HGB BLD-MCNC: 8.7 G/DL — LOW (ref 11.5–15.5)
HGB BLD-MCNC: 8.7 G/DL — LOW (ref 11.5–15.5)
HGB BLD-MCNC: 8.9 G/DL — LOW (ref 11.5–15.5)
IMM GRANULOCYTES NFR BLD AUTO: 0.5 % — SIGNIFICANT CHANGE UP (ref 0–0.9)
IMM GRANULOCYTES NFR BLD AUTO: 0.6 % — SIGNIFICANT CHANGE UP (ref 0–0.9)
INR BLD: 1.2 — HIGH (ref 0.85–1.18)
INR BLD: 1.23 — HIGH (ref 0.85–1.18)
INR BLD: 1.24 — HIGH (ref 0.85–1.18)
KETONES UR-MCNC: NEGATIVE MG/DL — SIGNIFICANT CHANGE UP
LACTATE SERPL-SCNC: 0.7 MMOL/L — SIGNIFICANT CHANGE UP (ref 0.5–2)
LACTATE SERPL-SCNC: 0.8 MMOL/L — SIGNIFICANT CHANGE UP (ref 0.5–2)
LACTATE SERPL-SCNC: 1.3 MMOL/L — SIGNIFICANT CHANGE UP (ref 0.5–2)
LEUKOCYTE ESTERASE UR-ACNC: ABNORMAL
LYMPHOCYTES # BLD AUTO: 0.81 K/UL — LOW (ref 1–3.3)
LYMPHOCYTES # BLD AUTO: 0.86 K/UL — LOW (ref 1–3.3)
LYMPHOCYTES # BLD AUTO: 6.5 % — LOW (ref 13–44)
LYMPHOCYTES # BLD AUTO: 7.2 % — LOW (ref 13–44)
MAGNESIUM SERPL-MCNC: 2 MG/DL — SIGNIFICANT CHANGE UP (ref 1.6–2.6)
MAGNESIUM SERPL-MCNC: 2.1 MG/DL — SIGNIFICANT CHANGE UP (ref 1.6–2.6)
MAGNESIUM SERPL-MCNC: 2.1 MG/DL — SIGNIFICANT CHANGE UP (ref 1.6–2.6)
MCHC RBC-ENTMCNC: 26.6 PG — LOW (ref 27–34)
MCHC RBC-ENTMCNC: 26.9 PG — LOW (ref 27–34)
MCHC RBC-ENTMCNC: 27.1 PG — SIGNIFICANT CHANGE UP (ref 27–34)
MCHC RBC-ENTMCNC: 30.9 GM/DL — LOW (ref 32–36)
MCHC RBC-ENTMCNC: 31.2 GM/DL — LOW (ref 32–36)
MCHC RBC-ENTMCNC: 32 GM/DL — SIGNIFICANT CHANGE UP (ref 32–36)
MCV RBC AUTO: 84.7 FL — SIGNIFICANT CHANGE UP (ref 80–100)
MCV RBC AUTO: 86.1 FL — SIGNIFICANT CHANGE UP (ref 80–100)
MCV RBC AUTO: 86.2 FL — SIGNIFICANT CHANGE UP (ref 80–100)
MONOCYTES # BLD AUTO: 1.14 K/UL — HIGH (ref 0–0.9)
MONOCYTES # BLD AUTO: 1.25 K/UL — HIGH (ref 0–0.9)
MONOCYTES NFR BLD AUTO: 10.2 % — SIGNIFICANT CHANGE UP (ref 2–14)
MONOCYTES NFR BLD AUTO: 9.5 % — SIGNIFICANT CHANGE UP (ref 2–14)
MUCOUS THREADS # UR AUTO: PRESENT
NEUTROPHILS # BLD AUTO: 10.69 K/UL — HIGH (ref 1.8–7.4)
NEUTROPHILS # BLD AUTO: 8.92 K/UL — HIGH (ref 1.8–7.4)
NEUTROPHILS NFR BLD AUTO: 79.4 % — HIGH (ref 43–77)
NEUTROPHILS NFR BLD AUTO: 81.4 % — HIGH (ref 43–77)
NITRITE UR-MCNC: NEGATIVE — SIGNIFICANT CHANGE UP
NRBC # BLD: 0 /100 WBCS — SIGNIFICANT CHANGE UP (ref 0–0)
PCO2 BLDV: 39 MMHG — SIGNIFICANT CHANGE UP (ref 39–42)
PH BLDV: 7.49 — HIGH (ref 7.32–7.43)
PH UR: 5.5 — SIGNIFICANT CHANGE UP (ref 5–8)
PHOSPHATE SERPL-MCNC: 3.1 MG/DL — SIGNIFICANT CHANGE UP (ref 2.5–4.5)
PHOSPHATE SERPL-MCNC: 3.2 MG/DL — SIGNIFICANT CHANGE UP (ref 2.5–4.5)
PHOSPHATE SERPL-MCNC: 3.8 MG/DL — SIGNIFICANT CHANGE UP (ref 2.5–4.5)
PLATELET # BLD AUTO: 408 K/UL — HIGH (ref 150–400)
PLATELET # BLD AUTO: 417 K/UL — HIGH (ref 150–400)
PLATELET # BLD AUTO: 448 K/UL — HIGH (ref 150–400)
PO2 BLDV: 34 MMHG — SIGNIFICANT CHANGE UP (ref 25–45)
POTASSIUM SERPL-MCNC: 3.6 MMOL/L — SIGNIFICANT CHANGE UP (ref 3.5–5.3)
POTASSIUM SERPL-MCNC: 4.5 MMOL/L — SIGNIFICANT CHANGE UP (ref 3.5–5.3)
POTASSIUM SERPL-SCNC: 3.6 MMOL/L — SIGNIFICANT CHANGE UP (ref 3.5–5.3)
POTASSIUM SERPL-SCNC: 4.5 MMOL/L — SIGNIFICANT CHANGE UP (ref 3.5–5.3)
PROT SERPL-MCNC: 6.1 G/DL — SIGNIFICANT CHANGE UP (ref 6–8.3)
PROT SERPL-MCNC: 6.3 G/DL — SIGNIFICANT CHANGE UP (ref 6–8.3)
PROT UR-MCNC: NEGATIVE MG/DL — SIGNIFICANT CHANGE UP
PROTHROM AB SERPL-ACNC: 13.6 SEC — HIGH (ref 9.5–13)
PROTHROM AB SERPL-ACNC: 13.9 SEC — HIGH (ref 9.5–13)
PROTHROM AB SERPL-ACNC: 14.1 SEC — HIGH (ref 9.5–13)
RBC # BLD: 3.21 M/UL — LOW (ref 3.8–5.2)
RBC # BLD: 3.24 M/UL — LOW (ref 3.8–5.2)
RBC # BLD: 3.34 M/UL — LOW (ref 3.8–5.2)
RBC # FLD: 16.1 % — HIGH (ref 10.3–14.5)
RBC # FLD: 16.1 % — HIGH (ref 10.3–14.5)
RBC # FLD: 16.2 % — HIGH (ref 10.3–14.5)
RBC CASTS # UR COMP ASSIST: 7 /HPF — HIGH (ref 0–4)
RENAL EPI CELLS # UR COMP ASSIST: PRESENT
SAO2 % BLDV: 52.4 % — LOW (ref 67–88)
SODIUM SERPL-SCNC: 141 MMOL/L — SIGNIFICANT CHANGE UP (ref 135–145)
SODIUM SERPL-SCNC: 143 MMOL/L — SIGNIFICANT CHANGE UP (ref 135–145)
SP GR SPEC: 1.01 — SIGNIFICANT CHANGE UP (ref 1–1.03)
SQUAMOUS # UR AUTO: >36 /HPF — HIGH (ref 0–5)
SURGICAL PATHOLOGY STUDY: SIGNIFICANT CHANGE UP
TRANS CELLS #/AREA URNS HPF: PRESENT
UROBILINOGEN FLD QL: 0.2 MG/DL — SIGNIFICANT CHANGE UP (ref 0.2–1)
WBC # BLD: 11.23 K/UL — HIGH (ref 3.8–10.5)
WBC # BLD: 13.15 K/UL — HIGH (ref 3.8–10.5)
WBC # BLD: 14.57 K/UL — HIGH (ref 3.8–10.5)
WBC # FLD AUTO: 11.23 K/UL — HIGH (ref 3.8–10.5)
WBC # FLD AUTO: 13.15 K/UL — HIGH (ref 3.8–10.5)
WBC # FLD AUTO: 14.57 K/UL — HIGH (ref 3.8–10.5)
WBC UR QL: 2 /HPF — SIGNIFICANT CHANGE UP (ref 0–5)

## 2024-03-27 PROCEDURE — 71045 X-RAY EXAM CHEST 1 VIEW: CPT | Mod: 26

## 2024-03-27 PROCEDURE — 99232 SBSQ HOSP IP/OBS MODERATE 35: CPT

## 2024-03-27 PROCEDURE — 71045 X-RAY EXAM CHEST 1 VIEW: CPT | Mod: 26,77

## 2024-03-27 RX ORDER — INSULIN GLARGINE 100 [IU]/ML
4 INJECTION, SOLUTION SUBCUTANEOUS AT BEDTIME
Refills: 0 | Status: DISCONTINUED | OUTPATIENT
Start: 2024-03-27 | End: 2024-03-28

## 2024-03-27 RX ORDER — POTASSIUM CHLORIDE 20 MEQ
20 PACKET (EA) ORAL DAILY
Refills: 0 | Status: DISCONTINUED | OUTPATIENT
Start: 2024-03-27 | End: 2024-03-31

## 2024-03-27 RX ORDER — ACETAMINOPHEN 500 MG
650 TABLET ORAL EVERY 6 HOURS
Refills: 0 | Status: DISCONTINUED | OUTPATIENT
Start: 2024-03-27 | End: 2024-03-31

## 2024-03-27 RX ORDER — BUMETANIDE 0.25 MG/ML
1 INJECTION INTRAMUSCULAR; INTRAVENOUS ONCE
Refills: 0 | Status: COMPLETED | OUTPATIENT
Start: 2024-03-27 | End: 2024-03-27

## 2024-03-27 RX ORDER — POTASSIUM CHLORIDE 20 MEQ
40 PACKET (EA) ORAL ONCE
Refills: 0 | Status: COMPLETED | OUTPATIENT
Start: 2024-03-27 | End: 2024-03-27

## 2024-03-27 RX ORDER — CEFTRIAXONE 500 MG/1
1000 INJECTION, POWDER, FOR SOLUTION INTRAMUSCULAR; INTRAVENOUS EVERY 24 HOURS
Refills: 0 | Status: COMPLETED | OUTPATIENT
Start: 2024-03-27 | End: 2024-03-29

## 2024-03-27 RX ORDER — LINAGLIPTIN 5 MG/1
5 TABLET, FILM COATED ORAL
Refills: 0 | Status: DISCONTINUED | OUTPATIENT
Start: 2024-03-28 | End: 2024-03-28

## 2024-03-27 RX ORDER — FUROSEMIDE 40 MG
40 TABLET ORAL
Refills: 0 | Status: DISCONTINUED | OUTPATIENT
Start: 2024-03-27 | End: 2024-03-27

## 2024-03-27 RX ORDER — BUMETANIDE 0.25 MG/ML
1 INJECTION INTRAMUSCULAR; INTRAVENOUS EVERY 12 HOURS
Refills: 0 | Status: DISCONTINUED | OUTPATIENT
Start: 2024-03-27 | End: 2024-03-28

## 2024-03-27 RX ADMIN — HEPARIN SODIUM 5000 UNIT(S): 5000 INJECTION INTRAVENOUS; SUBCUTANEOUS at 15:27

## 2024-03-27 RX ADMIN — PANTOPRAZOLE SODIUM 40 MILLIGRAM(S): 20 TABLET, DELAYED RELEASE ORAL at 07:06

## 2024-03-27 RX ADMIN — CEFTRIAXONE 100 MILLIGRAM(S): 500 INJECTION, POWDER, FOR SOLUTION INTRAMUSCULAR; INTRAVENOUS at 22:14

## 2024-03-27 RX ADMIN — Medication 2: at 22:17

## 2024-03-27 RX ADMIN — CHLORHEXIDINE GLUCONATE 1 APPLICATION(S): 213 SOLUTION TOPICAL at 05:17

## 2024-03-27 RX ADMIN — HEPARIN SODIUM 5000 UNIT(S): 5000 INJECTION INTRAVENOUS; SUBCUTANEOUS at 22:14

## 2024-03-27 RX ADMIN — Medication 20 MILLIEQUIVALENT(S): at 11:42

## 2024-03-27 RX ADMIN — Medication 2: at 17:53

## 2024-03-27 RX ADMIN — HEPARIN SODIUM 5000 UNIT(S): 5000 INJECTION INTRAVENOUS; SUBCUTANEOUS at 05:17

## 2024-03-27 RX ADMIN — Medication 40 MILLIGRAM(S): at 15:27

## 2024-03-27 RX ADMIN — GLYCOPYRROLATE AND FORMOTEROL FUMARATE 2 PUFF(S): 9; 4.8 AEROSOL, METERED RESPIRATORY (INHALATION) at 21:00

## 2024-03-27 RX ADMIN — AMIODARONE HYDROCHLORIDE 200 MILLIGRAM(S): 400 TABLET ORAL at 05:16

## 2024-03-27 RX ADMIN — INSULIN GLARGINE 4 UNIT(S): 100 INJECTION, SOLUTION SUBCUTANEOUS at 22:14

## 2024-03-27 RX ADMIN — BUMETANIDE 1 MILLIGRAM(S): 0.25 INJECTION INTRAMUSCULAR; INTRAVENOUS at 00:29

## 2024-03-27 RX ADMIN — Medication 81 MILLIGRAM(S): at 11:42

## 2024-03-27 RX ADMIN — Medication 5 UNIT(S): at 07:49

## 2024-03-27 RX ADMIN — INSULIN GLARGINE 8 UNIT(S): 100 INJECTION, SOLUTION SUBCUTANEOUS at 00:36

## 2024-03-27 RX ADMIN — GLYCOPYRROLATE AND FORMOTEROL FUMARATE 2 PUFF(S): 9; 4.8 AEROSOL, METERED RESPIRATORY (INHALATION) at 11:46

## 2024-03-27 RX ADMIN — Medication 40 MILLIGRAM(S): at 07:58

## 2024-03-27 RX ADMIN — Medication 40 MILLIEQUIVALENT(S): at 12:55

## 2024-03-27 RX ADMIN — BUMETANIDE 1 MILLIGRAM(S): 0.25 INJECTION INTRAMUSCULAR; INTRAVENOUS at 19:01

## 2024-03-27 NOTE — PROGRESS NOTE ADULT - SUBJECTIVE AND OBJECTIVE BOX
INTERVAL HPI/OVERNIGHT EVENTS:    3/19:  Bio Bental  EF normal/hyperdynamic    74yo Female Hx DM, severe AS, HTN, HLD, anemia presenting to [facility] with syncope.    CT Head - negative  ECHO: severe AS. Transferred to [facility] for intervention - possible TAVR vs BAV.     not candidate for perc intervention due to anatomy    3/19 - OR - intraop: 2.8 L LR/4 U pRBC/5 cryo/2 FFP/1 platelet  3/20 - dobutamine started (elevated LA) with improvement ; primacor added and Extubated    3/21 - Fib noted -  titrated off; milrinone 0.25  amio started and milrinone titrated down ; Abx started for tracheobronchitis    3/22: ongoing pAFib - amio and metoprolol -   3/23 - bradycardic - drop in UO and rising LA/LFT - concern for low output state  pacing inc (inc HR) and amio/metoprolol held -  trent placed  ECHO obtained - contacted by ECHO reporting RV down     Inotropic support started ; anemia addresed with transfusion support     right pigtail placed - 900 cc out;  Left pigtail placed 600   overall improvement - LA normal; making urine   3/24 - Abx de-escalated based on bronch specimen - normal olayinka -   alternating  fib/sinus     ceftriaxone redosed - now 5 days given - d/c     3/25:   new lines; swan replaced   /primacor     ECHO - Normal left and right ventricular size and systolic function. A bioprosthetic valve noted in the aortic position. The peak transvalvular velocity is 2.73 m/s, the mean transvalvular gradient is   11.00 mmHg, and the LVOT/AV velocity ratio is 0.51. The peak transaortic gradient is 29.81 mmHg. There is no evidence of aortic regurgitation.  No pericardial effusion.    currently in sinus   OOB in chair - titrated off HFNC and on RA with good Sa02     No acute events reported overnight     patient up and ambulating with staff     ICU Vital Signs Last 24 Hrs  T(C): 36.3 (27 Mar 2024 09:21), Max: 36.8 (26 Mar 2024 22:31)  T(F): 97.3 (27 Mar 2024 09:21), Max: 98.3 (26 Mar 2024 22:31)  HR: 91 (27 Mar 2024 09:00) (84 - 93) sinus   BP: 115/53 (27 Mar 2024 09:00) (97/44 - 127/59)  BP(mean): 77 (27 Mar 2024 09:00) (64 - 85)  ABP: 111/41 (27 Mar 2024 09:00) (101/45 - 148/52)  ABP(mean): 61 (27 Mar 2024 09:00) (58 - 81)  RR: 16 (27 Mar 2024 09:00) (14 - 18)  SpO2: 98% (27 Mar 2024 09:00) (98% - 100%) RA    Qtts: none     I&O's Summary    26 Mar 2024 07:01  -  27 Mar 2024 07:00  --------------------------------------------------------  IN: 1180.4 mL / OUT: 2015 mL / NET: -834.6 mL    27 Mar 2024 07:01  -  27 Mar 2024 10:52  --------------------------------------------------------  IN: 150 mL / OUT: 65 mL / NET: 85 mL    Physical Exam    Heart - regular (-)rub/gallop  Lungs - dec BS base - no rhonchi/wheeze  Abd - (+)BS soft NTND (-)r/r/g  Ext - warm to touch; no cyanosis/clubbing  Chest - incision site clean and dry   Neuro - alert/oriented and interactive - nonfocal   Skin - no rash       LABS:                        8.9    13.15 )-----------( 417      ( 27 Mar 2024 09:34 )             28.8     03-27    141  |  102  |  15  ----------------------------<  104<H>  3.6   |  29  |  0.73    Ca    9.7      27 Mar 2024 09:34  Phos  3.2     03-27  Mg     2.1         TPro  6.3  /  Alb  3.5  /  TBili  0.6  /  DBili  x   /  AST  34  /  ALT  76<H>  /  AlkPhos  102      PT/INR - ( 27 Mar 2024 09:34 )   PT: 13.9 sec;   INR: 1.23     PTT - ( 27 Mar 2024 09:34 )  PTT:29.5 sec    ABG - ( 27 Mar 2024 10:16 )  pH, Arterial: 7.53  pH, Blood: x     /  pCO2: 38    /  pO2: 62    / HCO3: 32    / Base Excess: 8.5   /  SaO2: 92.8      RADIOLOGY & ADDITIONAL STUDIES: reviewed     Patient with severe symptomatic aortic stenosis s/p Bio Bental - post-op cardiogenic shock improved and titrated off inotropic support - placed on Amio and metoprolol for atrial fib - noted relative hypotension SBP- 80-90. with concern of hypoperfusion - inc LA/LFT and no UO  - now improved with transient pacing/colloid resuscitation and inotrope - off    1. CV  currently in sinus; paroxysmal fib   on amio po   judicious use of BB   ECHO 3/23: s/p AVR and right ventricle appears mildly reduced.  ASA  d/w CTS - no plan for systemic AC     2. Pulm   transition off HFNC; no on RA  incentive spirometry/ambulation with staff assist  titrate supplemental oxygen down to off -   pigtails bilaterally for effusions - monitor output ; assess for removal over time  Abx  completed for tracheobronchitis - bronch specmen - normal olayinka   Sputum 3/21 - normal olayinka     3. Endocrine  maintain glycemic control   endocrine following     d/w patient/staff and CTS     d/c planning

## 2024-03-27 NOTE — PROVIDER CONTACT NOTE (CHANGE IN STATUS NOTIFICATION) - ASSESSMENT
LVM regarding lab results. Advised patient to call if any question.  Mailed copy to patient.   Pt VSS, following commands appropriately, but verbally confused

## 2024-03-27 NOTE — PROGRESS NOTE ADULT - ASSESSMENT
74 y/o female PMH DM, severe AS, EF 60%, HTN, HLD, anemia who was admitted to [facility] with syncope. CT negative for CVA, but echo showed severe AS s/p SAVR 3/19/2024.    Endocrine consulted for T2 diabetes management.     A1C: 9.6 %  C-peptide 1.8 with  - March 2024  BUN: 15  Creatinine: 0.73  GFR: 88  Weight: 55.9  BMI: 22.5  EF: 60%

## 2024-03-27 NOTE — PROGRESS NOTE ADULT - SUBJECTIVE AND OBJECTIVE BOX
SUBJECTIVE / INTERVAL HPI: Patient was seen and examined this morning. She reports she is eating more, but diet recall is questionable. RN reports she had eggs and toast for breakfast. For lunch she had soup and glucerna. She's off HF NC. One CT removed.    CAPILLARY BLOOD GLUCOSE & INSULIN RECEIVED  103 mg/dL (03-26 @ 16:20)  106 mg/dL (03-26 @ 16:28) - Lispro 5  66 mg/dL (03-26 @ 21:20)  72 mg/dL (03-26 @ 21:41)  111 mg/dL (03-26 @ 22:10) - Lantus 8  97 mg/dL (03-27 @ 07:31)  - Lispro 5  104 mg/dL (03-27 @ 09:34)  83 mg/dL (03-27 @ 11:41) - Lispro 2  190 mg/dL (03-27 @ 17:09)      REVIEW OF SYSTEMS  Constitutional:  Negative fever, chills or loss of appetite.  Eyes:  Negative blurry vision or double vision.  Cardiovascular:  Negative for chest pain or palpitations.  Respiratory:  Negative for cough, wheezing, or shortness of breath.    Gastrointestinal:  Negative for nausea, vomiting, diarrhea, constipation, or abdominal pain.  Genitourinary:  Negative frequency, urgency or dysuria.  Neurologic:  No headache, confusion, dizziness, lightheadedness.    PHYSICAL EXAM  Vital Signs Last 24 Hrs  T(C): 37.9 (27 Mar 2024 17:00), Max: 37.9 (27 Mar 2024 17:00)  T(F): 100.2 (27 Mar 2024 17:00), Max: 100.2 (27 Mar 2024 17:00)  HR: 88 (27 Mar 2024 17:00) (84 - 102)  BP: 133/57 (27 Mar 2024 17:00) (97/44 - 133/57)  BP(mean): 82 (27 Mar 2024 17:00) (64 - 86)  RR: 16 (27 Mar 2024 17:00) (14 - 18)  SpO2: 93% (27 Mar 2024 17:00) (91% - 100%)    Parameters below as of 27 Mar 2024 17:00  Patient On (Oxygen Delivery Method): room air      Constitutional: Awake, alert, in no acute distress.   HEENT: Normocephalic, atraumatic, GEORGETTE, no proptosis or lid retraction.   Neck: supple, no acanthosis, no thyromegaly or palpable thyroid nodules.  Respiratory: Lungs clear to ausculation bilaterally. + CT.   Cardiovascular: regular rhythm, normal S1 and S2, + murmur + sternotomy  GI: soft, non-tender, non-distended, bowel sounds present, no masses appreciated.  Extremities: No lower extremity edema, peripheral pulses present.   Skin: no rashes.   Psychiatric: AAO x 3. Normal affect/mood.     LABS  CBC - WBC/HGB/HTC/PLT: 13.15/8.9/28.8/417 (03-27-24)  BMP - Na/K/Cl/Bicarb/BUN/Cr/Gluc/AG/eGFR: 141/3.6/102/29/15/0.73/104/10/88 (03-27-24)  Ca - 9.7 (03-27-24)  Phos - 3.2 (03-27-24)  Mg - 2.1 (03-27-24)  LFT - Alb/Tprot/Tbili/Dbili/AlkPhos/ALT/AST: 3.5/--/0.6/--/102/76/34 (03-27-24)  PT/aPTT/INR: 13.9/29.5/1.23 (03-27-24)   Thyroid Stimulating Hormone, Serum: 1.530 (03-12-24)      MEDICATIONS  MEDICATIONS  (STANDING):  aMIOdarone    Tablet 200 milliGRAM(s) Oral daily  aspirin enteric coated 81 milliGRAM(s) Oral daily  bismuth subsalicylate Liquid 5 milliLiter(s) Oral once  buMETAnide Injectable 1 milliGRAM(s) IV Push every 12 hours  chlorhexidine 2% Cloths 1 Application(s) Topical daily  dextrose 5%. 1000 milliLiter(s) (50 mL/Hr) IV Continuous <Continuous>  dextrose 5%. 1000 milliLiter(s) (100 mL/Hr) IV Continuous <Continuous>  dextrose 50% Injectable 50 milliLiter(s) IV Push every 15 minutes  dextrose 50% Injectable 25 milliLiter(s) IV Push every 15 minutes  dextrose 50% Injectable 25 Gram(s) IV Push once  glucagon  Injectable 1 milliGRAM(s) IntraMuscular once  glycopyrrolate 9 MICROgram(s)/formoterol 4.8 MICROgram(s) Inhaler 2 Puff(s) Inhalation two times a day  heparin   Injectable 5000 Unit(s) SubCutaneous every 8 hours  insulin glargine Injectable (LANTUS) 4 Unit(s) SubCutaneous at bedtime  insulin lispro (ADMELOG) corrective regimen sliding scale   SubCutaneous Before meals and at bedtime  linagliptin 5 milliGRAM(s) Oral <User Schedule>  pantoprazole    Tablet 40 milliGRAM(s) Oral before breakfast  polyethylene glycol 3350 17 Gram(s) Oral daily  potassium chloride    Tablet ER 20 milliEquivalent(s) Oral daily  senna 2 Tablet(s) Oral at bedtime  sodium chloride 0.9%. 1000 milliLiter(s) (10 mL/Hr) IV Continuous <Continuous>    MEDICATIONS  (PRN):  dextrose Oral Gel 15 Gram(s) Oral once PRN Blood Glucose LESS THAN 70 milliGRAM(s)/deciliter

## 2024-03-27 NOTE — PROGRESS NOTE ADULT - PROBLEM SELECTOR PLAN 1
Type 2 diabetes mellitus with hyperglycemia  - Please decrease lantus to 4 units at bedtime.   - Stop  nutritional insulin  - Start Tradjenta 5mg daily before breakfast.  - Continue lispro moderate dose sliding scale before meals and at bedtime.  - Patient's fingerstick glucose goal is 100-180 mg/dL.    - Consistent carb diet. Order Glucerna.  - For discharge, patient can ***.    - Patient can follow up at discharge with [facility] by calling [phone] to make an appointment.      Discussed with Dr Huffman. Primary team updated

## 2024-03-28 LAB
ALBUMIN SERPL ELPH-MCNC: 3.3 G/DL — SIGNIFICANT CHANGE UP (ref 3.3–5)
ALP SERPL-CCNC: 101 U/L — SIGNIFICANT CHANGE UP (ref 40–120)
ALT FLD-CCNC: 59 U/L — HIGH (ref 10–45)
ANION GAP SERPL CALC-SCNC: 12 MMOL/L — SIGNIFICANT CHANGE UP (ref 5–17)
APTT BLD: 28.8 SEC — SIGNIFICANT CHANGE UP (ref 24.5–35.6)
AST SERPL-CCNC: 25 U/L — SIGNIFICANT CHANGE UP (ref 10–40)
BASOPHILS # BLD AUTO: 0.03 K/UL — SIGNIFICANT CHANGE UP (ref 0–0.2)
BASOPHILS NFR BLD AUTO: 0.2 % — SIGNIFICANT CHANGE UP (ref 0–2)
BILIRUB SERPL-MCNC: 0.5 MG/DL — SIGNIFICANT CHANGE UP (ref 0.2–1.2)
BLD GP AB SCN SERPL QL: NEGATIVE — SIGNIFICANT CHANGE UP
BUN SERPL-MCNC: 17 MG/DL — SIGNIFICANT CHANGE UP (ref 7–23)
CALCIUM SERPL-MCNC: 9.4 MG/DL — SIGNIFICANT CHANGE UP (ref 8.4–10.5)
CHLORIDE SERPL-SCNC: 102 MMOL/L — SIGNIFICANT CHANGE UP (ref 96–108)
CO2 SERPL-SCNC: 28 MMOL/L — SIGNIFICANT CHANGE UP (ref 22–31)
CREAT SERPL-MCNC: 0.73 MG/DL — SIGNIFICANT CHANGE UP (ref 0.5–1.3)
EGFR: 88 ML/MIN/1.73M2 — SIGNIFICANT CHANGE UP
EOSINOPHIL # BLD AUTO: 0.16 K/UL — SIGNIFICANT CHANGE UP (ref 0–0.5)
EOSINOPHIL NFR BLD AUTO: 1.3 % — SIGNIFICANT CHANGE UP (ref 0–6)
GLUCOSE BLDC GLUCOMTR-MCNC: 176 MG/DL — HIGH (ref 70–99)
GLUCOSE BLDC GLUCOMTR-MCNC: 190 MG/DL — HIGH (ref 70–99)
GLUCOSE BLDC GLUCOMTR-MCNC: 201 MG/DL — HIGH (ref 70–99)
GLUCOSE BLDC GLUCOMTR-MCNC: 294 MG/DL — HIGH (ref 70–99)
GLUCOSE SERPL-MCNC: 191 MG/DL — HIGH (ref 70–99)
HCT VFR BLD CALC: 27.3 % — LOW (ref 34.5–45)
HGB BLD-MCNC: 8.5 G/DL — LOW (ref 11.5–15.5)
IMM GRANULOCYTES NFR BLD AUTO: 0.8 % — SIGNIFICANT CHANGE UP (ref 0–0.9)
INR BLD: 1.24 — HIGH (ref 0.85–1.18)
LACTATE SERPL-SCNC: 0.9 MMOL/L — SIGNIFICANT CHANGE UP (ref 0.5–2)
LYMPHOCYTES # BLD AUTO: 1.39 K/UL — SIGNIFICANT CHANGE UP (ref 1–3.3)
LYMPHOCYTES # BLD AUTO: 11 % — LOW (ref 13–44)
MAGNESIUM SERPL-MCNC: 2.2 MG/DL — SIGNIFICANT CHANGE UP (ref 1.6–2.6)
MCHC RBC-ENTMCNC: 27 PG — SIGNIFICANT CHANGE UP (ref 27–34)
MCHC RBC-ENTMCNC: 31.1 GM/DL — LOW (ref 32–36)
MCV RBC AUTO: 86.7 FL — SIGNIFICANT CHANGE UP (ref 80–100)
MONOCYTES # BLD AUTO: 1.3 K/UL — HIGH (ref 0–0.9)
MONOCYTES NFR BLD AUTO: 10.3 % — SIGNIFICANT CHANGE UP (ref 2–14)
NEUTROPHILS # BLD AUTO: 9.63 K/UL — HIGH (ref 1.8–7.4)
NEUTROPHILS NFR BLD AUTO: 76.4 % — SIGNIFICANT CHANGE UP (ref 43–77)
NRBC # BLD: 0 /100 WBCS — SIGNIFICANT CHANGE UP (ref 0–0)
PHOSPHATE SERPL-MCNC: 3.4 MG/DL — SIGNIFICANT CHANGE UP (ref 2.5–4.5)
PLATELET # BLD AUTO: 433 K/UL — HIGH (ref 150–400)
POTASSIUM SERPL-MCNC: 4 MMOL/L — SIGNIFICANT CHANGE UP (ref 3.5–5.3)
POTASSIUM SERPL-SCNC: 4 MMOL/L — SIGNIFICANT CHANGE UP (ref 3.5–5.3)
PROT SERPL-MCNC: 5.6 G/DL — LOW (ref 6–8.3)
PROTHROM AB SERPL-ACNC: 14.1 SEC — HIGH (ref 9.5–13)
RBC # BLD: 3.15 M/UL — LOW (ref 3.8–5.2)
RBC # FLD: 16.1 % — HIGH (ref 10.3–14.5)
RH IG SCN BLD-IMP: POSITIVE — SIGNIFICANT CHANGE UP
SODIUM SERPL-SCNC: 142 MMOL/L — SIGNIFICANT CHANGE UP (ref 135–145)
WBC # BLD: 12.61 K/UL — HIGH (ref 3.8–10.5)
WBC # FLD AUTO: 12.61 K/UL — HIGH (ref 3.8–10.5)

## 2024-03-28 PROCEDURE — 99291 CRITICAL CARE FIRST HOUR: CPT

## 2024-03-28 PROCEDURE — 99232 SBSQ HOSP IP/OBS MODERATE 35: CPT

## 2024-03-28 PROCEDURE — 71045 X-RAY EXAM CHEST 1 VIEW: CPT | Mod: 26,76

## 2024-03-28 PROCEDURE — 99292 CRITICAL CARE ADDL 30 MIN: CPT

## 2024-03-28 RX ORDER — BUMETANIDE 0.25 MG/ML
1 INJECTION INTRAMUSCULAR; INTRAVENOUS EVERY 8 HOURS
Refills: 0 | Status: DISCONTINUED | OUTPATIENT
Start: 2024-03-28 | End: 2024-03-29

## 2024-03-28 RX ORDER — INSULIN GLARGINE 100 [IU]/ML
6 INJECTION, SOLUTION SUBCUTANEOUS AT BEDTIME
Refills: 0 | Status: DISCONTINUED | OUTPATIENT
Start: 2024-03-28 | End: 2024-03-29

## 2024-03-28 RX ORDER — AMIODARONE HYDROCHLORIDE 400 MG/1
200 TABLET ORAL DAILY
Refills: 0 | Status: CANCELLED | OUTPATIENT
Start: 2024-04-05 | End: 2024-03-31

## 2024-03-28 RX ORDER — INSULIN LISPRO 100/ML
4 VIAL (ML) SUBCUTANEOUS
Refills: 0 | Status: DISCONTINUED | OUTPATIENT
Start: 2024-03-28 | End: 2024-03-29

## 2024-03-28 RX ORDER — METOPROLOL TARTRATE 50 MG
6.25 TABLET ORAL ONCE
Refills: 0 | Status: DISCONTINUED | OUTPATIENT
Start: 2024-03-28 | End: 2024-03-28

## 2024-03-28 RX ORDER — AMIODARONE HYDROCHLORIDE 400 MG/1
TABLET ORAL
Refills: 0 | Status: DISCONTINUED | OUTPATIENT
Start: 2024-03-28 | End: 2024-03-31

## 2024-03-28 RX ORDER — BUMETANIDE 0.25 MG/ML
1 INJECTION INTRAMUSCULAR; INTRAVENOUS ONCE
Refills: 0 | Status: COMPLETED | OUTPATIENT
Start: 2024-03-28 | End: 2024-03-28

## 2024-03-28 RX ORDER — AMIODARONE HYDROCHLORIDE 400 MG/1
200 TABLET ORAL EVERY 8 HOURS
Refills: 0 | Status: DISCONTINUED | OUTPATIENT
Start: 2024-03-28 | End: 2024-03-31

## 2024-03-28 RX ADMIN — Medication 81 MILLIGRAM(S): at 12:18

## 2024-03-28 RX ADMIN — AMIODARONE HYDROCHLORIDE 200 MILLIGRAM(S): 400 TABLET ORAL at 22:20

## 2024-03-28 RX ADMIN — HEPARIN SODIUM 5000 UNIT(S): 5000 INJECTION INTRAVENOUS; SUBCUTANEOUS at 22:20

## 2024-03-28 RX ADMIN — Medication 20 MILLIEQUIVALENT(S): at 12:17

## 2024-03-28 RX ADMIN — HEPARIN SODIUM 5000 UNIT(S): 5000 INJECTION INTRAVENOUS; SUBCUTANEOUS at 06:21

## 2024-03-28 RX ADMIN — AMIODARONE HYDROCHLORIDE 200 MILLIGRAM(S): 400 TABLET ORAL at 13:17

## 2024-03-28 RX ADMIN — BUMETANIDE 1 MILLIGRAM(S): 0.25 INJECTION INTRAMUSCULAR; INTRAVENOUS at 11:12

## 2024-03-28 RX ADMIN — Medication 4 UNIT(S): at 17:08

## 2024-03-28 RX ADMIN — Medication 4: at 22:18

## 2024-03-28 RX ADMIN — PANTOPRAZOLE SODIUM 40 MILLIGRAM(S): 20 TABLET, DELAYED RELEASE ORAL at 07:18

## 2024-03-28 RX ADMIN — HEPARIN SODIUM 5000 UNIT(S): 5000 INJECTION INTRAVENOUS; SUBCUTANEOUS at 13:17

## 2024-03-28 RX ADMIN — BUMETANIDE 1 MILLIGRAM(S): 0.25 INJECTION INTRAMUSCULAR; INTRAVENOUS at 22:20

## 2024-03-28 RX ADMIN — SENNA PLUS 2 TABLET(S): 8.6 TABLET ORAL at 22:20

## 2024-03-28 RX ADMIN — AMIODARONE HYDROCHLORIDE 200 MILLIGRAM(S): 400 TABLET ORAL at 06:21

## 2024-03-28 RX ADMIN — BUMETANIDE 1 MILLIGRAM(S): 0.25 INJECTION INTRAMUSCULAR; INTRAVENOUS at 06:21

## 2024-03-28 RX ADMIN — CEFTRIAXONE 100 MILLIGRAM(S): 500 INJECTION, POWDER, FOR SOLUTION INTRAMUSCULAR; INTRAVENOUS at 22:21

## 2024-03-28 RX ADMIN — BUMETANIDE 1 MILLIGRAM(S): 0.25 INJECTION INTRAMUSCULAR; INTRAVENOUS at 13:17

## 2024-03-28 RX ADMIN — Medication 2: at 16:14

## 2024-03-28 RX ADMIN — Medication 50 MILLIGRAM(S): at 11:12

## 2024-03-28 RX ADMIN — Medication 2: at 06:21

## 2024-03-28 RX ADMIN — Medication 6: at 11:17

## 2024-03-28 RX ADMIN — CHLORHEXIDINE GLUCONATE 1 APPLICATION(S): 213 SOLUTION TOPICAL at 07:19

## 2024-03-28 RX ADMIN — LINAGLIPTIN 5 MILLIGRAM(S): 5 TABLET, FILM COATED ORAL at 06:21

## 2024-03-28 RX ADMIN — INSULIN GLARGINE 6 UNIT(S): 100 INJECTION, SOLUTION SUBCUTANEOUS at 22:19

## 2024-03-28 NOTE — PROGRESS NOTE ADULT - PROBLEM SELECTOR PLAN 1
Type 2 diabetes mellitus with hyperglycemia  - Please decrease lantus to 4 units at bedtime.   - Stop  nutritional insulin  - Start Tradjenta 5mg daily before breakfast.  - Continue lispro moderate dose sliding scale before meals and at bedtime.  - Patient's fingerstick glucose goal is 100-180 mg/dL.    - Consistent carb diet. Order Glucerna.  - For discharge, patient can ***.    - Patient can follow up at discharge with [facility] by calling [phone] to make an appointment.      Discussed with Dr Huffman. Primary team updated. Type 2 diabetes mellitus with hyperglycemia  - Please increase lantus to 6 units at bedtime.   - Start lispro 4 units before meals.    - STOP Tradjenta 5mg daily before breakfast.  - Continue lispro moderate dose sliding scale before meals and at bedtime.  - Patient's fingerstick glucose goal is 100-180 mg/dL.    - Consistent carb diet. Order Glucerna.  - For discharge, patient can ***.    - Patient can follow up at discharge with [facility] by calling [phone] to make an appointment.      Discussed with Dr Huffman. Primary team updated.

## 2024-03-28 NOTE — PROGRESS NOTE ADULT - SUBJECTIVE AND OBJECTIVE BOX
SUBJECTIVE / INTERVAL HPI: Patient was seen and examined this morning. Reported confusion overnight, and diet recall is not reliable. Said she had tuna for dinner. For breakfast, shoe ordered cereal, but was served eggs and obregon, which she didn't eat. She had about half a glucerna. Glucose went up after breakfast suggesting that she ate more than she recalled.    CAPILLARY BLOOD GLUCOSE & INSULIN RECEIVED  190 mg/dL (03-27 @ 17:09) - Lispro 2  182 mg/dL (03-27 @ 18:03)  166 mg/dL (03-27 @ 22:13) - Lantus 4 + lispro 2  191 mg/dL (03-28 @ 03:29)  176 mg/dL (03-28 @ 06:12) - Lispro 2, Tradjenta 5  294 mg/dL (03-28 @ 11:14)      REVIEW OF SYSTEMS  Constitutional:  Negative fever, chills or loss of appetite.  Eyes:  Negative blurry vision or double vision.  Cardiovascular:  Negative for chest pain or palpitations.  Respiratory:  Negative for cough, wheezing, or shortness of breath.    Gastrointestinal:  Negative for nausea, vomiting, diarrhea, constipation, or abdominal pain.  Genitourinary:  Negative frequency, urgency or dysuria.  Neurologic:  No headache, confusion, dizziness, lightheadedness.    PHYSICAL EXAM  Vital Signs Last 24 Hrs  T(C): 36.6 (28 Mar 2024 09:18), Max: 37.9 (27 Mar 2024 17:00)  T(F): 97.8 (28 Mar 2024 09:18), Max: 100.2 (27 Mar 2024 17:00)  HR: 120 (28 Mar 2024 12:00) (87 - 131)  BP: 112/77 (28 Mar 2024 12:00) (88/51 - 136/62)  BP(mean): 84 (28 Mar 2024 12:00) (66 - 89)  RR: 16 (28 Mar 2024 12:00) (16 - 18)  SpO2: 100% (28 Mar 2024 12:00) (91% - 100%)    Parameters below as of 28 Mar 2024 12:00  Patient On (Oxygen Delivery Method): room air      Constitutional: Awake, alert, in no acute distress.   HEENT: Normocephalic, atraumatic, GEORGETTE, no proptosis or lid retraction.   Neck: supple, no acanthosis, no thyromegaly or palpable thyroid nodules.  Respiratory: Lungs clear to ausculation bilaterally. + CT.   Cardiovascular: regular rhythm, normal S1 and S2, + murmur + sternotomy  GI: soft, non-tender, non-distended, bowel sounds present, no masses appreciated.  Extremities: No lower extremity edema, peripheral pulses present.   Skin: no rashes.   Psychiatric: AAO x 3. Normal affect/mood.       LABS  CBC - WBC/HGB/HTC/PLT: 12.61/8.5/27.3/433 (03-28-24)  BMP - Na/K/Cl/Bicarb/BUN/Cr/Gluc/AG/eGFR: 142/4.0/102/28/17/0.73/191/12/88 (03-28-24)  Ca - 9.4 (03-28-24)  Phos - 3.4 (03-28-24)  Mg - 2.2 (03-28-24)  LFT - Alb/Tprot/Tbili/Dbili/AlkPhos/ALT/AST: 3.3/--/0.5/--/101/59/25 (03-28-24)  PT/aPTT/INR: 14.1/28.8/1.24 (03-28-24)   Thyroid Stimulating Hormone, Serum: 1.530 (03-12-24)      MEDICATIONS  MEDICATIONS  (STANDING):  aMIOdarone    Tablet 200 milliGRAM(s) Oral every 8 hours  aMIOdarone    Tablet   Oral   aspirin enteric coated 81 milliGRAM(s) Oral daily  buMETAnide Injectable 1 milliGRAM(s) IV Push every 8 hours  cefTRIAXone   IVPB 1000 milliGRAM(s) IV Intermittent every 24 hours  chlorhexidine 2% Cloths 1 Application(s) Topical daily  dextrose 5%. 1000 milliLiter(s) (100 mL/Hr) IV Continuous <Continuous>  dextrose 5%. 1000 milliLiter(s) (50 mL/Hr) IV Continuous <Continuous>  dextrose 50% Injectable 25 Gram(s) IV Push once  dextrose 50% Injectable 25 milliLiter(s) IV Push every 15 minutes  dextrose 50% Injectable 50 milliLiter(s) IV Push every 15 minutes  glucagon  Injectable 1 milliGRAM(s) IntraMuscular once  glycopyrrolate 9 MICROgram(s)/formoterol 4.8 MICROgram(s) Inhaler 2 Puff(s) Inhalation two times a day  heparin   Injectable 5000 Unit(s) SubCutaneous every 8 hours  insulin glargine Injectable (LANTUS) 4 Unit(s) SubCutaneous at bedtime  insulin lispro (ADMELOG) corrective regimen sliding scale   SubCutaneous Before meals and at bedtime  linagliptin 5 milliGRAM(s) Oral <User Schedule>  pantoprazole    Tablet 40 milliGRAM(s) Oral before breakfast  polyethylene glycol 3350 17 Gram(s) Oral daily  potassium chloride    Tablet ER 20 milliEquivalent(s) Oral daily  senna 2 Tablet(s) Oral at bedtime  sodium chloride 0.9%. 1000 milliLiter(s) (10 mL/Hr) IV Continuous <Continuous>  testosterone 1% Gel 50 milliGRAM(s) Topical daily    MEDICATIONS  (PRN):  acetaminophen     Tablet .. 650 milliGRAM(s) Oral every 6 hours PRN Mild Pain (1 - 3)  dextrose Oral Gel 15 Gram(s) Oral once PRN Blood Glucose LESS THAN 70 milliGRAM(s)/deciliter

## 2024-03-28 NOTE — PROGRESS NOTE ADULT - ASSESSMENT
72 y/o female PMH DM, severe AS, EF 60%, HTN, HLD, anemia who was admitted to [facility] with syncope. CT negative for CVA, but echo showed severe AS s/p SAVR 3/19/2024.    Endocrine consulted for T2 diabetes management.     A1C: 9.6 %  C-peptide 1.8 with  - March 2024  BUN: 17  Creatinine: 0.73  GFR: 88  Weight: 55.9  BMI: 22.5  EF: 60%   72 y/o female PMH DM, severe AS, EF 60%, HTN, HLD, anemia who was admitted to [facility] with syncope. CT negative for CVA, but echo showed severe AS s/p SAVR 3/19/2024.    Endocrine consulted for T2 diabetes management. Attempted basal + tradjenta for 1 day, but tradjenta did not control mealtime glucose.    A1C: 9.6 %  C-peptide 1.8 with  - March 2024  BUN: 17  Creatinine: 0.73  GFR: 88  Weight: 55.9  BMI: 22.5  EF: 60%

## 2024-03-28 NOTE — PROGRESS NOTE ADULT - SUBJECTIVE AND OBJECTIVE BOX
CTICU  CRITICAL  CARE  attending     Hand off received 					   Pertinent clinical, laboratory, radiographic, hemodynamic, echocardiographic, respiratory data, microbiologic data and chart were reviewed and analyzed frequently throughout the course of the day and night  Patient seen and examined with CTS/ SH attending at bedside  Pt is a 73y , Female, HEALTH ISSUES - PROBLEM Dx:  Aortic stenosis, severe    HTN (hypertension)    Hyperlipidemia    Type 2 diabetes mellitus    Right ventricular systolic dysfunction without heart failure    Bilateral pleural effusion        , FAMILY HISTORY:  No pertinent family history in first degree relatives    PAST MEDICAL & SURGICAL HISTORY:  Aortic stenosis      HTN (hypertension)      DM (diabetes mellitus)      Hyperlipidemia      Anemia      No significant past surgical history        Patient is a 73y old  Female who presents with a chief complaint of severe AS (28 Mar 2024 12:25)      14 system review was unremarkable    Vital signs, hemodynamic and respiratory parameters were reviewed from the bedside nursing flowsheet.  ICU Vital Signs Last 24 Hrs  T(C): 36.8 (28 Mar 2024 21:18), Max: 36.8 (28 Mar 2024 21:18)  T(F): 98.2 (28 Mar 2024 21:18), Max: 98.2 (28 Mar 2024 21:18)  HR: 107 (29 Mar 2024 00:00) (80 - 131)  BP: 122/61 (29 Mar 2024 00:00) (94/53 - 136/62)  BP(mean): 77 (29 Mar 2024 00:00) (67 - 89)  ABP: --  ABP(mean): --  RR: 18 (29 Mar 2024 00:00) (16 - 18)  SpO2: 92% (29 Mar 2024 00:00) (92% - 100%)    O2 Parameters below as of 29 Mar 2024 00:00  Patient On (Oxygen Delivery Method): room air          Adult Advanced Hemodynamics Last 24 Hrs  CVP(mm Hg): 199 (28 Mar 2024 10:00) (8 - 199)  CVP(cm H2O): --  CO: --  CI: --  PA: --  PA(mean): --  PCWP: --  SVR: --  SVRI: --  PVR: --  PVRI: --, ABG - ( 27 Mar 2024 10:16 )  pH, Arterial: 7.53  pH, Blood: x     /  pCO2: 38    /  pO2: 62    / HCO3: 32    / Base Excess: 8.5   /  SaO2: 92.8                Intake and output was reviewed and the fluid balance was calculated  Daily     Daily   I&O's Summary    27 Mar 2024 07:01  -  28 Mar 2024 07:00  --------------------------------------------------------  IN: 690 mL / OUT: 1605 mL / NET: -915 mL    28 Mar 2024 07:01  -  29 Mar 2024 00:47  --------------------------------------------------------  IN: 255 mL / OUT: 455 mL / NET: -200 mL        All lines and drain sites were assessed  Glycemic trend was reviewedCAPILLARY BLOOD GLUCOSE      POCT Blood Glucose.: 201 mg/dL (28 Mar 2024 22:13)    No acute change in mental status  Auscultation of the chest reveals equal bs  Abdomen is soft  Extremities are warm and well perfused  Wounds appear clean and unremarkable  Antibiotics are periop    labs  CBC Full  -  ( 28 Mar 2024 03:29 )  WBC Count : 12.61 K/uL  RBC Count : 3.15 M/uL  Hemoglobin : 8.5 g/dL  Hematocrit : 27.3 %  Platelet Count - Automated : 433 K/uL  Mean Cell Volume : 86.7 fl  Mean Cell Hemoglobin : 27.0 pg  Mean Cell Hemoglobin Concentration : 31.1 gm/dL  Auto Neutrophil # : 9.63 K/uL  Auto Lymphocyte # : 1.39 K/uL  Auto Monocyte # : 1.30 K/uL  Auto Eosinophil # : 0.16 K/uL  Auto Basophil # : 0.03 K/uL  Auto Neutrophil % : 76.4 %  Auto Lymphocyte % : 11.0 %  Auto Monocyte % : 10.3 %  Auto Eosinophil % : 1.3 %  Auto Basophil % : 0.2 %    03-28    142  |  102  |  17  ----------------------------<  191<H>  4.0   |  28  |  0.73    Ca    9.4      28 Mar 2024 03:29  Phos  3.4     03-28  Mg     2.2     03-28    TPro  5.6<L>  /  Alb  3.3  /  TBili  0.5  /  DBili  x   /  AST  25  /  ALT  59<H>  /  AlkPhos  101  03-28    PT/INR - ( 28 Mar 2024 03:29 )   PT: 14.1 sec;   INR: 1.24          PTT - ( 28 Mar 2024 03:29 )  PTT:28.8 sec  The current medications were reviewed   MEDICATIONS  (STANDING):  aMIOdarone    Tablet   Oral   aMIOdarone    Tablet 200 milliGRAM(s) Oral every 8 hours  aspirin enteric coated 81 milliGRAM(s) Oral daily  buMETAnide Injectable 1 milliGRAM(s) IV Push every 8 hours  cefTRIAXone   IVPB 1000 milliGRAM(s) IV Intermittent every 24 hours  chlorhexidine 2% Cloths 1 Application(s) Topical daily  dextrose 5%. 1000 milliLiter(s) (100 mL/Hr) IV Continuous <Continuous>  dextrose 5%. 1000 milliLiter(s) (50 mL/Hr) IV Continuous <Continuous>  dextrose 50% Injectable 50 milliLiter(s) IV Push every 15 minutes  dextrose 50% Injectable 25 milliLiter(s) IV Push every 15 minutes  dextrose 50% Injectable 25 Gram(s) IV Push once  glucagon  Injectable 1 milliGRAM(s) IntraMuscular once  glycopyrrolate 9 MICROgram(s)/formoterol 4.8 MICROgram(s) Inhaler 2 Puff(s) Inhalation two times a day  heparin   Injectable 5000 Unit(s) SubCutaneous every 8 hours  insulin glargine Injectable (LANTUS) 6 Unit(s) SubCutaneous at bedtime  insulin lispro (ADMELOG) corrective regimen sliding scale   SubCutaneous Before meals and at bedtime  insulin lispro Injectable (ADMELOG) 4 Unit(s) SubCutaneous three times a day before meals  pantoprazole    Tablet 40 milliGRAM(s) Oral before breakfast  polyethylene glycol 3350 17 Gram(s) Oral daily  potassium chloride    Tablet ER 20 milliEquivalent(s) Oral daily  senna 2 Tablet(s) Oral at bedtime  sodium chloride 0.9%. 1000 milliLiter(s) (10 mL/Hr) IV Continuous <Continuous>  testosterone 1% Gel 50 milliGRAM(s) Topical daily    MEDICATIONS  (PRN):  acetaminophen     Tablet .. 650 milliGRAM(s) Oral every 6 hours PRN Mild Pain (1 - 3)  dextrose Oral Gel 15 Gram(s) Oral once PRN Blood Glucose LESS THAN 70 milliGRAM(s)/deciliter       PROBLEM LIST/ ASSESSMENT:  HEALTH ISSUES - PROBLEM Dx:  Aortic stenosis, severe    HTN (hypertension)    Hyperlipidemia    Type 2 diabetes mellitus    Right ventricular systolic dysfunction without heart failure    Bilateral pleural effusion        ,   Patient is a 73y old  Female who presents with a chief complaint of severe AS (28 Mar 2024 12:25)     s/p cardiac surgery    Acute systolic and diastolic heart failure evidenced by SOB and parenchymal infiltrates; will treat with diuresis      Acute post operative pulmonary insufficiency ruled in due to hypoxemia, O2 sats < 91% on RA treated with HFNC          My plan includes :  close hemodynamic, ventilatory and drain monitoring and management per post op routine    Monitor for arrhythmias and monitor parameters for organ perfusion  beta blockade not administered due to hemodynamic instability and bradycardia  monitor neurologic status  Head of the bed should remain elevated to 45 deg .   chest PT and IS will be encouraged  monitor adequacy of oxygenation and ventilation and attempt to wean oxygen  antibiotic regimen will be tailored to the clinical, laboratory and microbiologic data  Nutritional goals will be met using po eventually , ensure adequate caloric intake and montior the same  Stress ulcer and VTE prophylaxis will be achieved    Glycemic control is satisfactory  Electrolytes have been repleted as necessary and wound care has been carried out. Pain control has been achieved.   agressive physical therapy and early mobility and ambulation goals will be met   The family was updated about the course and plan  CRITICAL CARE TIME Upon my evaluation, this patient had a high probability of imminent or life-threatening deterioration due to the above problems which required my direct attention, intervention, and personal management.  I have personally provided 150 minutes of critical care time exclusive of time spent on separately billable procedures. Time included review of laboratory data, radiology results, discussion with consultants, and monitoring for potential decompensation. Interventions were performed as documented abovepersonally provided by me  in evaluation and management, reassessments, review and interpretation of labs and x-rays, ventilator and hemodynamic management, formulating a plan and coordinating care: ___150___ MIN.  Time does not include procedural time.    CTICU ATTENDING     					    Axel Sinha MD

## 2024-03-29 LAB
ALBUMIN SERPL ELPH-MCNC: 3.2 G/DL — LOW (ref 3.3–5)
ALP SERPL-CCNC: 93 U/L — SIGNIFICANT CHANGE UP (ref 40–120)
ALT FLD-CCNC: 44 U/L — SIGNIFICANT CHANGE UP (ref 10–45)
ANION GAP SERPL CALC-SCNC: 16 MMOL/L — SIGNIFICANT CHANGE UP (ref 5–17)
APTT BLD: 25.9 SEC — SIGNIFICANT CHANGE UP (ref 24.5–35.6)
AST SERPL-CCNC: 29 U/L — SIGNIFICANT CHANGE UP (ref 10–40)
BASOPHILS # BLD AUTO: 0.07 K/UL — SIGNIFICANT CHANGE UP (ref 0–0.2)
BASOPHILS NFR BLD AUTO: 0.6 % — SIGNIFICANT CHANGE UP (ref 0–2)
BILIRUB SERPL-MCNC: 0.4 MG/DL — SIGNIFICANT CHANGE UP (ref 0.2–1.2)
BUN SERPL-MCNC: 20 MG/DL — SIGNIFICANT CHANGE UP (ref 7–23)
CALCIUM SERPL-MCNC: 9.4 MG/DL — SIGNIFICANT CHANGE UP (ref 8.4–10.5)
CHLORIDE SERPL-SCNC: 103 MMOL/L — SIGNIFICANT CHANGE UP (ref 96–108)
CO2 SERPL-SCNC: 24 MMOL/L — SIGNIFICANT CHANGE UP (ref 22–31)
CREAT SERPL-MCNC: 0.77 MG/DL — SIGNIFICANT CHANGE UP (ref 0.5–1.3)
EGFR: 82 ML/MIN/1.73M2 — SIGNIFICANT CHANGE UP
EOSINOPHIL # BLD AUTO: 0.3 K/UL — SIGNIFICANT CHANGE UP (ref 0–0.5)
EOSINOPHIL NFR BLD AUTO: 2.5 % — SIGNIFICANT CHANGE UP (ref 0–6)
GLUCOSE BLDC GLUCOMTR-MCNC: 179 MG/DL — HIGH (ref 70–99)
GLUCOSE BLDC GLUCOMTR-MCNC: 195 MG/DL — HIGH (ref 70–99)
GLUCOSE BLDC GLUCOMTR-MCNC: 235 MG/DL — HIGH (ref 70–99)
GLUCOSE BLDC GLUCOMTR-MCNC: 360 MG/DL — HIGH (ref 70–99)
GLUCOSE BLDC GLUCOMTR-MCNC: 411 MG/DL — HIGH (ref 70–99)
GLUCOSE BLDC GLUCOMTR-MCNC: 420 MG/DL — HIGH (ref 70–99)
GLUCOSE SERPL-MCNC: 178 MG/DL — HIGH (ref 70–99)
HCT VFR BLD CALC: 29.8 % — LOW (ref 34.5–45)
HGB BLD-MCNC: 9.2 G/DL — LOW (ref 11.5–15.5)
IMM GRANULOCYTES NFR BLD AUTO: 1 % — HIGH (ref 0–0.9)
INR BLD: 1.19 — HIGH (ref 0.85–1.18)
LYMPHOCYTES # BLD AUTO: 1.39 K/UL — SIGNIFICANT CHANGE UP (ref 1–3.3)
LYMPHOCYTES # BLD AUTO: 11.4 % — LOW (ref 13–44)
MAGNESIUM SERPL-MCNC: 2.6 MG/DL — SIGNIFICANT CHANGE UP (ref 1.6–2.6)
MCHC RBC-ENTMCNC: 26.8 PG — LOW (ref 27–34)
MCHC RBC-ENTMCNC: 30.9 GM/DL — LOW (ref 32–36)
MCV RBC AUTO: 86.9 FL — SIGNIFICANT CHANGE UP (ref 80–100)
MONOCYTES # BLD AUTO: 1.27 K/UL — HIGH (ref 0–0.9)
MONOCYTES NFR BLD AUTO: 10.4 % — SIGNIFICANT CHANGE UP (ref 2–14)
NEUTROPHILS # BLD AUTO: 9.07 K/UL — HIGH (ref 1.8–7.4)
NEUTROPHILS NFR BLD AUTO: 74.1 % — SIGNIFICANT CHANGE UP (ref 43–77)
NRBC # BLD: 0 /100 WBCS — SIGNIFICANT CHANGE UP (ref 0–0)
PHOSPHATE SERPL-MCNC: 3.9 MG/DL — SIGNIFICANT CHANGE UP (ref 2.5–4.5)
PLATELET # BLD AUTO: 469 K/UL — HIGH (ref 150–400)
POTASSIUM SERPL-MCNC: 4.3 MMOL/L — SIGNIFICANT CHANGE UP (ref 3.5–5.3)
POTASSIUM SERPL-SCNC: 4.3 MMOL/L — SIGNIFICANT CHANGE UP (ref 3.5–5.3)
PROT SERPL-MCNC: 6 G/DL — SIGNIFICANT CHANGE UP (ref 6–8.3)
PROTHROM AB SERPL-ACNC: 13.5 SEC — HIGH (ref 9.5–13)
RBC # BLD: 3.43 M/UL — LOW (ref 3.8–5.2)
RBC # FLD: 16.4 % — HIGH (ref 10.3–14.5)
SODIUM SERPL-SCNC: 143 MMOL/L — SIGNIFICANT CHANGE UP (ref 135–145)
WBC # BLD: 12.22 K/UL — HIGH (ref 3.8–10.5)
WBC # FLD AUTO: 12.22 K/UL — HIGH (ref 3.8–10.5)

## 2024-03-29 PROCEDURE — 71045 X-RAY EXAM CHEST 1 VIEW: CPT | Mod: 26

## 2024-03-29 PROCEDURE — 99232 SBSQ HOSP IP/OBS MODERATE 35: CPT

## 2024-03-29 RX ORDER — DILTIAZEM HCL 120 MG
30 CAPSULE, EXT RELEASE 24 HR ORAL DAILY
Refills: 0 | Status: DISCONTINUED | OUTPATIENT
Start: 2024-03-29 | End: 2024-03-29

## 2024-03-29 RX ORDER — DILTIAZEM HCL 120 MG
30 CAPSULE, EXT RELEASE 24 HR ORAL EVERY 8 HOURS
Refills: 0 | Status: DISCONTINUED | OUTPATIENT
Start: 2024-03-29 | End: 2024-03-29

## 2024-03-29 RX ORDER — INSULIN LISPRO 100/ML
8 VIAL (ML) SUBCUTANEOUS
Refills: 0 | Status: DISCONTINUED | OUTPATIENT
Start: 2024-03-29 | End: 2024-03-30

## 2024-03-29 RX ORDER — SODIUM CHLORIDE 9 MG/ML
1000 INJECTION, SOLUTION INTRAVENOUS
Refills: 0 | Status: DISCONTINUED | OUTPATIENT
Start: 2024-03-29 | End: 2024-03-31

## 2024-03-29 RX ORDER — INSULIN GLARGINE 100 [IU]/ML
10 INJECTION, SOLUTION SUBCUTANEOUS AT BEDTIME
Refills: 0 | Status: DISCONTINUED | OUTPATIENT
Start: 2024-03-29 | End: 2024-03-30

## 2024-03-29 RX ORDER — INSULIN LISPRO 100/ML
VIAL (ML) SUBCUTANEOUS
Refills: 0 | Status: DISCONTINUED | OUTPATIENT
Start: 2024-03-29 | End: 2024-03-31

## 2024-03-29 RX ORDER — INSULIN GLARGINE 100 [IU]/ML
6 INJECTION, SOLUTION SUBCUTANEOUS AT BEDTIME
Refills: 0 | Status: DISCONTINUED | OUTPATIENT
Start: 2024-03-29 | End: 2024-03-29

## 2024-03-29 RX ORDER — INSULIN LISPRO 100/ML
4 VIAL (ML) SUBCUTANEOUS
Refills: 0 | Status: DISCONTINUED | OUTPATIENT
Start: 2024-03-29 | End: 2024-03-29

## 2024-03-29 RX ORDER — APIXABAN 2.5 MG/1
5 TABLET, FILM COATED ORAL EVERY 12 HOURS
Refills: 0 | Status: DISCONTINUED | OUTPATIENT
Start: 2024-03-29 | End: 2024-03-31

## 2024-03-29 RX ORDER — BUMETANIDE 0.25 MG/ML
2 INJECTION INTRAMUSCULAR; INTRAVENOUS
Refills: 0 | Status: DISCONTINUED | OUTPATIENT
Start: 2024-03-29 | End: 2024-03-31

## 2024-03-29 RX ORDER — INSULIN LISPRO 100/ML
8 VIAL (ML) SUBCUTANEOUS
Refills: 0 | Status: DISCONTINUED | OUTPATIENT
Start: 2024-03-29 | End: 2024-03-29

## 2024-03-29 RX ORDER — APIXABAN 2.5 MG/1
5 TABLET, FILM COATED ORAL
Refills: 0 | Status: DISCONTINUED | OUTPATIENT
Start: 2024-03-29 | End: 2024-03-29

## 2024-03-29 RX ORDER — DEXTROSE 50 % IN WATER 50 %
15 SYRINGE (ML) INTRAVENOUS ONCE
Refills: 0 | Status: DISCONTINUED | OUTPATIENT
Start: 2024-03-29 | End: 2024-03-31

## 2024-03-29 RX ORDER — DEXTROSE 50 % IN WATER 50 %
25 SYRINGE (ML) INTRAVENOUS ONCE
Refills: 0 | Status: DISCONTINUED | OUTPATIENT
Start: 2024-03-29 | End: 2024-03-31

## 2024-03-29 RX ORDER — SODIUM CHLORIDE 9 MG/ML
3 INJECTION INTRAMUSCULAR; INTRAVENOUS; SUBCUTANEOUS EVERY 8 HOURS
Refills: 0 | Status: DISCONTINUED | OUTPATIENT
Start: 2024-03-29 | End: 2024-03-31

## 2024-03-29 RX ADMIN — BUMETANIDE 2 MILLIGRAM(S): 0.25 INJECTION INTRAMUSCULAR; INTRAVENOUS at 15:19

## 2024-03-29 RX ADMIN — Medication 20 MILLIEQUIVALENT(S): at 12:12

## 2024-03-29 RX ADMIN — PANTOPRAZOLE SODIUM 40 MILLIGRAM(S): 20 TABLET, DELAYED RELEASE ORAL at 06:03

## 2024-03-29 RX ADMIN — Medication 4 UNIT(S): at 07:02

## 2024-03-29 RX ADMIN — Medication 8 UNIT(S): at 17:26

## 2024-03-29 RX ADMIN — Medication 2: at 07:02

## 2024-03-29 RX ADMIN — GLYCOPYRROLATE AND FORMOTEROL FUMARATE 2 PUFF(S): 9; 4.8 AEROSOL, METERED RESPIRATORY (INHALATION) at 12:13

## 2024-03-29 RX ADMIN — INSULIN GLARGINE 10 UNIT(S): 100 INJECTION, SOLUTION SUBCUTANEOUS at 22:14

## 2024-03-29 RX ADMIN — HEPARIN SODIUM 5000 UNIT(S): 5000 INJECTION INTRAVENOUS; SUBCUTANEOUS at 05:41

## 2024-03-29 RX ADMIN — APIXABAN 5 MILLIGRAM(S): 2.5 TABLET, FILM COATED ORAL at 17:25

## 2024-03-29 RX ADMIN — CEFTRIAXONE 100 MILLIGRAM(S): 500 INJECTION, POWDER, FOR SOLUTION INTRAMUSCULAR; INTRAVENOUS at 22:13

## 2024-03-29 RX ADMIN — SENNA PLUS 2 TABLET(S): 8.6 TABLET ORAL at 22:13

## 2024-03-29 RX ADMIN — Medication 12: at 23:08

## 2024-03-29 RX ADMIN — SODIUM CHLORIDE 3 MILLILITER(S): 9 INJECTION INTRAMUSCULAR; INTRAVENOUS; SUBCUTANEOUS at 21:53

## 2024-03-29 RX ADMIN — AMIODARONE HYDROCHLORIDE 200 MILLIGRAM(S): 400 TABLET ORAL at 05:41

## 2024-03-29 RX ADMIN — POLYETHYLENE GLYCOL 3350 17 GRAM(S): 17 POWDER, FOR SOLUTION ORAL at 12:13

## 2024-03-29 RX ADMIN — Medication 4 UNIT(S): at 11:46

## 2024-03-29 RX ADMIN — CHLORHEXIDINE GLUCONATE 1 APPLICATION(S): 213 SOLUTION TOPICAL at 05:42

## 2024-03-29 RX ADMIN — GLYCOPYRROLATE AND FORMOTEROL FUMARATE 2 PUFF(S): 9; 4.8 AEROSOL, METERED RESPIRATORY (INHALATION) at 22:22

## 2024-03-29 RX ADMIN — SODIUM CHLORIDE 3 MILLILITER(S): 9 INJECTION INTRAMUSCULAR; INTRAVENOUS; SUBCUTANEOUS at 16:47

## 2024-03-29 RX ADMIN — GLYCOPYRROLATE AND FORMOTEROL FUMARATE 2 PUFF(S): 9; 4.8 AEROSOL, METERED RESPIRATORY (INHALATION) at 06:06

## 2024-03-29 RX ADMIN — BUMETANIDE 1 MILLIGRAM(S): 0.25 INJECTION INTRAMUSCULAR; INTRAVENOUS at 05:41

## 2024-03-29 RX ADMIN — AMIODARONE HYDROCHLORIDE 200 MILLIGRAM(S): 400 TABLET ORAL at 22:13

## 2024-03-29 RX ADMIN — AMIODARONE HYDROCHLORIDE 200 MILLIGRAM(S): 400 TABLET ORAL at 15:20

## 2024-03-29 NOTE — PROGRESS NOTE ADULT - ASSESSMENT
73 yr female with a PMHx DM, severe AS, EF 60%, HTN, HLD, anemia who was admitted to [facility] with syncope. CT negative for CVA. subsequent echo showed severe AS. Transferred to [facility] under Dr. Moon for evaluation for TAVR vs BAV. On 3/19 pt underwent a sternotomy with aortic root replacement (21mm Konect) EF 60%. Post op recovered in the ICU, swan removed POD 1 and ambulated. POD 2 hard tubes removed and primacor remained. POD 3 Primacor removed, delined and TOV passed. POD 4 Tracheobroncitis started on Ceftriaxone and Azithro bronched. Primacor back on, POD 5 weaning  and primacor. In and out of Afib. Effusions and b/l pigtails placed. POD 6 pigtails removed. POD 7 floor care, on bumex PO and not on BB due to blood pressure. Per Dr. Rhoades no huseyin agentds due to hx of CHB. in and out of rapid afib, continue amio load (tolerating well, no CHB episodes). PW removed, Eliquis started this PM.     Neurovascular:   No delirium. Pain well controlled with current regimen.  acetaminophen     Tablet .. 650 milliGRAM(s) every 6 hours PRN    Cardiovascular:   Hemodynamically stable. HR controlled  #s/p root replacement   #afib  -started on Eliquis post wires removal   -continue amio from icu, no further huseyin agents due to hx of CHB per Dr. Rhoades   -continue statin   -continue Bumex 2 BID   HR: 114 (80 - 124)  BP: 115/63 (94/53 - 154/54)    Respiratory:   02 Sat = 98% on RA.  RR: 16 (16 - 18)  SpO2: 93% (91% - 97%)  -Wean to RA from for O2 Sat > 93%.  -Encourage Cough, deep breathing and Use of IS 10x / hr while awake.  -Chest PT 4xdaily    GI:   Stable  Continue pantoprazole    Tablet 40 milliGRAM(s) before breakfast  polyethylene glycol 3350 17 Gram(s) daily  senna 2 Tablet(s) at bedtime  -PO DASH diet    Renal / :   BUN/Cr Stable 20/0.77  -Continue to monitor I/O's.    Endocrine:    Blood sugar stable   A1C: 9.6  TSH: 1.530    Hematologic:  H/H stable 9.2/29.8  -DVT prophylaxis with Heparin sq    ID:  -Afebrile  -Continue to observe for SIRS/Sepsis Syndrome.  T(C): 36.5, Max: 36.8 (24 @ 21:18)    Disposition:  -DC this weekend    73 yr female with a PMHx DM, severe AS, EF 60%, HTN, HLD, anemia who was admitted to [facility] with syncope. CT negative for CVA. subsequent echo showed severe AS. Transferred to [facility] under Dr. Moon for evaluation for TAVR vs BAV. On 3/19 pt underwent a sternotomy with aortic root replacement (21mm Konect) EF 60%. Post op recovered in the ICU, swan removed POD 1 and ambulated. became hypotensive and bradycardic while on BB and Amio requiring pacing via PW. BB stopped amio continued. POD 2 hard tubes removed and primacor remained. POD 3 Primacor removed, delined and TOV passed. POD 4 Tracheobroncitis started on Ceftriaxone and Azithro bronched. Primacor back on, POD 5 weaning  and primacor. In and out of Afib. Effusions and b/l pigtails placed. POD 6 pigtails removed. POD 7 floor care, on bumex PO and not on BB due to blood pressure. Per Dr. Rhoades no huseyin agentds due to hx of CHB. in and out of rapid afib, continue amio load (tolerating well, no CHB episodes). PW removed, Eliquis started this PM.     Neurovascular:   No delirium. Pain well controlled with current regimen.  acetaminophen     Tablet .. 650 milliGRAM(s) every 6 hours PRN    Cardiovascular:   Hemodynamically stable. HR controlled  #s/p root replacement   #afib  -started on Eliquis post wires removal   -continue amio from icu, no further huseyin agents due to hx of CHB per Dr. Rhoades   -continue statin   -continue Bumex 2 BID   HR: 114 (80 - 124)  BP: 115/63 (94/53 - 154/54)    Respiratory:   02 Sat = 98% on RA.  RR: 16 (16 - 18)  SpO2: 93% (91% - 97%)  -Wean to RA from for O2 Sat > 93%.  -Encourage Cough, deep breathing and Use of IS 10x / hr while awake.  -Chest PT 4xdaily    GI:   Stable  Continue pantoprazole    Tablet 40 milliGRAM(s) before breakfast  polyethylene glycol 3350 17 Gram(s) daily  senna 2 Tablet(s) at bedtime  -PO DASH diet    Renal / :   BUN/Cr Stable 20/0.77  -Continue to monitor I/O's.    Endocrine:    Blood sugar stable   A1C: 9.6  TSH: 1.530    Hematologic:  H/H stable 9.2/29.8  -DVT prophylaxis with Heparin sq    ID:  -Afebrile  -Continue to observe for SIRS/Sepsis Syndrome.  T(C): 36.5, Max: 36.8 (24 @ 21:18)    Disposition:  -DC this weekend

## 2024-03-29 NOTE — PROGRESS NOTE ADULT - PROBLEM SELECTOR PLAN 1
Type 2 diabetes mellitus with hyperglycemia  - Please increase lantus to 6 units at bedtime.   - Start lispro 4 units before meals.    - Continue lispro moderate dose sliding scale before meals and at bedtime.  - Patient's fingerstick glucose goal is 100-180 mg/dL.    - Consistent carb diet. Order Glucerna.  - For discharge, patient can ***.    - Patient can follow up at discharge with [facility] by calling [phone] to make an appointment.      Discussed with Dr Huffman. Primary team updated. Type 2 diabetes mellitus with hyperglycemia  - Please increase lantus to 10 units at bedtime.     - Increase lispro to 8 units before meals.    - Continue lispro moderate dose sliding scale before meals and at bedtime.  - Patient's fingerstick glucose goal is 100-180 mg/dL.    - Consistent carb diet. Stop Glucerna, pt is eating well again.  - For discharge: likely orals, but will need to see how much insulin she requires with good appetite and no glucerna.  - Patient can follow up at discharge with [facility] by calling [phone] to make an appointment.      Discussed with Dr Huffman. Primary team updated.

## 2024-03-29 NOTE — CHART NOTE - NSCHARTNOTESELECT_GEN_ALL_CORE
ICU Upgrade/Event Note
Nutrition Services
PW removal/Event Note
Pigtail removed/Event Note
chest tube/Event Note
Magdaleno removal/Event Note

## 2024-03-29 NOTE — CHART NOTE - NSCHARTNOTEFT_GEN_A_CORE
CT Removal:    Pt seen and examined at bedside.  Case discussed with Dr. Rhoades.  Minimal output from mediastinal tube.  No air leak appreciated.  CT removed without incident per Dr. Rhoades request.  Occlusive DSD placed.  CXR no obvious PTX noted.  Pt tolerated procedure well.
Admitting Diagnosis:   Patient is a 73y old  Female who presents with a chief complaint of severe AS (28 Mar 2024 12:25)      PAST MEDICAL & SURGICAL HISTORY:  Aortic stenosis      HTN (hypertension)      DM (diabetes mellitus)      Hyperlipidemia      Anemia      No significant past surgical history          Current Nutrition Order:  Consistent Carbohydrate   Glucerna x3/day 220kcal/10gm prot per 1 can      PO Intake: Good (%) [  X ]  Fair (50-75%) [   ] Poor (<25%) [   ]    GI Issues:   BM+ 3/27, Denies NVDC  Ordered for SENNA MIRALAX Protonix glycopyrrolate    Pain: none per flow sheets     Skin Integrity:  1+ Generalized Edema   Timur 19   MSI Site noted - no pressure ulcers       03-27-24 @ 07:01  -  03-28-24 @ 07:00  --------------------------------------------------------  IN: 690 mL / OUT: 1605 mL / NET: -915 mL    03-28-24 @ 07:01  -  03-28-24 @ 16:45  --------------------------------------------------------  IN: 105 mL / OUT: 255 mL / NET: -150 mL        Labs:   03-28    142  |  102  |  17  ----------------------------<  191<H>  4.0   |  28  |  0.73    Ca    9.4      28 Mar 2024 03:29  Phos  3.4     03-28  Mg     2.2     03-28    TPro  5.6<L>  /  Alb  3.3  /  TBili  0.5  /  DBili  x   /  AST  25  /  ALT  59<H>  /  AlkPhos  101  03-28    CAPILLARY BLOOD GLUCOSE      POCT Blood Glucose.: 190 mg/dL (28 Mar 2024 16:10)  POCT Blood Glucose.: 294 mg/dL (28 Mar 2024 11:14)  POCT Blood Glucose.: 176 mg/dL (28 Mar 2024 06:12)  POCT Blood Glucose.: 166 mg/dL (27 Mar 2024 22:13)  POCT Blood Glucose.: 190 mg/dL (27 Mar 2024 17:09)      Medications:  MEDICATIONS  (STANDING):  aMIOdarone    Tablet 200 milliGRAM(s) Oral every 8 hours  aMIOdarone    Tablet   Oral   aspirin enteric coated 81 milliGRAM(s) Oral daily  buMETAnide Injectable 1 milliGRAM(s) IV Push every 8 hours  cefTRIAXone   IVPB 1000 milliGRAM(s) IV Intermittent every 24 hours  chlorhexidine 2% Cloths 1 Application(s) Topical daily  dextrose 5%. 1000 milliLiter(s) (100 mL/Hr) IV Continuous <Continuous>  dextrose 5%. 1000 milliLiter(s) (50 mL/Hr) IV Continuous <Continuous>  dextrose 50% Injectable 25 Gram(s) IV Push once  dextrose 50% Injectable 50 milliLiter(s) IV Push every 15 minutes  dextrose 50% Injectable 25 milliLiter(s) IV Push every 15 minutes  glucagon  Injectable 1 milliGRAM(s) IntraMuscular once  glycopyrrolate 9 MICROgram(s)/formoterol 4.8 MICROgram(s) Inhaler 2 Puff(s) Inhalation two times a day  heparin   Injectable 5000 Unit(s) SubCutaneous every 8 hours  insulin glargine Injectable (LANTUS) 6 Unit(s) SubCutaneous at bedtime  insulin lispro (ADMELOG) corrective regimen sliding scale   SubCutaneous Before meals and at bedtime  insulin lispro Injectable (ADMELOG) 4 Unit(s) SubCutaneous three times a day before meals  pantoprazole    Tablet 40 milliGRAM(s) Oral before breakfast  polyethylene glycol 3350 17 Gram(s) Oral daily  potassium chloride    Tablet ER 20 milliEquivalent(s) Oral daily  senna 2 Tablet(s) Oral at bedtime  sodium chloride 0.9%. 1000 milliLiter(s) (10 mL/Hr) IV Continuous <Continuous>  testosterone 1% Gel 50 milliGRAM(s) Topical daily    MEDICATIONS  (PRN):  acetaminophen     Tablet .. 650 milliGRAM(s) Oral every 6 hours PRN Mild Pain (1 - 3)  dextrose Oral Gel 15 Gram(s) Oral once PRN Blood Glucose LESS THAN 70 milliGRAM(s)/deciliter      Height for BMI (FEET)	5 Feet  Height for BMI (INCHES)	2 Inch(s)  Height for BMI (CENTIMETERS)	157.48 Centimeter(s)  Weight for BMI (lbs)	123.2 lb  Weight for BMI (kg)	55.9 kg  Body Mass Index	22.5  Ideal Body Weight 110 pounds (%[id number])    Weight Change:   3/12 123.2 pounds  - Wt stable during admit     Estimated energy needs:   Current body wt used for energy calculations as pt falls within % IBW  Adjust for age, s/p OR   25-30kcal/kg 1400-1680kcal/day  1.2-1.4gm/kg 68-78gm prot/day   30-35ml/kg [id number]/day     Subjective: 73F with PMHx DM, severe AS, HTN, HLD, anemia who was admitted to [facility] with syncope. CT negative for CVA. Echo showed severe AS. Transferred to [facility] for evaluation for TAVR vs BAV. Upon review of structural scans, patient was deemed an inappropriate candidate for TAVR secondary to size of aorta. CTS consulted for evaluation for AVR. On 3/14/24 cardiac cath: pRCA 50%. S/p Aortic root replacement, sternotomy 3/19. Extuabted 3/19. On 3/24 Abx de-escalated based on bronch specimen - normal olayinka. Left pigtail removed 3/27.     Pt seen on 9EAST for follow up assessment. Resting in chair - limited visit as pt wanting to rest. +HFNC in place.  + POCT 190 294 176 (Suggested goal s/p OR/ in ICU: 110-140), Endo remains following. Ordered for 6U Insulin Glargine bedtime, 4U ADMELOG premeals, ADMELOG correction. Pt reports eating well at this time without issues to report. Tolerating meals without issues chewing/swallowing. Has also been consuming PO supplements on tray - does report not liking as much as BOOST for DM which she takes at home. Suggested change to ensure MAX however pt declined trial.   See nutrition recommendations. RD to remain available. Paged team.     Prior PES: Altered Nutrition Related Lab Values RT limited compliance with consistent carbohydrate diet AEB HgbA1c 9.6%  New PES: Increased nutirent needs RT increased Demands AEB: s/p OR   GOAL: Pt diet to align with nutrition recommendations while consistently meeting >75% nutrient needs.    Recommendations:  1. Add DASH To current diet.  -- Alexander pt food preferences as able.  2. Monitor PO intake/appetite, GI distress, diet tolerance, weights.  3. Labs: monitor BMP, CBC, glucose, lytes - Replete PRN, trend renal indices, LFTs, POCT.   4. RD to remain available for additional nutrition interventions as needed.     Education: Encouraged PO.     Risk Level: High [ XX  ] Moderate [   ] Low [   ]
Left pigtail removed.  Pt tolerated well.  CXR ordered.  Rt side drained a little, will likely remove later today
Pt exhibiting intrinsic heart rate and rhythm.  Per Dr. Rhoades pacing wires removed at bedside after cleaning in usual sterile fashion.  No acute issues.  Pt tolerated the procedure well. Nursing staff was notified. Pt remain to bed rest and blood pressure was checked [id number] hrs, Q30min for the last two hours.
Pt seen and examined at bedside.   Minimal output from CT  No air leak appreciated with valsalva. CXR stable without pleural effusion. Per Dr. Rhoades chest tubes x 2 removed.  A tie down was secured in place and an occlusive dressing applied. Pt tolerated the procedure well and remained HD stable.  Follow up CXR showed no obvious PTX, awaiting official read.
Pt was transferred to floor early in AM. Upon arriving this morning, PA team evaluated patient and saw LFTS elevated, no urine output and Lactate 3.3, newly A/V pacing. Theodore placed, only 200cc from a 12 hours TOV. Alerted ICU team. ECHO showing mild RV dysfuction, trivial effusion. Decision to bring patient back to ICU to have new Central line placed to start .

## 2024-03-29 NOTE — PROGRESS NOTE ADULT - SUBJECTIVE AND OBJECTIVE BOX
SUBJECTIVE / INTERVAL HPI: Patient was seen and examined this morning. Feeling well. Appetite back at baseline. Transferred to step down.     CAPILLARY BLOOD GLUCOSE & INSULIN RECEIVED  190 mg/dL (03-28 @ 16:10) - Lispro 4-2. Ate full meal and glucerna.  201 mg/dL (03-28 @ 22:13) - Lantus 6 + lispro 4  178 mg/dL (03-29 @ 03:09)  179 mg/dL (03-29 @ 07:00) - Lispro 4+2. Ate cereal, fruit cup, and glucerna.  235 mg/dL (03-29 @ 11:24)      REVIEW OF SYSTEMS  Constitutional:  Negative fever, chills or loss of appetite.  Eyes:  Negative blurry vision or double vision.  Cardiovascular:  Negative for chest pain or palpitations.  Respiratory:  Negative for cough, wheezing, or shortness of breath.    Gastrointestinal:  Negative for nausea, vomiting, diarrhea, constipation, or abdominal pain.  Genitourinary:  Negative frequency, urgency or dysuria.  Neurologic:  No headache, confusion, dizziness, lightheadedness.    PHYSICAL EXAM  Vital Signs Last 24 Hrs  T(C): 36.4 (29 Mar 2024 09:35), Max: 36.8 (28 Mar 2024 21:18)  T(F): 97.6 (29 Mar 2024 09:35), Max: 98.2 (28 Mar 2024 21:18)  HR: 106 (29 Mar 2024 09:23) (80 - 124)  BP: 154/54 (29 Mar 2024 09:23) (94/53 - 154/54)  BP(mean): 78 (29 Mar 2024 09:23) (68 - 90)  RR: 18 (29 Mar 2024 09:00) (16 - 18)  SpO2: 91% (29 Mar 2024 09:23) (91% - 100%)    Parameters below as of 29 Mar 2024 09:00  Patient On (Oxygen Delivery Method): room air    Constitutional: Awake, alert, in no acute distress.   HEENT: Normocephalic, atraumatic, GEORGETTE, no proptosis or lid retraction.   Neck: supple, no acanthosis, no thyromegaly or palpable thyroid nodules.  Respiratory: Lungs clear to ausculation bilaterally.  Cardiovascular: regular rhythm, normal S1 and S2, + murmur + sternotomy  GI: soft, non-tender, non-distended, bowel sounds present, no masses appreciated.  Extremities: No lower extremity edema, peripheral pulses present.   Skin: no rashes.   Psychiatric: AAO x 3. Normal affect/mood.     LABS  CBC - WBC/HGB/HTC/PLT: 12.22/9.2/29.8/469 (03-29-24)  BMP - Na/K/Cl/Bicarb/BUN/Cr/Gluc/AG/eGFR: 143/4.3/103/24/20/0.77/178/16/82 (03-29-24)  Ca - 9.4 (03-29-24)  Phos - 3.9 (03-29-24)  Mg - 2.6 (03-29-24)  LFT - Alb/Tprot/Tbili/Dbili/AlkPhos/ALT/AST: 3.2/--/0.4/--/93/44/29 (03-29-24)  PT/aPTT/INR: 13.5/25.9/1.19 (03-29-24)   Thyroid Stimulating Hormone, Serum: 1.530 (03-12-24)      MEDICATIONS  MEDICATIONS  (STANDING):  aMIOdarone    Tablet   Oral   aMIOdarone    Tablet 200 milliGRAM(s) Oral every 8 hours  buMETAnide 2 milliGRAM(s) Oral two times a day  cefTRIAXone   IVPB 1000 milliGRAM(s) IV Intermittent every 24 hours  chlorhexidine 2% Cloths 1 Application(s) Topical daily  dextrose 5%. 1000 milliLiter(s) (100 mL/Hr) IV Continuous <Continuous>  dextrose 5%. 1000 milliLiter(s) (50 mL/Hr) IV Continuous <Continuous>  dextrose 50% Injectable 50 milliLiter(s) IV Push every 15 minutes  dextrose 50% Injectable 25 milliLiter(s) IV Push every 15 minutes  dextrose 50% Injectable 25 Gram(s) IV Push once  glucagon  Injectable 1 milliGRAM(s) IntraMuscular once  glycopyrrolate 9 MICROgram(s)/formoterol 4.8 MICROgram(s) Inhaler 2 Puff(s) Inhalation two times a day  insulin glargine Injectable (LANTUS) 6 Unit(s) SubCutaneous at bedtime  insulin lispro Injectable (ADMELOG) 4 Unit(s) SubCutaneous three times a day before meals  pantoprazole    Tablet 40 milliGRAM(s) Oral before breakfast  polyethylene glycol 3350 17 Gram(s) Oral daily  potassium chloride    Tablet ER 20 milliEquivalent(s) Oral daily  senna 2 Tablet(s) Oral at bedtime  sodium chloride 0.9% lock flush 3 milliLiter(s) IV Push every 8 hours    MEDICATIONS  (PRN):  acetaminophen     Tablet .. 650 milliGRAM(s) Oral every 6 hours PRN Mild Pain (1 - 3)  dextrose Oral Gel 15 Gram(s) Oral once PRN Blood Glucose LESS THAN 70 milliGRAM(s)/deciliter

## 2024-03-29 NOTE — PROGRESS NOTE ADULT - ASSESSMENT
74 y/o female PMH DM, severe AS, EF 60%, HTN, HLD, anemia who was admitted to [facility] with syncope. CT negative for CVA, but echo showed severe AS s/p SAVR 3/19/2024.    Endocrine consulted for T2 diabetes management. Attempted basal + tradjenta for 1 day, but tradjenta did not control mealtime glucose.  Home meds: Janumet  BID and glipizide ER 10mg daily. She declines CGM.    A1C: 9.6 %  C-peptide 1.8 with  - March 2024  BUN: 20  Creatinine: 0.77  GFR: 82  Weight: 55.9  BMI: 22.5  EF: 60%

## 2024-03-29 NOTE — PROGRESS NOTE ADULT - SUBJECTIVE AND OBJECTIVE BOX
Patient discussed on morning rounds with Dr. Rhoades     Operation / Date: 3/19/24 sternotomy with aortic root replacement (21mm Konect) EF 60%    SUBJECTIVE ASSESSMENT:  73y Female seen and examined at bedside without complaints. Pt denies dizziness, vision changes, chest pain, palpitations, shortness of breath, cough, n/v/d, extremity swelling, calf tenderness.     Vital Signs Last 24 Hrs  T(C): 36.5 (29 Mar 2024 14:27), Max: 36.8 (28 Mar 2024 21:18)  T(F): 97.7 (29 Mar 2024 14:27), Max: 98.2 (28 Mar 2024 21:18)  HR: 114 (29 Mar 2024 15:45) (80 - 124)  BP: 115/63 (29 Mar 2024 15:45) (94/53 - 154/54)  BP(mean): 81 (29 Mar 2024 15:45) (68 - 90)  RR: 16 (29 Mar 2024 15:45) (16 - 18)  SpO2: 93% (29 Mar 2024 15:45) (91% - 97%)    Parameters below as of 29 Mar 2024 15:45  Patient On (Oxygen Delivery Method): room air    I&O's Detail    28 Mar 2024 07:01  -  29 Mar 2024 07:00  --------------------------------------------------------  IN:    IV PiggyBack: 50 mL    Oral Fluid: 300 mL    sodium chloride 0.9%: 5 mL  Total IN: 355 mL    OUT:    Chest Tube (mL): 5 mL    Voided (mL): 800 mL  Total OUT: 805 mL    Total NET: -450 mL    29 Mar 2024 07:01  -  29 Mar 2024 17:13  --------------------------------------------------------  IN:    Oral Fluid: 120 mL  Total IN: 120 mL    OUT:    Voided (mL): 250 mL  Total OUT: 250 mL    Total NET: -130 mL    CHEST TUBE:  none   MOHSEN DRAIN:  none   EPICARDIAL WIRES: none   TIE DOWNS: none   TEJEDA: none     PHYSICAL EXAM:  GEN: NAD, looks comfortable  Psych: Mood appropriate  Neuro: A&Ox3.  No focal deficits.  Moving all extremities.   HEENT: No obvious abnormalities  CV: S1S2, regular, no murmurs appreciated.  No carotid bruits.  No JVD  Lungs: Clear B/L.  No wheezing, rales or rhonchi  ABD: Soft, non-tender, non-distended.    EXT: Warm and well perfused.  No peripheral edema noted  Musculoskeletal: Moving all extremities with normal ROM, no joint swelling  Incisions: MSI clean dry and intact no sternal click, no drainage or bleeding noted. Drain sites are clean dry and intact without drainage or bleeding noted.     LABS:                        9.2    12.22 )-----------( 469      ( 29 Mar 2024 03:09 )             29.8       PT/INR - ( 29 Mar 2024 03:09 )   PT: 13.5 sec;   INR: 1.19          PTT - ( 29 Mar 2024 03:09 )  PTT:25.9 sec    03-29    143  |  103  |  20  ----------------------------<  178<H>  4.3   |  24  |  0.77    Ca    9.4      29 Mar 2024 03:09  Phos  3.9     03-29  Mg     2.6     03-29    TPro  6.0  /  Alb  3.2<L>  /  TBili  0.4  /  DBili  x   /  AST  29  /  ALT  44  /  AlkPhos  93  03-29    Urinalysis Basic - ( 29 Mar 2024 03:09 )    Color: x / Appearance: x / SG: x / pH: x  Gluc: 178 mg/dL / Ketone: x  / Bili: x / Urobili: x   Blood: x / Protein: x / Nitrite: x   Leuk Esterase: x / RBC: x / WBC x   Sq Epi: x / Non Sq Epi: x / Bacteria: x    MEDICATIONS  (STANDING):  aMIOdarone    Tablet 200 milliGRAM(s) Oral every 8 hours  aMIOdarone    Tablet   Oral   apixaban 5 milliGRAM(s) Oral every 12 hours  buMETAnide 2 milliGRAM(s) Oral two times a day  cefTRIAXone   IVPB 1000 milliGRAM(s) IV Intermittent every 24 hours  chlorhexidine 2% Cloths 1 Application(s) Topical daily  dextrose 5%. 1000 milliLiter(s) (100 mL/Hr) IV Continuous <Continuous>  dextrose 5%. 1000 milliLiter(s) (50 mL/Hr) IV Continuous <Continuous>  dextrose 50% Injectable 25 Gram(s) IV Push once  dextrose 50% Injectable 25 milliLiter(s) IV Push every 15 minutes  dextrose 50% Injectable 50 milliLiter(s) IV Push every 15 minutes  glucagon  Injectable 1 milliGRAM(s) IntraMuscular once  glycopyrrolate 9 MICROgram(s)/formoterol 4.8 MICROgram(s) Inhaler 2 Puff(s) Inhalation two times a day  insulin glargine Injectable (LANTUS) 10 Unit(s) SubCutaneous at bedtime  insulin lispro Injectable (ADMELOG) 8 Unit(s) SubCutaneous three times a day before meals  pantoprazole    Tablet 40 milliGRAM(s) Oral before breakfast  polyethylene glycol 3350 17 Gram(s) Oral daily  potassium chloride    Tablet ER 20 milliEquivalent(s) Oral daily  senna 2 Tablet(s) Oral at bedtime  sodium chloride 0.9% lock flush 3 milliLiter(s) IV Push every 8 hours    MEDICATIONS  (PRN):  acetaminophen     Tablet .. 650 milliGRAM(s) Oral every 6 hours PRN Mild Pain (1 - 3)  dextrose Oral Gel 15 Gram(s) Oral once PRN Blood Glucose LESS THAN 70 milliGRAM(s)/deciliter    RADIOLOGY & ADDITIONAL TESTS:  < from: Xray Chest 1 View-PORTABLE IMMEDIATE (Xray Chest 1 View-PORTABLE IMMEDIATE .) (03.28.24 @ 11:35) >    IMPRESSION:    Mediastinum chest tube and right venous catheterremoved since prior exam   earlier same day. No pneumothorax. Small pleural effusions and minimal   lower lung focal atelectasis and postop changes again noted. No acute   infiltrate appearing.    --- End of Report ---    < end of copied text >

## 2024-03-30 LAB
ANION GAP SERPL CALC-SCNC: 14 MMOL/L — SIGNIFICANT CHANGE UP (ref 5–17)
BUN SERPL-MCNC: 25 MG/DL — HIGH (ref 7–23)
CALCIUM SERPL-MCNC: 9.9 MG/DL — SIGNIFICANT CHANGE UP (ref 8.4–10.5)
CHLORIDE SERPL-SCNC: 100 MMOL/L — SIGNIFICANT CHANGE UP (ref 96–108)
CO2 SERPL-SCNC: 24 MMOL/L — SIGNIFICANT CHANGE UP (ref 22–31)
CREAT SERPL-MCNC: 0.9 MG/DL — SIGNIFICANT CHANGE UP (ref 0.5–1.3)
DIGOXIN SERPL-MCNC: 0.6 NG/ML — LOW (ref 0.8–2)
EGFR: 68 ML/MIN/1.73M2 — SIGNIFICANT CHANGE UP
GLUCOSE BLDC GLUCOMTR-MCNC: 123 MG/DL — HIGH (ref 70–99)
GLUCOSE BLDC GLUCOMTR-MCNC: 133 MG/DL — HIGH (ref 70–99)
GLUCOSE BLDC GLUCOMTR-MCNC: 216 MG/DL — HIGH (ref 70–99)
GLUCOSE BLDC GLUCOMTR-MCNC: 253 MG/DL — HIGH (ref 70–99)
GLUCOSE BLDC GLUCOMTR-MCNC: 331 MG/DL — HIGH (ref 70–99)
GLUCOSE BLDC GLUCOMTR-MCNC: 77 MG/DL — SIGNIFICANT CHANGE UP (ref 70–99)
GLUCOSE SERPL-MCNC: 227 MG/DL — HIGH (ref 70–99)
HCT VFR BLD CALC: 30.7 % — LOW (ref 34.5–45)
HGB BLD-MCNC: 9.2 G/DL — LOW (ref 11.5–15.5)
MAGNESIUM SERPL-MCNC: 2 MG/DL — SIGNIFICANT CHANGE UP (ref 1.6–2.6)
MCHC RBC-ENTMCNC: 26.4 PG — LOW (ref 27–34)
MCHC RBC-ENTMCNC: 30 GM/DL — LOW (ref 32–36)
MCV RBC AUTO: 88.2 FL — SIGNIFICANT CHANGE UP (ref 80–100)
NRBC # BLD: 0 /100 WBCS — SIGNIFICANT CHANGE UP (ref 0–0)
PLATELET # BLD AUTO: 433 K/UL — HIGH (ref 150–400)
POTASSIUM SERPL-MCNC: 4 MMOL/L — SIGNIFICANT CHANGE UP (ref 3.5–5.3)
POTASSIUM SERPL-SCNC: 4 MMOL/L — SIGNIFICANT CHANGE UP (ref 3.5–5.3)
RBC # BLD: 3.48 M/UL — LOW (ref 3.8–5.2)
RBC # FLD: 16.3 % — HIGH (ref 10.3–14.5)
SODIUM SERPL-SCNC: 138 MMOL/L — SIGNIFICANT CHANGE UP (ref 135–145)
WBC # BLD: 15.03 K/UL — HIGH (ref 3.8–10.5)
WBC # FLD AUTO: 15.03 K/UL — HIGH (ref 3.8–10.5)

## 2024-03-30 PROCEDURE — 71046 X-RAY EXAM CHEST 2 VIEWS: CPT | Mod: 26

## 2024-03-30 PROCEDURE — 93010 ELECTROCARDIOGRAM REPORT: CPT

## 2024-03-30 PROCEDURE — 71045 X-RAY EXAM CHEST 1 VIEW: CPT | Mod: 26,XE

## 2024-03-30 PROCEDURE — 99232 SBSQ HOSP IP/OBS MODERATE 35: CPT

## 2024-03-30 PROCEDURE — 99222 1ST HOSP IP/OBS MODERATE 55: CPT

## 2024-03-30 RX ORDER — INSULIN LISPRO 100/ML
12 VIAL (ML) SUBCUTANEOUS
Refills: 0 | Status: DISCONTINUED | OUTPATIENT
Start: 2024-03-30 | End: 2024-03-31

## 2024-03-30 RX ORDER — INSULIN GLARGINE 100 [IU]/ML
16 INJECTION, SOLUTION SUBCUTANEOUS AT BEDTIME
Refills: 0 | Status: DISCONTINUED | OUTPATIENT
Start: 2024-03-30 | End: 2024-03-31

## 2024-03-30 RX ORDER — METOPROLOL TARTRATE 50 MG
12.5 TABLET ORAL EVERY 6 HOURS
Refills: 0 | Status: DISCONTINUED | OUTPATIENT
Start: 2024-03-30 | End: 2024-03-31

## 2024-03-30 RX ADMIN — Medication 8 UNIT(S): at 07:54

## 2024-03-30 RX ADMIN — Medication 6: at 07:31

## 2024-03-30 RX ADMIN — AMIODARONE HYDROCHLORIDE 200 MILLIGRAM(S): 400 TABLET ORAL at 22:05

## 2024-03-30 RX ADMIN — GLYCOPYRROLATE AND FORMOTEROL FUMARATE 2 PUFF(S): 9; 4.8 AEROSOL, METERED RESPIRATORY (INHALATION) at 10:10

## 2024-03-30 RX ADMIN — PANTOPRAZOLE SODIUM 40 MILLIGRAM(S): 20 TABLET, DELAYED RELEASE ORAL at 06:08

## 2024-03-30 RX ADMIN — Medication 20 MILLIEQUIVALENT(S): at 12:08

## 2024-03-30 RX ADMIN — Medication 12.5 MILLIGRAM(S): at 12:08

## 2024-03-30 RX ADMIN — INSULIN GLARGINE 16 UNIT(S): 100 INJECTION, SOLUTION SUBCUTANEOUS at 22:05

## 2024-03-30 RX ADMIN — SODIUM CHLORIDE 3 MILLILITER(S): 9 INJECTION INTRAMUSCULAR; INTRAVENOUS; SUBCUTANEOUS at 05:27

## 2024-03-30 RX ADMIN — SODIUM CHLORIDE 3 MILLILITER(S): 9 INJECTION INTRAMUSCULAR; INTRAVENOUS; SUBCUTANEOUS at 17:31

## 2024-03-30 RX ADMIN — SODIUM CHLORIDE 3 MILLILITER(S): 9 INJECTION INTRAMUSCULAR; INTRAVENOUS; SUBCUTANEOUS at 21:13

## 2024-03-30 RX ADMIN — AMIODARONE HYDROCHLORIDE 200 MILLIGRAM(S): 400 TABLET ORAL at 14:14

## 2024-03-30 RX ADMIN — Medication 12.5 MILLIGRAM(S): at 17:19

## 2024-03-30 RX ADMIN — Medication 4: at 12:08

## 2024-03-30 RX ADMIN — Medication 12 UNIT(S): at 12:08

## 2024-03-30 RX ADMIN — APIXABAN 5 MILLIGRAM(S): 2.5 TABLET, FILM COATED ORAL at 06:08

## 2024-03-30 RX ADMIN — CHLORHEXIDINE GLUCONATE 1 APPLICATION(S): 213 SOLUTION TOPICAL at 06:09

## 2024-03-30 RX ADMIN — Medication 12 UNIT(S): at 17:20

## 2024-03-30 RX ADMIN — BUMETANIDE 2 MILLIGRAM(S): 0.25 INJECTION INTRAMUSCULAR; INTRAVENOUS at 06:08

## 2024-03-30 RX ADMIN — APIXABAN 5 MILLIGRAM(S): 2.5 TABLET, FILM COATED ORAL at 17:20

## 2024-03-30 RX ADMIN — BUMETANIDE 2 MILLIGRAM(S): 0.25 INJECTION INTRAMUSCULAR; INTRAVENOUS at 15:06

## 2024-03-30 RX ADMIN — AMIODARONE HYDROCHLORIDE 200 MILLIGRAM(S): 400 TABLET ORAL at 06:08

## 2024-03-30 RX ADMIN — GLYCOPYRROLATE AND FORMOTEROL FUMARATE 2 PUFF(S): 9; 4.8 AEROSOL, METERED RESPIRATORY (INHALATION) at 22:10

## 2024-03-30 NOTE — PROGRESS NOTE ADULT - ASSESSMENT
Patient is a 72 yo woman with uncontrolled T2DM with an A1c of 9.6% severe AS, EF 60%, HTN, HLD, anemia who was admitted to [facility] with syncope. CT negative for CVA, but echo showed severe AS s/p SAVR 3/19/2024.

## 2024-03-30 NOTE — PROGRESS NOTE ADULT - PROBLEM SELECTOR PLAN 1
-patient has a good appetite and eating foods.  Her glucose levels are above goal of 100-180  -continue consistent carbohydrate diet  -increase Lantus to 16 units at bedtime  -increase lispro to 12 units TID with meals  -consistent carbohydrate diet  -hypoglycemia protocol  -the patient has high insulin needs and it is unlikely oral medicines alone will control sugars outpatient.  While final discharge diabetes recommendations are pending, recommend nutritional consultation, teaching of glucometer and teaching on insulin injections in case she needs that for discharge.  Plan discussed with the primary team

## 2024-03-30 NOTE — CONSULT NOTE ADULT - ASSESSMENT
74 yo female with DM, aortic stenosis, HTN, hyperlipidemia, anemia who underwent sternotomy with aortic root replacement on 3/19/2024.  Post-op afib.  Previously had some bradycardia with beta-blocker and amiodarone.    - agree with low-dose beta-blocker.  - continue amiodarone for a month post-op to try to decrease recurrence of AF  - she is on Eliquis for post-op atrial fibrillation.  continue this for the short-term while on treatment for AF.  - monitor on telemetry.  repeat EKG to assess for conduction disease.  QRS appears narrow on telemetry.  - recommendations were discussed with patient and with CT surgery team.

## 2024-03-30 NOTE — PROGRESS NOTE ADULT - SUBJECTIVE AND OBJECTIVE BOX
NO acute events overnight. Patient's glucose levels are running high. She is eating soft foods, no nausea/vomiting. Endocrine following for diabetes    MEDICATIONS  (STANDING):  aMIOdarone    Tablet   Oral   aMIOdarone    Tablet 200 milliGRAM(s) Oral every 8 hours  apixaban 5 milliGRAM(s) Oral every 12 hours  buMETAnide 2 milliGRAM(s) Oral two times a day  chlorhexidine 2% Cloths 1 Application(s) Topical daily  dextrose 5%. 1000 milliLiter(s) (50 mL/Hr) IV Continuous <Continuous>  dextrose 5%. 1000 milliLiter(s) (100 mL/Hr) IV Continuous <Continuous>  dextrose 50% Injectable 25 milliLiter(s) IV Push every 15 minutes  dextrose 50% Injectable 50 milliLiter(s) IV Push every 15 minutes  dextrose 50% Injectable 25 Gram(s) IV Push once  glucagon  Injectable 1 milliGRAM(s) IntraMuscular once  glycopyrrolate 9 MICROgram(s)/formoterol 4.8 MICROgram(s) Inhaler 2 Puff(s) Inhalation two times a day  insulin glargine Injectable (LANTUS) 10 Unit(s) SubCutaneous at bedtime  insulin lispro (ADMELOG) corrective regimen sliding scale   SubCutaneous Before meals and at bedtime  insulin lispro Injectable (ADMELOG) 8 Unit(s) SubCutaneous three times a day before meals  metoprolol tartrate 12.5 milliGRAM(s) Oral every 6 hours  pantoprazole    Tablet 40 milliGRAM(s) Oral before breakfast  polyethylene glycol 3350 17 Gram(s) Oral daily  potassium chloride    Tablet ER 20 milliEquivalent(s) Oral daily  senna 2 Tablet(s) Oral at bedtime  sodium chloride 0.9% lock flush 3 milliLiter(s) IV Push every 8 hours    MEDICATIONS  (PRN):  acetaminophen     Tablet .. 650 milliGRAM(s) Oral every 6 hours PRN Mild Pain (1 - 3)  dextrose Oral Gel 15 Gram(s) Oral once PRN Blood Glucose LESS THAN 70 milliGRAM(s)/deciliter      Allergies  No Known Allergies    Review of Systems:  Constitutional: No fever  HEENT: No pain  Cardiovascular: No chest pain, palpitations  Respiratory: No SOB, no cough  GI: No nausea, vomiting, abdominal pain  Psych: no depression    PHYSICAL EXAM:  VITALS: T(C): 36.3 (03-30-24 @ 08:53)  T(F): 97.3 (03-30-24 @ 08:53), Max: 98 (03-29-24 @ 21:56)  HR: 110 (03-30-24 @ 10:12) (92 - 118)  BP: 95/47 (03-30-24 @ 10:12) (95/47 - 143/54)  RR:  (16 - 18)  SpO2:  (91% - 96%)  Wt(kg): --  GENERAL: NAD, well-groomed, well-developed  EYES: No proptosis, no lid lag, anicteric  HEENT:  Adentulous, moist mucous membranes  RESPIRATORY: Respirations even and unlabored  CARDIOVASCULAR: Regular rate   GI: Soft, nontender, non distended, normal bowel sounds  MUSCULOSKELETAL: sitting in chair  PSYCH: Alert, reactive affect, normal mood  CUSHING'S SIGNS: no striae    POCT Blood Glucose.: 253 mg/dL (03-30-24 @ 07:07)  Admelog 8+6  POCT Blood Glucose.: 331 mg/dL (03-30-24 @ 00:43)  POCT Blood Glucose.: 411 mg/dL (03-29-24 @ 22:59)  Lantus 10 + Admelog 12  POCT Blood Glucose.: 420 mg/dL (03-29-24 @ 22:53)  POCT Blood Glucose.: 360 mg/dL (03-29-24 @ 21:45)  POCT Blood Glucose.: 195 mg/dL (03-29-24 @ 16:54)  Admelog 8  POCT Blood Glucose.: 235 mg/dL (03-29-24 @ 11:24)  Admelog 4  POCT Blood Glucose.: 179 mg/dL (03-29-24 @ 07:00)  Admelog 4+2  POCT Blood Glucose.: 201 mg/dL (03-28-24 @ 22:13)  POCT Blood Glucose.: 190 mg/dL (03-28-24 @ 16:10)  POCT Blood Glucose.: 294 mg/dL (03-28-24 @ 11:14)  POCT Blood Glucose.: 176 mg/dL (03-28-24 @ 06:12)  POCT Blood Glucose.: 166 mg/dL (03-27-24 @ 22:13)  POCT Blood Glucose.: 190 mg/dL (03-27-24 @ 17:09)  POCT Blood Glucose.: 83 mg/dL (03-27-24 @ 11:41)      03-30    138  |  100  |  25<H>  ----------------------------<  227<H>  4.0   |  24  |  0.90    eGFR: 68    Ca    9.9      03-30  Mg     2.0     03-30  Phos  3.9     03-29    TPro  6.0  /  Alb  3.2<L>  /  TBili  0.4  /  DBili  x   /  AST  29  /  ALT  44  /  AlkPhos  93  03-29  Thyroid Function Tests:  03-12 @ 22:12 TSH 1.530

## 2024-03-30 NOTE — CONSULT NOTE ADULT - SUBJECTIVE AND OBJECTIVE BOX
EP Consult  74 yo female with DM, aortic stenosis, HTN, hyperlipidemia, anemia who underwent sternotomy with aortic root replacement on 3/19/2024.  She was initially admitted to [facility] with syncope, where echo showed severe AS.  Subsequently transferred to [facility] and underwent surgery.  Post op course notable for some bradycardia while on beta-blocker and amiodarone, and some inotrope requirement.  She has also been treated for tracheobronchitis, and has had post-operative atrial fibrillation.  She is in atrial fibrillation today.  Was in sinus rhythm with rates in the 80s yesterday evening.  She denies chest pain or significant dyspnea.  She was just started on low-dose beta-blocker.    TELEMETRY: as above.  AF rates low 100s.  Was in sinus with rates 80s yesterday evening    ECHO 3/23/24   1. Normal left ventricular size and systolic function.   2. Moderate symmetric left ventricular hypertrophy.   3. Mildly reduced right ventricular systolic function.   4. Mildly dilated left atrium.   5. Bioprosthetic valve is seen in the aortic position with apparent normal function, without evidence of prosthetic dysfunction.   6. No other significant valvular disease.   7. Pulmonary artery systolic pressure is 33 mmHg.   8. No pericardial effusion.   9. Left pleural effusion.  10. Compared to the previous TTE performed on 3/11/2024, patient is s/p AVR and right ventricle appears mildly reduced.    Home Medications:  amlodipine-benazepril 5 mg-10 mg oral capsule: 1 cap(s) orally once a day (13 Mar 2024 05:19)  aspirin 81 mg oral tablet, disintegrating: orally (13 Mar 2024 05:19)  glipiZIDE 10 mg oral tablet, extended release: 1 tab(s) orally once a day (13 Mar 2024 05:16)  Janumet 50 mg-1000 mg oral tablet: 1 tab(s) orally 2 times a day (13 Mar 2024 05:18)  Lipitor 40 mg oral tablet: 1 tab(s) orally once a day (13 Mar 2024 05:19)  timolol hemihydrate 0.5% ophthalmic solution: 1 drop(s) in each affected eye 2 times a day (13 Mar 2024 05:19)  Travatan 0.004% ophthalmic solution: 1 drop(s) in each affected eye once a day (13 Mar 2024 05:19)      MEDICATIONS  (STANDING):  aMIOdarone    Tablet   Oral   aMIOdarone    Tablet 200 milliGRAM(s) Oral every 8 hours  apixaban 5 milliGRAM(s) Oral every 12 hours  buMETAnide 2 milliGRAM(s) Oral two times a day  chlorhexidine 2% Cloths 1 Application(s) Topical daily  dextrose 5%. 1000 milliLiter(s) (100 mL/Hr) IV Continuous <Continuous>  dextrose 5%. 1000 milliLiter(s) (50 mL/Hr) IV Continuous <Continuous>  dextrose 50% Injectable 25 Gram(s) IV Push once  dextrose 50% Injectable 50 milliLiter(s) IV Push every 15 minutes  dextrose 50% Injectable 25 milliLiter(s) IV Push every 15 minutes  glucagon  Injectable 1 milliGRAM(s) IntraMuscular once  glycopyrrolate 9 MICROgram(s)/formoterol 4.8 MICROgram(s) Inhaler 2 Puff(s) Inhalation two times a day  insulin glargine Injectable (LANTUS) 16 Unit(s) SubCutaneous at bedtime  insulin lispro (ADMELOG) corrective regimen sliding scale   SubCutaneous Before meals and at bedtime  insulin lispro Injectable (ADMELOG) 12 Unit(s) SubCutaneous three times a day before meals  metoprolol tartrate 12.5 milliGRAM(s) Oral every 6 hours  pantoprazole    Tablet 40 milliGRAM(s) Oral before breakfast  polyethylene glycol 3350 17 Gram(s) Oral daily  potassium chloride    Tablet ER 20 milliEquivalent(s) Oral daily  senna 2 Tablet(s) Oral at bedtime  sodium chloride 0.9% lock flush 3 milliLiter(s) IV Push every 8 hours    MEDICATIONS  (PRN):  acetaminophen     Tablet .. 650 milliGRAM(s) Oral every 6 hours PRN Mild Pain (1 - 3)  dextrose Oral Gel 15 Gram(s) Oral once PRN Blood Glucose LESS THAN 70 milliGRAM(s)/deciliter    Allergies  No Known Allergies    Social History: non smoker    FAMILY HISTORY: noncontributory    ROS:  Full ROS was performed, and negative except as noted.    PHYSICAL EXAM:  VITAL SIGNS:  T(F): 97.8 (03-30-24 @ 13:54)  HR: 94 (03-30-24 @ 14:15)  BP: 99/46 (03-30-24 @ 14:15)  RR: 18 (03-30-24 @ 14:15)  SpO2: 95% (03-30-24 @ 14:15)    -nad, alert  -no carotid bruit  -irregular, no murmurs  -lungs clear bilaterally  -abd soft, nt  -no sig edema  -alert and conversant  -normal mood and affect  -no rash noted  -moves all extremities      LABS:                        9.2    15.03 )-----------( 433      ( 30 Mar 2024 06:46 )             30.7     03-30    138  |  100  |  25<H>  ----------------------------<  227<H>  4.0   |  24  |  0.90    Ca    9.9      30 Mar 2024 06:46  Phos  3.9     03-29  Mg     2.0     03-30    TPro  6.0  /  Alb  3.2<L>  /  TBili  0.4  /  DBili  x   /  AST  29  /  ALT  44  /  AlkPhos  93  03-29    PT/INR - ( 29 Mar 2024 03:09 )   PT: 13.5 sec;   INR: 1.19       PTT - ( 29 Mar 2024 03:09 )  PTT:25.9 sec    TSH 1.53 on 3/12/24

## 2024-03-30 NOTE — PROGRESS NOTE ADULT - ASSESSMENT
A: 73 yr female with a PMHx DM, severe AS, EF 60%, HTN, HLD, anemia who was admitted to [facility] with syncope. CT negative for CVA. subsequent echo showed severe AS. Transferred to [facility] under Dr. Moon for evaluation for TAVR vs BAV. On 3/19 pt underwent a sternotomy with aortic root replacement (21mm Konect) EF 60%. Post op recovered in the ICU, swan removed POD 1 and ambulated. became hypotensive and bradycardic while on BB and Amio requiring pacing via PW. BB stopped amio continued. POD 2 hard tubes removed and primacor remained. POD 3 Primacor removed, delined and TOV passed. POD 4 Tracheobroncitis started on Ceftriaxone and Azithro bronched. Primacor back on, POD 5 weaning  and primacor. In and out of Afib. Effusions and b/l pigtails placed. POD 6 pigtails removed. POD 7 floor care, on bumex PO and not on BB due to blood pressure. Per Dr. Rhoades no huseyin agentds due to hx of CHB. in and out of rapid afib, continue amio load (tolerating well, no CHB episodes). PW removed, Eliquis started this PM. EP consulted for patient and low dose BB started. Patient converted to sinus.     P:  Neurovascular: No delirium. Pain well controlled with current regimen.  -PRN's: Tylenol     Cardiovascular: -s/p root replacement 3/19  - Hemodynamically stable. Afib --> converted to NSR late morning   - c/w amio  - low dose metop started as per EP --> 12.5 BID   - c/w bumex 2 BID  - c/w statin     Respiratory: 02 Sat = 94% on RA.  -If on oxygen wean to RA from for O2 Sat > 93%.  -Encourage C+DB and Use of IS 10x / hr while awake.  -CXR: small b/l effusions R>L     GI: Stable.  -PPX: protonix  -PO Diet.  - senna/ miralax    Renal / :  -Monitor renal function (25/.9)  -Monitor I/O's.  - continue with Bumex 2 BID   - potassium supp 20meq daily   - supplement mg as needed     Endocrine: hx of DM -- endocrine following   -A1c: 9.6      - ISS, lantus, lispro per endo   -TSH: 1.530    Hematologic:  -CBC: 9.2/30.7  -Coagulation Panel PT/INR - ( 29 Mar 2024 03:09 )   PT: 13.5 sec;   INR: 1.19     PTT - ( 29 Mar 2024 03:09 )  PTT:25.9 sec  - eliquis for afib     ID:  -afebrile .  - WBC: 15.03    Prophylaxis:  -DVT prophylaxis with 5000 SubQ Heparin q8h.  -SCD's    Disposition:  - home when medically stable

## 2024-03-30 NOTE — PROGRESS NOTE ADULT - SUBJECTIVE AND OBJECTIVE BOX
Patient discussed on morning rounds with Dr. Gil    Operation / Date: Sternotomy with aortic root replacement (21mm konnect) 3/19/24    SUBJECTIVE ASSESSMENT:  73y Female seen and assessed at bedside. Patient offers no acute complaints at this time and states that she would like to go home.       Vital Signs Last 24 Hrs  T(C): 36.3 (30 Mar 2024 17:01), Max: 36.7 (29 Mar 2024 21:56)  T(F): 97.4 (30 Mar 2024 17:01), Max: 98 (29 Mar 2024 21:56)  HR: 94 (30 Mar 2024 14:15) (92 - 114)  BP: 99/46 (30 Mar 2024 14:15) (85/42 - 143/54)  BP(mean): 64 (30 Mar 2024 14:15) (60 - 85)  RR: 18 (30 Mar 2024 14:15) (16 - 18)  SpO2: 95% (30 Mar 2024 14:15) (91% - 96%)    Parameters below as of 30 Mar 2024 14:15  Patient On (Oxygen Delivery Method): room air      I&O's Detail    29 Mar 2024 07:01  -  30 Mar 2024 07:00  --------------------------------------------------------  IN:    Oral Fluid: 358 mL  Total IN: 358 mL    OUT:    Voided (mL): 750 mL  Total OUT: 750 mL    Total NET: -392 mL          CHEST TUBE:  no  MOHSEN DRAIN:  no.  EPICARDIAL WIRES: no.  TIE DOWNS: no.  TEJEDA: no.    PHYSICAL EXAM:  GEN: NAD, looks comfortable  Psych: Mood appropriate  Neuro: A&Ox3.  No focal deficits.  Moving all extremities.   HEENT: No obvious abnormalities  CV: S1S2, regular, no murmurs appreciated.  No carotid bruits.  No JVD  Lungs: Clear B/L.  No wheezing, rales or rhonchi  ABD: Soft, non-tender, non-distended.    EXT: Warm and well perfused.  No peripheral edema noted  Musculoskeletal: Moving all extremities with normal ROM, no joint swelling  Incisions: MSI clean dry and intact no sternal click, no drainage or bleeding noted. Drain sites are clean dry and intact without drainage or bleeding noted.     LABS:                        9.2    15.03 )-----------( 433      ( 30 Mar 2024 06:46 )             30.7       PT/INR - ( 29 Mar 2024 03:09 )   PT: 13.5 sec;   INR: 1.19          PTT - ( 29 Mar 2024 03:09 )  PTT:25.9 sec    03-30    138  |  100  |  25<H>  ----------------------------<  227<H>  4.0   |  24  |  0.90    Ca    9.9      30 Mar 2024 06:46  Phos  3.9     03-29  Mg     2.0     03-30    TPro  6.0  /  Alb  3.2<L>  /  TBili  0.4  /  DBili  x   /  AST  29  /  ALT  44  /  AlkPhos  93  03-29      Urinalysis Basic - ( 30 Mar 2024 06:46 )    Color: x / Appearance: x / SG: x / pH: x  Gluc: 227 mg/dL / Ketone: x  / Bili: x / Urobili: x   Blood: x / Protein: x / Nitrite: x   Leuk Esterase: x / RBC: x / WBC x   Sq Epi: x / Non Sq Epi: x / Bacteria: x        MEDICATIONS  (STANDING):  aMIOdarone    Tablet 200 milliGRAM(s) Oral every 8 hours  aMIOdarone    Tablet   Oral   apixaban 5 milliGRAM(s) Oral every 12 hours  buMETAnide 2 milliGRAM(s) Oral two times a day  chlorhexidine 2% Cloths 1 Application(s) Topical daily  dextrose 5%. 1000 milliLiter(s) (100 mL/Hr) IV Continuous <Continuous>  dextrose 5%. 1000 milliLiter(s) (50 mL/Hr) IV Continuous <Continuous>  dextrose 50% Injectable 25 Gram(s) IV Push once  dextrose 50% Injectable 25 milliLiter(s) IV Push every 15 minutes  dextrose 50% Injectable 50 milliLiter(s) IV Push every 15 minutes  glucagon  Injectable 1 milliGRAM(s) IntraMuscular once  glycopyrrolate 9 MICROgram(s)/formoterol 4.8 MICROgram(s) Inhaler 2 Puff(s) Inhalation two times a day  insulin glargine Injectable (LANTUS) 16 Unit(s) SubCutaneous at bedtime  insulin lispro (ADMELOG) corrective regimen sliding scale   SubCutaneous Before meals and at bedtime  insulin lispro Injectable (ADMELOG) 12 Unit(s) SubCutaneous three times a day before meals  metoprolol tartrate 12.5 milliGRAM(s) Oral every 6 hours  pantoprazole    Tablet 40 milliGRAM(s) Oral before breakfast  polyethylene glycol 3350 17 Gram(s) Oral daily  potassium chloride    Tablet ER 20 milliEquivalent(s) Oral daily  senna 2 Tablet(s) Oral at bedtime  sodium chloride 0.9% lock flush 3 milliLiter(s) IV Push every 8 hours    MEDICATIONS  (PRN):  acetaminophen     Tablet .. 650 milliGRAM(s) Oral every 6 hours PRN Mild Pain (1 - 3)  dextrose Oral Gel 15 Gram(s) Oral once PRN Blood Glucose LESS THAN 70 milliGRAM(s)/deciliter        RADIOLOGY & ADDITIONAL TESTS:     Patient discussed on morning rounds with Dr. Gil    Operation / Date: aortic root replacement (21mm konnect) 3/19/24    SUBJECTIVE ASSESSMENT:  73y Female seen and assessed at bedside. Patient offers no acute complaints at this time and states that she would like to go home.       Vital Signs Last 24 Hrs  T(C): 36.3 (30 Mar 2024 17:01), Max: 36.7 (29 Mar 2024 21:56)  T(F): 97.4 (30 Mar 2024 17:01), Max: 98 (29 Mar 2024 21:56)  HR: 94 (30 Mar 2024 14:15) (92 - 114)  BP: 99/46 (30 Mar 2024 14:15) (85/42 - 143/54)  BP(mean): 64 (30 Mar 2024 14:15) (60 - 85)  RR: 18 (30 Mar 2024 14:15) (16 - 18)  SpO2: 95% (30 Mar 2024 14:15) (91% - 96%)    Parameters below as of 30 Mar 2024 14:15  Patient On (Oxygen Delivery Method): room air      I&O's Detail    29 Mar 2024 07:01  -  30 Mar 2024 07:00  --------------------------------------------------------  IN:    Oral Fluid: 358 mL  Total IN: 358 mL    OUT:    Voided (mL): 750 mL  Total OUT: 750 mL    Total NET: -392 mL          CHEST TUBE:  no  MOHSEN DRAIN:  no.  EPICARDIAL WIRES: no.  TIE DOWNS: no.  TEJEDA: no.    PHYSICAL EXAM:  GEN: NAD, looks comfortable  Psych: Mood appropriate  Neuro: A&Ox3.  No focal deficits.  Moving all extremities.   HEENT: No obvious abnormalities  CV: S1S2, regular, no murmurs appreciated.  No carotid bruits.  No JVD  Lungs: Clear B/L.  No wheezing, rales or rhonchi  ABD: Soft, non-tender, non-distended.    EXT: Warm and well perfused.  No peripheral edema noted  Musculoskeletal: Moving all extremities with normal ROM, no joint swelling  Incisions: MSI clean dry and intact no sternal click, no drainage or bleeding noted. Drain sites are clean dry and intact without drainage or bleeding noted.     LABS:                        9.2    15.03 )-----------( 433      ( 30 Mar 2024 06:46 )             30.7       PT/INR - ( 29 Mar 2024 03:09 )   PT: 13.5 sec;   INR: 1.19          PTT - ( 29 Mar 2024 03:09 )  PTT:25.9 sec    03-30    138  |  100  |  25<H>  ----------------------------<  227<H>  4.0   |  24  |  0.90    Ca    9.9      30 Mar 2024 06:46  Phos  3.9     03-29  Mg     2.0     03-30    TPro  6.0  /  Alb  3.2<L>  /  TBili  0.4  /  DBili  x   /  AST  29  /  ALT  44  /  AlkPhos  93  03-29      Urinalysis Basic - ( 30 Mar 2024 06:46 )    Color: x / Appearance: x / SG: x / pH: x  Gluc: 227 mg/dL / Ketone: x  / Bili: x / Urobili: x   Blood: x / Protein: x / Nitrite: x   Leuk Esterase: x / RBC: x / WBC x   Sq Epi: x / Non Sq Epi: x / Bacteria: x        MEDICATIONS  (STANDING):  aMIOdarone    Tablet 200 milliGRAM(s) Oral every 8 hours  aMIOdarone    Tablet   Oral   apixaban 5 milliGRAM(s) Oral every 12 hours  buMETAnide 2 milliGRAM(s) Oral two times a day  chlorhexidine 2% Cloths 1 Application(s) Topical daily  dextrose 5%. 1000 milliLiter(s) (100 mL/Hr) IV Continuous <Continuous>  dextrose 5%. 1000 milliLiter(s) (50 mL/Hr) IV Continuous <Continuous>  dextrose 50% Injectable 25 Gram(s) IV Push once  dextrose 50% Injectable 25 milliLiter(s) IV Push every 15 minutes  dextrose 50% Injectable 50 milliLiter(s) IV Push every 15 minutes  glucagon  Injectable 1 milliGRAM(s) IntraMuscular once  glycopyrrolate 9 MICROgram(s)/formoterol 4.8 MICROgram(s) Inhaler 2 Puff(s) Inhalation two times a day  insulin glargine Injectable (LANTUS) 16 Unit(s) SubCutaneous at bedtime  insulin lispro (ADMELOG) corrective regimen sliding scale   SubCutaneous Before meals and at bedtime  insulin lispro Injectable (ADMELOG) 12 Unit(s) SubCutaneous three times a day before meals  metoprolol tartrate 12.5 milliGRAM(s) Oral every 6 hours  pantoprazole    Tablet 40 milliGRAM(s) Oral before breakfast  polyethylene glycol 3350 17 Gram(s) Oral daily  potassium chloride    Tablet ER 20 milliEquivalent(s) Oral daily  senna 2 Tablet(s) Oral at bedtime  sodium chloride 0.9% lock flush 3 milliLiter(s) IV Push every 8 hours    MEDICATIONS  (PRN):  acetaminophen     Tablet .. 650 milliGRAM(s) Oral every 6 hours PRN Mild Pain (1 - 3)  dextrose Oral Gel 15 Gram(s) Oral once PRN Blood Glucose LESS THAN 70 milliGRAM(s)/deciliter        RADIOLOGY & ADDITIONAL TESTS:

## 2024-03-31 ENCOUNTER — TRANSCRIPTION ENCOUNTER (OUTPATIENT)
Age: 74
End: 2024-03-31

## 2024-03-31 VITALS
DIASTOLIC BLOOD PRESSURE: 51 MMHG | SYSTOLIC BLOOD PRESSURE: 107 MMHG | RESPIRATION RATE: 12 BRPM | HEART RATE: 72 BPM | OXYGEN SATURATION: 92 %

## 2024-03-31 LAB
ANION GAP SERPL CALC-SCNC: 10 MMOL/L — SIGNIFICANT CHANGE UP (ref 5–17)
BUN SERPL-MCNC: 23 MG/DL — SIGNIFICANT CHANGE UP (ref 7–23)
CALCIUM SERPL-MCNC: 10.1 MG/DL — SIGNIFICANT CHANGE UP (ref 8.4–10.5)
CHLORIDE SERPL-SCNC: 104 MMOL/L — SIGNIFICANT CHANGE UP (ref 96–108)
CO2 SERPL-SCNC: 31 MMOL/L — SIGNIFICANT CHANGE UP (ref 22–31)
CREAT SERPL-MCNC: 0.91 MG/DL — SIGNIFICANT CHANGE UP (ref 0.5–1.3)
EGFR: 67 ML/MIN/1.73M2 — SIGNIFICANT CHANGE UP
GLUCOSE BLDC GLUCOMTR-MCNC: 173 MG/DL — HIGH (ref 70–99)
GLUCOSE BLDC GLUCOMTR-MCNC: 241 MG/DL — HIGH (ref 70–99)
GLUCOSE SERPL-MCNC: 140 MG/DL — HIGH (ref 70–99)
HCT VFR BLD CALC: 32.1 % — LOW (ref 34.5–45)
HGB BLD-MCNC: 9.4 G/DL — LOW (ref 11.5–15.5)
MAGNESIUM SERPL-MCNC: 2.4 MG/DL — SIGNIFICANT CHANGE UP (ref 1.6–2.6)
MCHC RBC-ENTMCNC: 26 PG — LOW (ref 27–34)
MCHC RBC-ENTMCNC: 29.3 GM/DL — LOW (ref 32–36)
MCV RBC AUTO: 88.9 FL — SIGNIFICANT CHANGE UP (ref 80–100)
NRBC # BLD: 0 /100 WBCS — SIGNIFICANT CHANGE UP (ref 0–0)
PLATELET # BLD AUTO: 496 K/UL — HIGH (ref 150–400)
POTASSIUM SERPL-MCNC: 4.5 MMOL/L — SIGNIFICANT CHANGE UP (ref 3.5–5.3)
POTASSIUM SERPL-SCNC: 4.5 MMOL/L — SIGNIFICANT CHANGE UP (ref 3.5–5.3)
RBC # BLD: 3.61 M/UL — LOW (ref 3.8–5.2)
RBC # FLD: 16.5 % — HIGH (ref 10.3–14.5)
SODIUM SERPL-SCNC: 145 MMOL/L — SIGNIFICANT CHANGE UP (ref 135–145)
WBC # BLD: 12.68 K/UL — HIGH (ref 3.8–10.5)
WBC # FLD AUTO: 12.68 K/UL — HIGH (ref 3.8–10.5)

## 2024-03-31 PROCEDURE — 85730 THROMBOPLASTIN TIME PARTIAL: CPT

## 2024-03-31 PROCEDURE — 81003 URINALYSIS AUTO W/O SCOPE: CPT

## 2024-03-31 PROCEDURE — C1768: CPT

## 2024-03-31 PROCEDURE — 84681 ASSAY OF C-PEPTIDE: CPT

## 2024-03-31 PROCEDURE — 36430 TRANSFUSION BLD/BLD COMPNT: CPT

## 2024-03-31 PROCEDURE — 97535 SELF CARE MNGMENT TRAINING: CPT

## 2024-03-31 PROCEDURE — 80048 BASIC METABOLIC PNL TOTAL CA: CPT

## 2024-03-31 PROCEDURE — 85610 PROTHROMBIN TIME: CPT

## 2024-03-31 PROCEDURE — 82553 CREATINE MB FRACTION: CPT

## 2024-03-31 PROCEDURE — 86850 RBC ANTIBODY SCREEN: CPT

## 2024-03-31 PROCEDURE — 93005 ELECTROCARDIOGRAM TRACING: CPT

## 2024-03-31 PROCEDURE — 94640 AIRWAY INHALATION TREATMENT: CPT

## 2024-03-31 PROCEDURE — 86891 AUTOLOGOUS BLOOD OP SALVAGE: CPT

## 2024-03-31 PROCEDURE — 84100 ASSAY OF PHOSPHORUS: CPT

## 2024-03-31 PROCEDURE — C1751: CPT

## 2024-03-31 PROCEDURE — 82550 ASSAY OF CK (CPK): CPT

## 2024-03-31 PROCEDURE — C1781: CPT

## 2024-03-31 PROCEDURE — C1887: CPT

## 2024-03-31 PROCEDURE — 82962 GLUCOSE BLOOD TEST: CPT

## 2024-03-31 PROCEDURE — 80053 COMPREHEN METABOLIC PANEL: CPT

## 2024-03-31 PROCEDURE — 86923 COMPATIBILITY TEST ELECTRIC: CPT

## 2024-03-31 PROCEDURE — 80162 ASSAY OF DIGOXIN TOTAL: CPT

## 2024-03-31 PROCEDURE — 97165 OT EVAL LOW COMPLEX 30 MIN: CPT

## 2024-03-31 PROCEDURE — 82947 ASSAY GLUCOSE BLOOD QUANT: CPT

## 2024-03-31 PROCEDURE — 84132 ASSAY OF SERUM POTASSIUM: CPT

## 2024-03-31 PROCEDURE — C1894: CPT

## 2024-03-31 PROCEDURE — 83735 ASSAY OF MAGNESIUM: CPT

## 2024-03-31 PROCEDURE — 93880 EXTRACRANIAL BILAT STUDY: CPT

## 2024-03-31 PROCEDURE — 88305 TISSUE EXAM BY PATHOLOGIST: CPT

## 2024-03-31 PROCEDURE — 86900 BLOOD TYPING SEROLOGIC ABO: CPT

## 2024-03-31 PROCEDURE — 94150 VITAL CAPACITY TEST: CPT

## 2024-03-31 PROCEDURE — 85027 COMPLETE CBC AUTOMATED: CPT

## 2024-03-31 PROCEDURE — 97116 GAIT TRAINING THERAPY: CPT

## 2024-03-31 PROCEDURE — 99232 SBSQ HOSP IP/OBS MODERATE 35: CPT

## 2024-03-31 PROCEDURE — 71046 X-RAY EXAM CHEST 2 VIEWS: CPT

## 2024-03-31 PROCEDURE — 86965 POOLING BLOOD PLATELETS: CPT

## 2024-03-31 PROCEDURE — 86901 BLOOD TYPING SEROLOGIC RH(D): CPT

## 2024-03-31 PROCEDURE — 94002 VENT MGMT INPAT INIT DAY: CPT

## 2024-03-31 PROCEDURE — P9047: CPT

## 2024-03-31 PROCEDURE — 85014 HEMATOCRIT: CPT

## 2024-03-31 PROCEDURE — 97530 THERAPEUTIC ACTIVITIES: CPT

## 2024-03-31 PROCEDURE — P9012: CPT

## 2024-03-31 PROCEDURE — 80061 LIPID PANEL: CPT

## 2024-03-31 PROCEDURE — 81001 URINALYSIS AUTO W/SCOPE: CPT

## 2024-03-31 PROCEDURE — P9037: CPT

## 2024-03-31 PROCEDURE — 82803 BLOOD GASES ANY COMBINATION: CPT

## 2024-03-31 PROCEDURE — 84443 ASSAY THYROID STIM HORMONE: CPT

## 2024-03-31 PROCEDURE — 86803 HEPATITIS C AB TEST: CPT

## 2024-03-31 PROCEDURE — 85025 COMPLETE CBC W/AUTO DIFF WBC: CPT

## 2024-03-31 PROCEDURE — C1889: CPT

## 2024-03-31 PROCEDURE — P9045: CPT

## 2024-03-31 PROCEDURE — 93306 TTE W/DOPPLER COMPLETE: CPT

## 2024-03-31 PROCEDURE — 84295 ASSAY OF SERUM SODIUM: CPT

## 2024-03-31 PROCEDURE — C8929: CPT

## 2024-03-31 PROCEDURE — 87070 CULTURE OTHR SPECIMN AEROBIC: CPT

## 2024-03-31 PROCEDURE — P9016: CPT

## 2024-03-31 PROCEDURE — 97161 PT EVAL LOW COMPLEX 20 MIN: CPT

## 2024-03-31 PROCEDURE — P9059: CPT

## 2024-03-31 PROCEDURE — 36415 COLL VENOUS BLD VENIPUNCTURE: CPT

## 2024-03-31 PROCEDURE — 83880 ASSAY OF NATRIURETIC PEPTIDE: CPT

## 2024-03-31 PROCEDURE — 83605 ASSAY OF LACTIC ACID: CPT

## 2024-03-31 PROCEDURE — 83036 HEMOGLOBIN GLYCOSYLATED A1C: CPT

## 2024-03-31 PROCEDURE — 71045 X-RAY EXAM CHEST 1 VIEW: CPT

## 2024-03-31 PROCEDURE — P9100: CPT

## 2024-03-31 PROCEDURE — 82330 ASSAY OF CALCIUM: CPT

## 2024-03-31 PROCEDURE — 84484 ASSAY OF TROPONIN QUANT: CPT

## 2024-03-31 PROCEDURE — C1769: CPT

## 2024-03-31 RX ORDER — PANTOPRAZOLE SODIUM 20 MG/1
1 TABLET, DELAYED RELEASE ORAL
Qty: 30 | Refills: 0
Start: 2024-03-31 | End: 2024-04-29

## 2024-03-31 RX ORDER — METOPROLOL TARTRATE 50 MG
1 TABLET ORAL
Qty: 60 | Refills: 0
Start: 2024-03-31 | End: 2024-04-29

## 2024-03-31 RX ORDER — SITAGLIPTIN 50 MG/1
1 TABLET, FILM COATED ORAL
Qty: 30 | Refills: 0
Start: 2024-03-31 | End: 2024-04-29

## 2024-03-31 RX ORDER — APIXABAN 2.5 MG/1
1 TABLET, FILM COATED ORAL
Qty: 60 | Refills: 0
Start: 2024-03-31 | End: 2024-04-29

## 2024-03-31 RX ORDER — BUMETANIDE 0.25 MG/ML
2 INJECTION INTRAMUSCULAR; INTRAVENOUS DAILY
Refills: 0 | Status: DISCONTINUED | OUTPATIENT
Start: 2024-03-31 | End: 2024-03-31

## 2024-03-31 RX ORDER — POTASSIUM CHLORIDE 20 MEQ
1 PACKET (EA) ORAL
Qty: 7 | Refills: 0
Start: 2024-03-31 | End: 2024-04-06

## 2024-03-31 RX ORDER — INSULIN GLARGINE 100 [IU]/ML
15 INJECTION, SOLUTION SUBCUTANEOUS
Qty: 1 | Refills: 0
Start: 2024-03-31 | End: 2024-04-29

## 2024-03-31 RX ORDER — ACETAMINOPHEN 500 MG
2 TABLET ORAL
Qty: 180 | Refills: 0
Start: 2024-03-31 | End: 2024-04-29

## 2024-03-31 RX ORDER — POLYETHYLENE GLYCOL 3350 17 G/17G
17 POWDER, FOR SOLUTION ORAL
Qty: 510 | Refills: 0
Start: 2024-03-31 | End: 2024-04-29

## 2024-03-31 RX ORDER — AMIODARONE HYDROCHLORIDE 400 MG/1
1 TABLET ORAL
Qty: 30 | Refills: 0
Start: 2024-03-31 | End: 2024-04-29

## 2024-03-31 RX ORDER — SITAGLIPTIN AND METFORMIN HYDROCHLORIDE 500; 50 MG/1; MG/1
1 TABLET, FILM COATED ORAL
Refills: 0 | DISCHARGE

## 2024-03-31 RX ORDER — BUMETANIDE 0.25 MG/ML
1 INJECTION INTRAMUSCULAR; INTRAVENOUS
Qty: 7 | Refills: 0
Start: 2024-03-31 | End: 2024-04-06

## 2024-03-31 RX ORDER — ISOPROPYL ALCOHOL, BENZOCAINE .7; .06 ML/ML; ML/ML
0 SWAB TOPICAL
Qty: 100 | Refills: 1
Start: 2024-03-31

## 2024-03-31 RX ORDER — METFORMIN HYDROCHLORIDE 850 MG/1
1 TABLET ORAL
Qty: 60 | Refills: 0
Start: 2024-03-31 | End: 2024-04-29

## 2024-03-31 RX ORDER — ATORVASTATIN CALCIUM 80 MG/1
1 TABLET, FILM COATED ORAL
Refills: 0 | DISCHARGE

## 2024-03-31 RX ORDER — AMLODIPINE BESYLATE AND BENAZEPRIL HYDROCHLORIDE 10; 20 MG/1; MG/1
1 CAPSULE ORAL
Refills: 0 | DISCHARGE

## 2024-03-31 RX ORDER — ASPIRIN/CALCIUM CARB/MAGNESIUM 324 MG
0 TABLET ORAL
Refills: 0 | DISCHARGE

## 2024-03-31 RX ADMIN — Medication 12 UNIT(S): at 07:25

## 2024-03-31 RX ADMIN — Medication 12 UNIT(S): at 11:12

## 2024-03-31 RX ADMIN — Medication 2: at 06:55

## 2024-03-31 RX ADMIN — APIXABAN 5 MILLIGRAM(S): 2.5 TABLET, FILM COATED ORAL at 06:22

## 2024-03-31 RX ADMIN — CHLORHEXIDINE GLUCONATE 1 APPLICATION(S): 213 SOLUTION TOPICAL at 06:23

## 2024-03-31 RX ADMIN — BUMETANIDE 2 MILLIGRAM(S): 0.25 INJECTION INTRAMUSCULAR; INTRAVENOUS at 06:23

## 2024-03-31 RX ADMIN — AMIODARONE HYDROCHLORIDE 200 MILLIGRAM(S): 400 TABLET ORAL at 06:22

## 2024-03-31 RX ADMIN — Medication 12.5 MILLIGRAM(S): at 11:06

## 2024-03-31 RX ADMIN — Medication 12.5 MILLIGRAM(S): at 01:00

## 2024-03-31 RX ADMIN — Medication 4: at 11:11

## 2024-03-31 RX ADMIN — GLYCOPYRROLATE AND FORMOTEROL FUMARATE 2 PUFF(S): 9; 4.8 AEROSOL, METERED RESPIRATORY (INHALATION) at 09:56

## 2024-03-31 RX ADMIN — Medication 20 MILLIEQUIVALENT(S): at 11:06

## 2024-03-31 RX ADMIN — PANTOPRAZOLE SODIUM 40 MILLIGRAM(S): 20 TABLET, DELAYED RELEASE ORAL at 06:22

## 2024-03-31 RX ADMIN — SODIUM CHLORIDE 3 MILLILITER(S): 9 INJECTION INTRAMUSCULAR; INTRAVENOUS; SUBCUTANEOUS at 05:13

## 2024-03-31 RX ADMIN — Medication 12.5 MILLIGRAM(S): at 06:22

## 2024-03-31 NOTE — PROGRESS NOTE ADULT - SUBJECTIVE AND OBJECTIVE BOX
Received a call from the primary team that patient is being discharged today. She is awake and doing well, tight control with value of 77. She is eating fine without issues.        MEDICATIONS  (STANDING):  aMIOdarone    Tablet 200 milliGRAM(s) Oral every 8 hours  aMIOdarone    Tablet   Oral   apixaban 5 milliGRAM(s) Oral every 12 hours  buMETAnide 2 milliGRAM(s) Oral daily  chlorhexidine 2% Cloths 1 Application(s) Topical daily  dextrose 5%. 1000 milliLiter(s) (100 mL/Hr) IV Continuous <Continuous>  dextrose 5%. 1000 milliLiter(s) (50 mL/Hr) IV Continuous <Continuous>  dextrose 50% Injectable 25 Gram(s) IV Push once  dextrose 50% Injectable 50 milliLiter(s) IV Push every 15 minutes  dextrose 50% Injectable 25 milliLiter(s) IV Push every 15 minutes  glucagon  Injectable 1 milliGRAM(s) IntraMuscular once  glycopyrrolate 9 MICROgram(s)/formoterol 4.8 MICROgram(s) Inhaler 2 Puff(s) Inhalation two times a day  insulin glargine Injectable (LANTUS) 16 Unit(s) SubCutaneous at bedtime  insulin lispro (ADMELOG) corrective regimen sliding scale   SubCutaneous Before meals and at bedtime  insulin lispro Injectable (ADMELOG) 12 Unit(s) SubCutaneous three times a day before meals  metoprolol tartrate 12.5 milliGRAM(s) Oral every 6 hours  pantoprazole    Tablet 40 milliGRAM(s) Oral before breakfast  polyethylene glycol 3350 17 Gram(s) Oral daily  potassium chloride    Tablet ER 20 milliEquivalent(s) Oral daily  senna 2 Tablet(s) Oral at bedtime  sodium chloride 0.9% lock flush 3 milliLiter(s) IV Push every 8 hours    MEDICATIONS  (PRN):  acetaminophen     Tablet .. 650 milliGRAM(s) Oral every 6 hours PRN Mild Pain (1 - 3)  dextrose Oral Gel 15 Gram(s) Oral once PRN Blood Glucose LESS THAN 70 milliGRAM(s)/deciliter      Allergies  No Known Allergies    Review of Systems:  Constitutional: No fever  Eyes: No blurry vision  Cardiovascular: No chest pain, palpitations  Respiratory: No SOB, no cough  ALL OTHER SYSTEMS REVIEWED AND NEGATIVE      PHYSICAL EXAM:  VITALS: T(C): 36.2 (03-31-24 @ 08:58)  T(F): 97.2 (03-31-24 @ 08:58), Max: 97.8 (03-30-24 @ 13:54)  HR: 70 (03-31-24 @ 09:11) (66 - 114)  BP: 92/46 (03-31-24 @ 09:11) (85/42 - 126/51)  RR:  (12 - 18)  SpO2:  (90% - 95%)  Wt(kg): --  GENERAL: NAD, well-groomed, well-developed  EYES: No proptosis, no lid lag, anicteric  HEENT:  Atraumatic, Normocephalic, moist mucous membranes  RESPIRATORY: Respirations are even and unlabored  CARDIOVASCULAR: Regular rate   GI: Soft, nontender, non distended, normal bowel sounds  PSYCH: Alert and oriented x 3, reactive affect, normal mood    POCT Blood Glucose.: 173 mg/dL (03-31-24 @ 06:28)  POCT Blood Glucose.: 133 mg/dL (03-30-24 @ 22:12)  POCT Blood Glucose.: 77 mg/dL (03-30-24 @ 21:23)  POCT Blood Glucose.: 123 mg/dL (03-30-24 @ 16:52)  POCT Blood Glucose.: 216 mg/dL (03-30-24 @ 11:25)  POCT Blood Glucose.: 253 mg/dL (03-30-24 @ 07:07)  POCT Blood Glucose.: 331 mg/dL (03-30-24 @ 00:43)  POCT Blood Glucose.: 411 mg/dL (03-29-24 @ 22:59)  POCT Blood Glucose.: 420 mg/dL (03-29-24 @ 22:53)  POCT Blood Glucose.: 360 mg/dL (03-29-24 @ 21:45)  POCT Blood Glucose.: 195 mg/dL (03-29-24 @ 16:54)  POCT Blood Glucose.: 235 mg/dL (03-29-24 @ 11:24)  POCT Blood Glucose.: 179 mg/dL (03-29-24 @ 07:00)  POCT Blood Glucose.: 201 mg/dL (03-28-24 @ 22:13)  POCT Blood Glucose.: 190 mg/dL (03-28-24 @ 16:10)  POCT Blood Glucose.: 294 mg/dL (03-28-24 @ 11:14)      03-31    145  |  104  |  23  ----------------------------<  140<H>  4.5   |  31  |  0.91    eGFR: 67    Ca    10.1      03-31  Mg     2.4     03-31  Phos  3.9     03-29    TPro  6.0  /  Alb  3.2<L>  /  TBili  0.4  /  DBili  x   /  AST  29  /  ALT  44  /  AlkPhos  93  03-29    Thyroid Function Tests:  03-12 @ 22:12 TSH 1.530

## 2024-03-31 NOTE — DISCHARGE NOTE PROVIDER - NSDCFUADDAPPT_GEN_ALL_CORE_FT
The office will call you with appointment times. If you do not hear from them by Tuesday, please call and confirm appointment times.

## 2024-03-31 NOTE — PROGRESS NOTE ADULT - ASSESSMENT
Patient is a 74 yo woman with uncontrolled T2DM with an A1c of 9.6% severe AS, EF 60%, HTN, HLD, anemia who was admitted to [facility] with syncope. CT negative for CVA, but echo showed severe AS s/p SAVR 3/19/2024.

## 2024-03-31 NOTE — DISCHARGE NOTE PROVIDER - NSDCMRMEDTOKEN_GEN_ALL_CORE_FT
amlodipine-benazepril 5 mg-10 mg oral capsule: 1 cap(s) orally once a day  glipiZIDE 10 mg oral tablet, extended release: 1 tab(s) orally once a day  Janumet 50 mg-1000 mg oral tablet: 1 tab(s) orally 2 times a day  Lipitor 40 mg oral tablet: 1 tab(s) orally once a day  Ozempic 2 mg/3 mL (0.25 mg or 0.5 mg dose) subcutaneous solution: 0.25 milligram(s) subcutaneously once a week Inject 0.25mg weekly for four weeks, and then increase to 0.5mg weekly if tolerated.  timolol hemihydrate 0.5% ophthalmic solution: 1 drop(s) in each affected eye 2 times a day  Travatan 0.004% ophthalmic solution: 1 drop(s) in each affected eye once a day   acetaminophen 325 mg oral tablet: 2 tab(s) orally every 8 hours as needed for Mild Pain (1 - 3)  alcohol swabs: Apply topically to affected area 4 times a day  amiodarone 200 mg oral tablet: 1 tab(s) orally once a day  apixaban 5 mg oral tablet: 1 tab(s) orally every 12 hours  bumetanide 2 mg oral tablet: 1 tab(s) orally once a day MDD: 1 tab a day  glucometer (per patient&#x27;s insurance): Test blood sugars four times a day. Dispense #1 glucometer.  Januvia 100 mg oral tablet: 1 tab(s) orally once a day  lancets: 1 application subcutaneously 4 times a day  Lantus Solostar Pen 100 units/mL subcutaneous solution: 15 unit(s) subcutaneous once a day (at bedtime) 15 unit(s) subcutaneous once a day at bedtime  metFORMIN 1000 mg oral tablet: 1 tab(s) orally every 12 hours  Metoprolol Tartrate 25 mg oral tablet: 1 tab(s) orally every 12 hours  pantoprazole 40 mg oral delayed release tablet: 1 tab(s) orally once a day (before a meal)  polyethylene glycol 3350 oral powder for reconstitution: 17 gram(s) orally once a day  potassium chloride 20 mEq oral tablet, extended release: 1 tab(s) orally once a day take with bumex MDD: 1 a day  test strips (per patient&#x27;s insurance): 1 application subcutaneously 4 times a day. ** Compatible with patient&#x27;s glucometer **  timolol hemihydrate 0.5% ophthalmic solution: 1 drop(s) in each affected eye 2 times a day  Travatan 0.004% ophthalmic solution: 1 drop(s) in each affected eye once a day

## 2024-03-31 NOTE — DISCHARGE NOTE PROVIDER - PROVIDER TOKENS
PROVIDER:[TOKEN:[[id number]],FOLLOWUP:[1 week]],FREE:[LAST:[Dr. Moon],PHONE:[phone]],FAX:[(   )    -],ADDRESS:[[address] [place] [address], [id number]],FOLLOWUP:[2 weeks]]

## 2024-03-31 NOTE — DISCHARGE NOTE PROVIDER - HOSPITAL COURSE
Patient discussed on morning rounds with Dr. Gil    Operation Date: Aortic root replacement (21mm Konect) 3/19/24    Primary Surgeon/Attending MD: Dr. Rhoades    Referring Physician: Dr. Moon   _ _ _ _ _ _ _ _ _ _ _ _  HOSPITAL COURSE: 73 yr female with a PMHx DM, severe AS, EF 60%, HTN, HLD, anemia who was admitted to [facility] with syncope. CT negative for CVA. subsequent echo showed severe AS. Transferred to [facility] under Dr. Moon for evaluation for TAVR vs BAV. After TAVR evaluation it was determined that the patients root was too small for a TAVR and the patient was then referred to Dr. Rhoades for an Aortic root replacement. On 3/19 pt underwent an aortic root replacement (21mm Konect) EF 60%. Post op she recovered in the ICU and was extubated, swan removed POD 1 and patient ambulated. She became hypotensive and bradycardic while on BB and Amio requiring pacing via PW. BB stopped amio continued. POD 2 hard tubes removed and primacor remained. POD 3 Primacor discontinued, patient was delined and TOV passed. POD 4 patient found to have Tracheobroncitis and was started on Ceftriaxone and Azithro and was bronched. Primacor was restarted and  was started. POD 5 weaning  and primacor. Patient was In and out of Afib with poor rate control to the 1teens-130s. Effusions noted on POCUS and b/l pigtails placed. POD 6 pigtails removed. POD 7 patient was tranferred to [facility] for SDU care, on bumex PO BID and not on BB due to blood pressure. Per Dr. Rhoades no huseyin agents at this time due to hx of CHB. POD8-9 patient continued to be in and out of rapid afib, amio load continued (tolerating well, no CHB episodes). POD10 PW removed, Eliquis started in the PM. POD11: EP consulted for patient and low dose BB started. Patient converted to sinus. PA/LAT with small effusions, patient is ambulating and satting well off of oxygen. POD12: patient remains in sinus this morning. After discussion with Dr. Gil this morning on rounds, the patient is stable and ready for discharge home with MCOT and eliquis.   _ _ _ _ _ _ _ _ _ _ _ _  DISCHARGE PHYSICAL EXAM:  General: NAD, sitting up in bed comfortably  Neurological: AOx3. Motor skills grossly intact  Cardiovascular: Normal S1/S2. in NSR. No murmurs  Respiratory: Lungs CTA bilaterally. No wheezing or rales.   Gastrointestinal: +BS in all 4 quadrants. Non-distended. Soft. Non-tender  Extremities: Strength 5/5 b/l upper/lower extremities. Sensation grossly intact upper/lower extremities. No edema. No calf tenderness.  Vascular: Radial 2+bilaterally, DP 2+ b/l  Incision Sites: MSI clean dry and intact, no sternal click noted. Chest tube incision sites are clean, dry and intact.      _ _ _ _ _ _ _ _  REMOVAL CHECKLIST:        [ x] Epicardial wires          [ x] Stitches/tie downs,   If no, why? ______          [ ] PICC/Midline,   If no, why? ________  _ _ _ _ _ _ _ _ _ _ _ _   MEDICATION DISCHARGE CHECKLIST     Surgical Valve        [ ] Aspirin, [  ] Contraindicated, Reason not indicated        [x ] Bumex, [  ] Contraindicated, Reason             Duration: _____        [ x] Beta-Blocker, [  ] Contraindicated, Reason_______________________________        Anticoagulation        [ x] NOAC, [ ] Reason afib              Cost/Insurance barriers addressed: YES/NO     _ _ _ _ _ _ _ _ _ _ _ _  RELEVANT LABS/IMAGING:    Over 35 minutes was spent with the patient reviewing the discharge material including medications, follow up appointments, recovery, concerning symptoms, and how to contact their health care providers if they have questions   Patient discussed on morning rounds with Dr. Gil    Operation Date: Aortic root replacement (21mm Konect) 3/19/24    Primary Surgeon/Attending MD: Dr. Rhoades    Referring Physician: Dr. Moon   _ _ _ _ _ _ _ _ _ _ _ _  HOSPITAL COURSE: 73 yr female with a PMHx DM, severe AS, EF 60%, HTN, HLD, anemia who was admitted to [facility] with syncope. CT negative for CVA. subsequent echo showed severe AS. Transferred to [facility] under Dr. Moon for evaluation for TAVR vs BAV. After TAVR evaluation it was determined that the patients root was too small for a TAVR and the patient was then referred to Dr. Rhoades for an Aortic root replacement. On 3/19 pt underwent an aortic root replacement (21mm Konect) EF 60%. Post op she recovered in the ICU and was extubated, swan removed POD 1 and patient ambulated. She became hypotensive and bradycardic while on BB and Amio requiring pacing via PW. BB stopped amio continued. POD 2 hard tubes removed and primacor remained. POD 3 Primacor discontinued, patient was delined and TOV passed. POD 4 patient found to have Tracheobroncitis and was started on Ceftriaxone and Azithro and was bronched. Primacor was restarted and  was started. POD 5 weaning  and primacor. Patient was In and out of Afib with poor rate control to the 1teens-130s. Effusions noted on POCUS and b/l pigtails placed. POD 6 pigtails removed. POD 7 patient was tranferred to [facility] for SDU care, on bumex PO BID and not on BB due to blood pressure. Per Dr. Rhoades no huseyin agents at this time due to hx of CHB. POD8-9 patient continued to be in and out of rapid afib, amio load continued (tolerating well, no CHB episodes). POD10 PW removed, Eliquis started in the PM. POD11: EP consulted for patient and low dose BB started. Patient converted to sinus. PA/LAT with small effusions, patient is ambulating and satting well off of oxygen. POD12: patient remains in sinus this morning. Bedside echo was preformed this morning which revealed no effusion. Retention sutures to remain until follow up visit as per Dr. Rhoades. After discussion with Dr. Gil this morning on rounds, the patient is stable and ready for discharge home with CARA and eliquis.   _ _ _ _ _ _ _ _ _ _ _ _  DISCHARGE PHYSICAL EXAM:  General: NAD, sitting up in bed comfortably  Neurological: AOx3. Motor skills grossly intact  Cardiovascular: Normal S1/S2. in NSR. No murmurs  Respiratory: Lungs CTA bilaterally. No wheezing or rales.   Gastrointestinal: +BS in all 4 quadrants. Non-distended. Soft. Non-tender  Extremities: Strength 5/5 b/l upper/lower extremities. Sensation grossly intact upper/lower extremities. No edema. No calf tenderness.  Vascular: Radial 2+bilaterally, DP 2+ b/l  Incision Sites: MSI clean dry and intact, no sternal click noted. Chest tube incision sites are clean, dry and intact.      _ _ _ _ _ _ _ _  REMOVAL CHECKLIST:        [ x] Epicardial wires          [ x] Stitches/tie downs,   If no, why? ______          [ ] PICC/Midline,   If no, why? ________  _ _ _ _ _ _ _ _ _ _ _ _   MEDICATION DISCHARGE CHECKLIST     Surgical Valve        [ ] Aspirin, [  ] Contraindicated, Reason not indicated        [x ] Bumex, [  ] Contraindicated, Reason             Duration: _____        [ x] Beta-Blocker, [  ] Contraindicated, Reason_______________________________        Anticoagulation        [ x] NOAC, [ ] Reason afib              Cost/Insurance barriers addressed: YES/NO     _ _ _ _ _ _ _ _ _ _ _ _  RELEVANT LABS/IMAGING:    Over 35 minutes was spent with the patient reviewing the discharge material including medications, follow up appointments, recovery, concerning symptoms, and how to contact their health care providers if they have questions

## 2024-03-31 NOTE — PROGRESS NOTE ADULT - PROBLEM SELECTOR PLAN 1
-the patient's glucose levels are controlled, she states she used to take insulin in the past but it "didn't work for me." She is amenable to restarting. Based on her age, risk for hypoglycemia may be high. Patient used to have endocrinologist at [facility]  but her care transferred to [facility]  -for now, discharge recommendations are STOP glipizide to avoid hypoglycemia. She can do Lantus 15 units once a day, metformin 1000 mg BID and januvia 100 daily  -she was educated about hypoglycemia risk  -continue to check sugars  -follow up with [facility]/[place] [facility] if unable to get timely care at [facility]  -she is on amiodarone, recommend surveillance TFTs  Plan discussed with patient and primary team

## 2024-03-31 NOTE — PROGRESS NOTE ADULT - PROVIDER SPECIALTY LIST ADULT
CT Surgery
Critical Care
CT Surgery
CT Surgery
Cardiology
Critical Care
Endocrinology
Endocrinology
Structural Heart
CT Surgery
Critical Care
Endocrinology
CT Surgery
Endocrinology

## 2024-03-31 NOTE — DISCHARGE NOTE NURSING/CASE MANAGEMENT/SOCIAL WORK - PATIENT PORTAL LINK FT
You can access the FollowMyHealth Patient Portal offered by [facility] by registering at the following website: [email or web address]. By joining [email or web address]’s FollowMyHealth portal, you will also be able to view your health information using other applications (apps) compatible with our system.

## 2024-03-31 NOTE — DISCHARGE NOTE PROVIDER - NSDCFUADDINST_GEN_ALL_CORE_FT
-Walk daily as tolerated and use your incentive spirometer 10 times every hour while you are awake.     -Please weigh yourself daily. If you notice over a 3 pound weight gain in 3 days, this is a sign you are likely retaining too much fluid. It is imperative you call our right away with unexplained rapid weight gain.      -Please continue to wear the compression stockings given to you in the hospital at home. This is a way to prevent fluid from building up in your legs.     -No driving or strenuous activity/exercise until cleared by your surgeon.    -Gently clean your incisions with unscented/antibacterial soap and water, pat dry.  You may leave them open to air.    -Call your doctor if you have shortness of breath, chest pain not relieved by pain medication, dizziness, fever >100.5, or increased redness or drainage from incisions.    - Please be sure to follow all MCOT instructions.  -Walk daily as tolerated and use your incentive spirometer 10 times every hour while you are awake.     -Please weigh yourself daily. If you notice over a 3 pound weight gain in 3 days, this is a sign you are likely retaining too much fluid. It is imperative you call our right away with unexplained rapid weight gain.      -Please continue to wear the compression stockings given to you in the hospital at home. This is a way to prevent fluid from building up in your legs.     -No driving or strenuous activity/exercise until cleared by your surgeon.    -Gently clean your incisions with unscented/antibacterial soap and water, pat dry.  You may leave them open to air.    -Call your doctor if you have shortness of breath, chest pain not relieved by pain medication, dizziness, fever >100.5, or increased redness or drainage from incisions.    - Please be sure to follow all MCOT instructions.   You had a MCOT monitor (an external cardiac rhythm monitoring device) placed on your day of discharge.  This helps us monitor your heart while you are out of the hospital for 30 days after discharge. Should your heart go into an abnormal or dangerous rhythm you will receieve a call from the MCOT team and your Structural Heart team of Doctors and PA's will be notified.    1. Keep the monitor within 30 feet of you at all times.  2. When you feel any symptom (chest pain, dizziness, palpitations, weakness, fatigue or anything outside of your normal), press the “Record Symptoms” button on the main phone of your phone  3. Shower or exercise as normal whilewearing the MCOT Patch. Do not swim or take a bath. Patch is water-resistant, not waterproof  4. When the battery is low on the phone or on the device, use the supplied . The monitor will show a warning message when the battery is low.  5. Do not remove the patch from yourskin after you begin monitoring. With normal wear, each patch should last 5 days. To replace the patch follow instructions in the MCOT box with the Patch Guide  6. Any issues with the MCOT device or phone please call [employer] [facility] at [phone].  7. If you have any other questions at all please call the Structural Heart office at [phone]

## 2024-03-31 NOTE — PROGRESS NOTE ADULT - REASON FOR ADMISSION
severe AS

## 2024-03-31 NOTE — DISCHARGE NOTE PROVIDER - CARE PROVIDER_API CALL
Omar Rhoades  Thoracic and Cardiac Surgery  [address] [address]  Phone: [phone]  Fax: [phone]  Follow Up Time: 1 week    Dr. Moon,   [address] [place] [address] cardiology  [facility], [address]  Phone: [phone]  Fax: (   )    -  Follow Up Time: 2 weeks

## 2024-03-31 NOTE — PROGRESS NOTE ADULT - TIME BILLING
Management of diabetes, reconciliation of medications, glycemic goals, hypoglycemia education.  Primary team coordination of care
Review of glycemic control, adjustments to insulin, consideration of discharge recommendations and coordination with primary care team
Review of glucose levels, medication reconciliation; symptom evaluation; insulin titration, hypoglycemia education. Communication with primary team
Review of medication reconciliation, patient evaluation, adjustments of basal/bolus insulin needs. Care team communication

## 2024-03-31 NOTE — DISCHARGE NOTE PROVIDER - NSDCCPCAREPLAN_GEN_ALL_CORE_FT
PRINCIPAL DISCHARGE DIAGNOSIS  Diagnosis: Aortic stenosis, severe  Assessment and Plan of Treatment:

## 2024-04-01 ENCOUNTER — NON-APPOINTMENT (OUTPATIENT)
Age: 74
End: 2024-04-01

## 2024-04-01 ENCOUNTER — APPOINTMENT (OUTPATIENT)
Dept: CARE COORDINATION | Facility: HOME HEALTH | Age: 74
End: 2024-04-01
Payer: MEDICARE

## 2024-04-01 DIAGNOSIS — Z98.890 OTHER SPECIFIED POSTPROCEDURAL STATES: ICD-10-CM

## 2024-04-01 PROCEDURE — 99024 POSTOP FOLLOW-UP VISIT: CPT

## 2024-04-01 RX ORDER — TIMOLOL MALEATE 5 MG/ML
0.5 SOLUTION OPHTHALMIC TWICE DAILY
Refills: 0 | Status: ACTIVE | COMMUNITY

## 2024-04-01 RX ORDER — SITAGLIPTIN AND METFORMIN HYDROCHLORIDE 50; 1000 MG/1; MG/1
50-1000 TABLET, FILM COATED ORAL
Refills: 0 | Status: DISCONTINUED | COMMUNITY
End: 2024-04-01

## 2024-04-01 RX ORDER — GLIPIZIDE 10 MG/1
10 TABLET ORAL
Refills: 0 | Status: DISCONTINUED | COMMUNITY
End: 2024-04-01

## 2024-04-01 RX ORDER — TRAVOPROST (BENZALKONIUM) 0.004 %
0 DROPS OPHTHALMIC (EYE)
Refills: 0 | Status: ACTIVE | COMMUNITY

## 2024-04-01 RX ORDER — METFORMIN HYDROCHLORIDE 1000 MG/1
1000 TABLET, COATED ORAL TWICE DAILY
Qty: 180 | Refills: 0 | Status: ACTIVE | COMMUNITY

## 2024-04-01 RX ORDER — SITAGLIPTIN 100 MG/1
100 TABLET, FILM COATED ORAL DAILY
Qty: 90 | Refills: 0 | Status: ACTIVE | COMMUNITY

## 2024-04-01 NOTE — HISTORY OF PRESENT ILLNESS
[FreeTextEntry1] : [facility]: [facility]  24-hour post discharge follow-up and assessment   Operation Date: Aortic root replacement (21mm Konect) 3/19/24: Primary Surgeon/Attending MD: Dr. Rhodaes    Africa Woodard is a 73 yr female with a PMHx DM, severe AS, EF 60%, HTN, HLD, anemia who was admitted to [facility] with syncope. CT negative for CVA. subsequent echo showed severe AS. Transferred to [facility] under Dr. Moon for evaluation for TAVR vs BAV. After TAVR evaluation it was determined that the patients root was too small for a TAVR and the patient was then referred to Dr. Rhoades for an Aortic root replacement.    On 3/19 pt underwent an aortic root replacement (21mm Konect) EF 60%. Post op she recovered in the ICU and was extubated, swan removed POD 1 and patient ambulated. She was discharged to home with SERGIO and eliquis on 3/31/2024. Visit by [facility] ACP for post discharge follow-up, initiated as a THV, patient unable to connect via video, conducted as telephonic. She reports she is "feeling well" with no complaints at this time, endorses being active daily. Follow-up SOC with [facility] RN scheduled for 4/2/2024.

## 2024-04-01 NOTE — ASSESSMENT
[FreeTextEntry1] : Mr Woodard reports she is recovering well at home s/p Aortic Root Replacement; she is accompanied by partner Abel. Reviewed all medications and dosages with patient understanding. Patient has all medications in home and is taking as prescribed. Pain controlled with current medication regimen. No new symptoms, issues or concerns. Reports ambulating around home independently, without the use if assistive device. Denies chest pain, SOB/SPIVEY, nausea/vomiting, constipation/diarrhea. ; she does not have a scale in home.   PLAN OF CARE  -encouraged IS use 10x q1h to improve circulation and breathing.  -Shower let water run over incision use antibacterial soap and pat dry; avoid all creams, ointments or lotions leave open to air.   -Encourage increased ambulation to include outdoors; avoiding extreme temperatures.  -Start daily weights today; call immediately for weight gain >3lbs in a day/5lbs in a week.   Follow Up:  Omar Rhoades  Thoracic and Cardiac Surgery  [address] [address]  Phone: [phone]  Fax: [phone]  Follow Up Time: 1 week     Dr. Moon,  [address] [place] [address]  3rd floor cardiology  [facility], [address]  Phone: [phone]  Fax: ( ) -  Follow Up Time: 2 weeks  Follow Your Heart team will continue to follow up with patient's status. NP/CCC roles explained with patient understanding, contact information provided. Patient agrees to call with any questions, issues or concerns. Worsening symptoms reviewed with patient with reiteration and understanding.

## 2024-04-01 NOTE — REASON FOR VISIT
[Home] : at home, [unfilled] , at the time of the visit. [Partner] : partner [Other Location: e.g. Home (Enter Location, City,State)___] : at [unfilled] [Patient] : the patient [Self] : self [Follow-Up: _____] : a [unfilled] follow-up visit [This encounter was initiated by telehealth (audio with video) and converted to telephone (audio only) due to technical difficulties.] : This encounter was initiated by telehealth (audio with video) and converted to telephone (audio only) due to technical difficulties.

## 2024-04-02 RX ORDER — BLOOD-GLUCOSE METER
W/DEVICE KIT MISCELLANEOUS
Qty: 1 | Refills: 0 | Status: ACTIVE | COMMUNITY
Start: 2024-04-02 | End: 1900-01-01

## 2024-04-02 RX ORDER — ISOPROPYL ALCOHOL 70 ML/100ML
SWAB TOPICAL
Qty: 1 | Refills: 0 | Status: ACTIVE | COMMUNITY
Start: 2024-04-02 | End: 1900-01-01

## 2024-04-04 ENCOUNTER — NON-APPOINTMENT (OUTPATIENT)
Age: 74
End: 2024-04-04

## 2024-04-10 ENCOUNTER — NON-APPOINTMENT (OUTPATIENT)
Age: 74
End: 2024-04-10

## 2024-04-12 ENCOUNTER — NON-APPOINTMENT (OUTPATIENT)
Age: 74
End: 2024-04-12

## 2024-04-12 ENCOUNTER — APPOINTMENT (OUTPATIENT)
Dept: CARDIOTHORACIC SURGERY | Facility: CLINIC | Age: 74
End: 2024-04-12

## 2024-04-23 ENCOUNTER — RESULT CHARGE (OUTPATIENT)
Age: 74
End: 2024-04-23

## 2024-04-23 ENCOUNTER — APPOINTMENT (OUTPATIENT)
Dept: CARDIOTHORACIC SURGERY | Facility: CLINIC | Age: 74
End: 2024-04-23
Payer: MEDICARE

## 2024-04-23 ENCOUNTER — NON-APPOINTMENT (OUTPATIENT)
Age: 74
End: 2024-04-23

## 2024-04-23 ENCOUNTER — OUTPATIENT (OUTPATIENT)
Dept: OUTPATIENT SERVICES | Facility: HOSPITAL | Age: 74
LOS: 1 days | End: 2024-04-23
Payer: MEDICARE

## 2024-04-23 VITALS
RESPIRATION RATE: 16 BRPM | BODY MASS INDEX: 24.92 KG/M2 | TEMPERATURE: 97 F | OXYGEN SATURATION: 97 % | HEART RATE: 94 BPM | WEIGHT: 132 LBS | SYSTOLIC BLOOD PRESSURE: 121 MMHG | DIASTOLIC BLOOD PRESSURE: 82 MMHG | HEIGHT: 61 IN

## 2024-04-23 DIAGNOSIS — Z95.4 PRESENCE OF OTHER HEART-VALVE REPLACEMENT: ICD-10-CM

## 2024-04-23 DIAGNOSIS — Z95.2 PRESENCE OF OTHER HEART-VALVE REPLACEMENT: ICD-10-CM

## 2024-04-23 DIAGNOSIS — I35.0 NONRHEUMATIC AORTIC (VALVE) STENOSIS: ICD-10-CM

## 2024-04-23 PROCEDURE — 71046 X-RAY EXAM CHEST 2 VIEWS: CPT

## 2024-04-23 PROCEDURE — 99024 POSTOP FOLLOW-UP VISIT: CPT

## 2024-04-23 PROCEDURE — 93000 ELECTROCARDIOGRAM COMPLETE: CPT

## 2024-04-23 PROCEDURE — 71046 X-RAY EXAM CHEST 2 VIEWS: CPT | Mod: 26

## 2024-04-23 RX ORDER — APIXABAN 5 MG/1
5 TABLET, FILM COATED ORAL
Qty: 60 | Refills: 3 | Status: DISCONTINUED | COMMUNITY
End: 2024-04-23

## 2024-04-23 RX ORDER — PANTOPRAZOLE 40 MG/1
40 TABLET, DELAYED RELEASE ORAL DAILY
Qty: 30 | Refills: 0 | Status: DISCONTINUED | COMMUNITY
Start: 2024-04-02 | End: 2024-04-23

## 2024-04-23 RX ORDER — BUMETANIDE 2 MG/1
2 TABLET ORAL DAILY
Refills: 0 | Status: DISCONTINUED | COMMUNITY
End: 2024-04-23

## 2024-04-23 RX ORDER — METOPROLOL TARTRATE 25 MG/1
25 TABLET, FILM COATED ORAL
Qty: 60 | Refills: 0 | Status: DISCONTINUED | COMMUNITY
End: 2024-04-23

## 2024-04-23 RX ORDER — AMIODARONE HYDROCHLORIDE 200 MG/1
200 TABLET ORAL DAILY
Qty: 30 | Refills: 0 | Status: DISCONTINUED | COMMUNITY
End: 2024-04-23

## 2024-04-23 RX ORDER — POTASSIUM CHLORIDE 1500 MG/1
20 TABLET, EXTENDED RELEASE ORAL
Qty: 30 | Refills: 0 | Status: DISCONTINUED | COMMUNITY
End: 2024-04-23

## 2024-04-23 RX ORDER — INSULIN GLARGINE 100 [IU]/ML
100 INJECTION, SOLUTION SUBCUTANEOUS
Qty: 2 | Refills: 0 | Status: DISCONTINUED | COMMUNITY
End: 2024-04-23

## 2024-04-23 RX ORDER — LORATADINE 10 MG
17 TABLET,DISINTEGRATING ORAL DAILY
Qty: 3 | Refills: 0 | Status: DISCONTINUED | COMMUNITY
Start: 2024-04-02 | End: 2024-04-23

## 2024-04-24 NOTE — ASSESSMENT
[FreeTextEntry1] : - Follow up with PCP/Cardiologist Dr. Moon.  - Patient continues to be non-compliant with medications and medical advice. Emphasized importance of compliance.  - Continue current medication regimen. - Continue to increase activity and walk daily as tolerated. Continue to use incentive spirometer.  - No driving or strenuous activity for six weeks after surgery. Avoid lifting >10 to 15lbs for first two months after surgery.  - Continue to use compression stockings. Keep legs elevated above heart when resting/sitting/sleeping. - Call MD if you experience fever, fatigue, dizziness, confusion, syncope, shortness of breath, chest pain not relieved with analgesics, increased redness/drainage from incision. - Follow up with CTS PRN. Patient d/c from CTS service.

## 2024-04-24 NOTE — END OF VISIT
[FreeTextEntry3] : I, Dr. Omar Rhoades, personally performed the evaluation and management (E/M) services for this established patient who presents today with (a) new problem(s)/exacerbation of (an) existing condition(s).  That E/M includes conducting the clinically appropriate interval history &/or exam, assessing all new/exacerbated conditions, and establishing a new plan of care.  Today, my KARLEE,  was here to observe &/or participate in the visit & follow plan of care established by me.  The patient has recovered extremely well post her aortic root replacement. She is asymptomatic and clinically very well.  EKG demonstrates normal sinus rhythm with no block. She refuses to take her medications. I have explained the risks of this, and the importance of close cardiology follow-up.

## 2024-04-24 NOTE — REASON FOR VISIT
[de-identified] : Aortic root replacement (modified Bentall using a 21mm composite biological Handy Konect prosthesis) [de-identified] : 3/19/2024 [de-identified] : Post op she recovered in the ICU and was extubated, swan removed POD 1 and patient ambulated. She became hypotensive and bradycardic while on BB and Amio requiring pacing via PW. BB stopped amio continued. POD 2 hard tubes removed and primacor remained. POD 3 Primacor discontinued, patient was delined and TOV passed. POD 4 patient found to have Tracheobroncitis and was started on Ceftriaxone and Azithro and was bronched. Primacor was restarted and  was started. POD 5 weaning  and primacor. Patient was In and out of Afib with poor rate control to the 1teens-130s. Effusions noted on POCUS and b/l pigtails placed. POD 6 pigtails removed. POD 7 patient was tranferred to [facility] for SDU care, on bumex PO BID and not on BB due to blood pressure. Per Dr. Rhoades no huseyin agents at this time due to hx of CHB. POD8-9 patient continued to be in and out of rapid afib, amio load continued (tolerating well, no CHB episodes). POD10 PW removed, Eliquis started in the PM. POD11: EP consulted for patient and low dose BB started. Patient converted to sinus. PA/LAT with small effusions, patient is ambulating and satting well off of oxygen. POD12: patient remains in sinus this morning. Bedside echo was preformed this morning which revealed no effusion. Retention sutures to remain until follow up visit as per Dr. Rhoades. Patient discharged home on 3/31/24 with MCOT and Eliquis.  Patient presents for postop visit. She states that she has not taking any medications (besides her DM medications) since being home. She threw out the MCOT because it was bothering her. Patient is recuperating well from surgery. She is ambulating and increasing her activities daily. Patient denies fever, chills, dizziness, syncope, shortness of breath, chest pain, palpitations or peripheral edema.  Chest X ray reviewed today and demonstrates small pleural effusions. Minimal lower lung focal atelectasis. Postop change. No acute bone abnormality.

## (undated) DEVICE — DRSG BIOPATCH DISK W CHG 1" W 4.0MM HOLE

## (undated) DEVICE — APPLICATOR PACK

## (undated) DEVICE — SUT PROLENE 4-0 36" SH

## (undated) DEVICE — Device

## (undated) DEVICE — NDL COUNTER FOAM AND MAGNET 40-70

## (undated) DEVICE — CATH CV TRAY INSR ST UNIV

## (undated) DEVICE — SUT PROLENE 3-0 36" SH-1

## (undated) DEVICE — SUT VICRYL 1 36" CTX UNDYED

## (undated) DEVICE — FOLEY TRAY 16FR 5CC LF LUBRISIL ADVANCE TEMP CLOSED

## (undated) DEVICE — PACK PROC CV DRAPE

## (undated) DEVICE — DRSG MEPILEX 10 X 10CM (4 X 4") AG

## (undated) DEVICE — SUMP INTRACARDIAC/PERICARDIAL 20FR 1/4" ADULT

## (undated) DEVICE — PACING CABLE (BROWN) A/V TEMP SCREW DOWN 12FT

## (undated) DEVICE — VASCULAR DILATOR KIT 8,12,16,20, 24FR

## (undated) DEVICE — POSITIONER FOAM EGG CRATE ULNAR 2PCS (PINK)

## (undated) DEVICE — VENTING ADAPTER "Y" (RED/BLUE) 7.5"

## (undated) DEVICE — SUT PDS II 0 27" CT-1

## (undated) DEVICE — CATH TRIOX OXIMETRY 8F 3 LUMENS

## (undated) DEVICE — SUT STAINLESS STEEL 7 4-18" CCS

## (undated) DEVICE — DRAPE PROBE COVER 5" X 96"

## (undated) DEVICE — DRSG TEGADERM 4X4.75"

## (undated) DEVICE — PHRENIC NERVE PAD MEDIUM

## (undated) DEVICE — TONGUE DEPRESSOR

## (undated) DEVICE — CATH NG SALEM SUMP 16FR

## (undated) DEVICE — TUBING SMOKE EVAC 3/8" X 10FT FOR NEPTUNE

## (undated) DEVICE — SUT PROLENE 6-0 30" RB-2

## (undated) DEVICE — DRAIN RESERVOIR FOR JACKSON PRATT 100CC CARDINAL

## (undated) DEVICE — SPECIMEN CONTAINER 100ML

## (undated) DEVICE — CARDIOPLEGIA MANAGEMENT SET COLOR CODED CLAMPS

## (undated) DEVICE — DRSG TRACH DRAINAGE 4X4

## (undated) DEVICE — ELCTR ZOLL DEFIBRILLATOR PAD NO REPLACEMENT

## (undated) DEVICE — BLADE SCALPEL SAFETY #11 WITH PLASTIC GREEN HANDLE

## (undated) DEVICE — DRSG DERMABOND 0.7ML

## (undated) DEVICE — SUT PROLENE 5-0 30" RB-2

## (undated) DEVICE — APPLICATION TIP

## (undated) DEVICE — SUT PLEDGET 9MM X 4MM X 1.5MM

## (undated) DEVICE — SUT ETHIBOND EXCEL 2-0 30" SH-2 5 GREEN 5 WHITE

## (undated) DEVICE — GLV 7 PROTEXIS (WHITE)

## (undated) DEVICE — SUT VICRYL 2-0 27" CT-1

## (undated) DEVICE — CONNECTOR CARDIAC 1:1 FOR HUBLESS DRAINS

## (undated) DEVICE — TOURNIQUET SET 12FR (1 RED, 1 BLUE, 3 CLEAR, 1 SNARE) 7"

## (undated) DEVICE — SUT PROLENE 4-0 36" RB-1

## (undated) DEVICE — KIT APPLICATOR SPRAY FOR HARVEST SYSTEM

## (undated) DEVICE — ELCTR BOVIE TIP BLADE INSULATED 4" EDGE

## (undated) DEVICE — DRSG TAPE UMBILICAL COTTON 2" X 30 X 1/8"

## (undated) DEVICE — SUT DOUBLE 6 WIRE STERNAL

## (undated) DEVICE — SUCTION CATH AIRLIFE CONTROL VALVE TRIFLO 14FR

## (undated) DEVICE — TOURNIQUET SET 12FR (1 RED, 2 BLUE, 3 CLEAR, 1 SNARE) 5.5"

## (undated) DEVICE — SUT PROLENE 5-0 36" RB-1

## (undated) DEVICE — DRAPE TOWEL BLUE 17" X 24"

## (undated) DEVICE — SUT MONOCRYL 4-0 27" PS-2 UNDYED

## (undated) DEVICE — ELCTR STRYKER NEPTUNE SMOKE EVACUATION PENCIL (GREEN)

## (undated) DEVICE — DRAPE MICROSHIELD FOR LEICA W CLEARLENS 41X64"

## (undated) DEVICE — INS ST DBL SAFEJAW FGRTY

## (undated) DEVICE — SUT PLEDGET SOFT LARGE 3/8" X 3/16" X 1/16" X6

## (undated) DEVICE — CONNECTOR STRAIGHT 1/4 X 3/8"

## (undated) DEVICE — PACK OPEN HEART LNX

## (undated) DEVICE — SUT PLEDGET SOFT MEDIUM 1/4" X 1/8" X 1/16" X6

## (undated) DEVICE — SUT PDS II 2-0 27" CT-1

## (undated) DEVICE — BLADE SCALPEL SAFETY #15 WITH PLASTIC GREEN HANDLE

## (undated) DEVICE — PACING CABLE (BLUE) ATRIAL TEMP SCREW DOWN 12FT

## (undated) DEVICE — SUCTION YANKAUER BULBOUS TIP NO VENT

## (undated) DEVICE — DRAPE SLUSH / WARMER 44 X 66"

## (undated) DEVICE — NDL ANGIOGRAPHIC ADVANCED 18G X 7CM THIN (SMOOTH)

## (undated) DEVICE — SUT NUROLON 1 18" OS-8 (POP-OFF)

## (undated) DEVICE — SUT PROLENE 3-0 36" RB-1

## (undated) DEVICE — MARKING PEN W RULER

## (undated) DEVICE — SUT ETHIBOND 3-0 36" RB-1

## (undated) DEVICE — PREP SCRUB BRUSH W CHG 4%

## (undated) DEVICE — TUBING SUCTION NONCONDUCTIVE 6MM X 12FT

## (undated) DEVICE — SUT SILK 5-0 60" TIES

## (undated) DEVICE — DRSG MEPILEX 10 X 25CM (4 X 10") AG

## (undated) DEVICE — DRAPE IOBAN 33" X 23"

## (undated) DEVICE — PACK PROCEDURE HARVEST SMARTPREP APC-60

## (undated) DEVICE — SUT PROLENE 3-0 36" SH

## (undated) DEVICE — GLV 8.5 PROTEXIS (WHITE)

## (undated) DEVICE — SUT SILK 2-0 18" SH (POP-OFF)

## (undated) DEVICE — VESSEL LOOP MAXI-BLUE 0.120" X 16"

## (undated) DEVICE — CONNECTOR STRAIGHT 3/8 X 1/2"

## (undated) DEVICE — GOWN LG

## (undated) DEVICE — RIGID ADULT SUCKER

## (undated) DEVICE — SUT BOOT STANDARD (ORIGINAL YELLOW) 5 PAIR

## (undated) DEVICE — TUBING BRAT 2 SUCTION ASSEMBLY TWIST LOCK